# Patient Record
Sex: MALE | Race: WHITE | NOT HISPANIC OR LATINO | Employment: OTHER | URBAN - METROPOLITAN AREA
[De-identification: names, ages, dates, MRNs, and addresses within clinical notes are randomized per-mention and may not be internally consistent; named-entity substitution may affect disease eponyms.]

---

## 2017-02-13 ENCOUNTER — GENERIC CONVERSION - ENCOUNTER (OUTPATIENT)
Dept: OTHER | Facility: OTHER | Age: 67
End: 2017-02-13

## 2017-02-15 LAB — PROSTATE SPECIFIC ANTIGEN TOTAL (HISTORICAL): 1.4 NG/ML

## 2017-03-02 ENCOUNTER — ALLSCRIPTS OFFICE VISIT (OUTPATIENT)
Dept: OTHER | Facility: OTHER | Age: 67
End: 2017-03-02

## 2017-09-15 ENCOUNTER — ALLSCRIPTS OFFICE VISIT (OUTPATIENT)
Dept: OTHER | Facility: OTHER | Age: 67
End: 2017-09-15

## 2017-09-15 DIAGNOSIS — R73.09 OTHER ABNORMAL GLUCOSE: ICD-10-CM

## 2017-09-15 DIAGNOSIS — E78.5 HYPERLIPIDEMIA: ICD-10-CM

## 2017-09-15 DIAGNOSIS — Z12.5 ENCOUNTER FOR SCREENING FOR MALIGNANT NEOPLASM OF PROSTATE: ICD-10-CM

## 2017-09-15 DIAGNOSIS — Z00.00 ENCOUNTER FOR GENERAL ADULT MEDICAL EXAMINATION WITHOUT ABNORMAL FINDINGS: ICD-10-CM

## 2017-09-22 ENCOUNTER — LAB CONVERSION - ENCOUNTER (OUTPATIENT)
Dept: OTHER | Facility: OTHER | Age: 67
End: 2017-09-22

## 2017-09-22 LAB
A/G RATIO (HISTORICAL): 1.6 (CALC) (ref 1–2.5)
ALBUMIN SERPL BCP-MCNC: 4.3 G/DL (ref 3.6–5.1)
ALP SERPL-CCNC: 82 U/L (ref 40–115)
ALT SERPL W P-5'-P-CCNC: 14 U/L (ref 9–46)
AST SERPL W P-5'-P-CCNC: 19 U/L (ref 10–35)
BILIRUB SERPL-MCNC: 0.6 MG/DL (ref 0.2–1.2)
BUN SERPL-MCNC: 20 MG/DL (ref 7–25)
BUN/CREA RATIO (HISTORICAL): ABNORMAL (CALC) (ref 6–22)
CALCIUM SERPL-MCNC: 9.5 MG/DL (ref 8.6–10.3)
CHLORIDE SERPL-SCNC: 101 MMOL/L (ref 98–110)
CHOLEST SERPL-MCNC: 180 MG/DL
CHOLEST/HDLC SERPL: 2.5 (CALC)
CO2 SERPL-SCNC: 26 MMOL/L (ref 20–31)
CREAT SERPL-MCNC: 1.11 MG/DL (ref 0.7–1.25)
EGFR AFRICAN AMERICAN (HISTORICAL): 78 ML/MIN/1.73M2
EGFR-AMERICAN CALC (HISTORICAL): 67 ML/MIN/1.73M2
GAMMA GLOBULIN (HISTORICAL): 2.7 G/DL (CALC) (ref 1.9–3.7)
GLUCOSE (HISTORICAL): 101 MG/DL (ref 65–99)
HBA1C MFR BLD HPLC: 5.1 % OF TOTAL HGB
HDLC SERPL-MCNC: 73 MG/DL
HEPATITIS C ANTIBODY (HISTORICAL): NORMAL
LDL CHOLESTEROL (HISTORICAL): 86 MG/DL (CALC)
NON-HDL-CHOL (CHOL-HDL) (HISTORICAL): 107 MG/DL (CALC)
POTASSIUM SERPL-SCNC: 3.8 MMOL/L (ref 3.5–5.3)
SIGNAL TO CUT-OFF (HISTORICAL): 0.02
SODIUM SERPL-SCNC: 138 MMOL/L (ref 135–146)
TOTAL PROTEIN (HISTORICAL): 7 G/DL (ref 6.1–8.1)
TRIGL SERPL-MCNC: 111 MG/DL

## 2017-09-23 ENCOUNTER — GENERIC CONVERSION - ENCOUNTER (OUTPATIENT)
Dept: OTHER | Facility: OTHER | Age: 67
End: 2017-09-23

## 2017-10-12 ENCOUNTER — ALLSCRIPTS OFFICE VISIT (OUTPATIENT)
Dept: OTHER | Facility: OTHER | Age: 67
End: 2017-10-12

## 2017-10-26 ENCOUNTER — ALLSCRIPTS OFFICE VISIT (OUTPATIENT)
Dept: OTHER | Facility: OTHER | Age: 67
End: 2017-10-26

## 2017-11-09 ENCOUNTER — ALLSCRIPTS OFFICE VISIT (OUTPATIENT)
Dept: OTHER | Facility: OTHER | Age: 67
End: 2017-11-09

## 2017-11-24 ENCOUNTER — ALLSCRIPTS OFFICE VISIT (OUTPATIENT)
Dept: OTHER | Facility: OTHER | Age: 67
End: 2017-11-24

## 2017-12-08 ENCOUNTER — ALLSCRIPTS OFFICE VISIT (OUTPATIENT)
Dept: OTHER | Facility: OTHER | Age: 67
End: 2017-12-08

## 2018-01-09 NOTE — PROGRESS NOTES
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)   3  Bilateral carpal tunnel syndrome (354 0) (G56 03)   4  Cervical pain (723 1) (M54 2)   5  Cervical spinal stenosis (723 0) (M48 02)   6  Chronic low back pain (724 2,338 29) (M54 5,G89 29)   7  Colon cancer screening (V76 51) (Z12 11)   8  DDD (degenerative disc disease), lumbar (722 52) (M51 36)   9  DDD (degenerative disc disease), lumbosacral (722 52) (M51 37)   10  Degeneration of cervical disc without myelopathy (722 4) (M50 30)   11  Esophageal reflux (530 81) (K21 9)   12  Essential hypertension (401 9) (I10)   13  Hyperlipidemia (272 4) (E78 5)   14  Initial Medicare annual wellness visit (V70 0) (Z00 00)   15  Medicare annual wellness visit, subsequent (V70 0) (Z00 00)   16  Need for influenza vaccination (V04 81) (Z23)   17  Need for pneumococcal vaccination (V03 82) (Z23)   18  Need for vaccination with 13-polyvalent pneumococcal conjugate vaccine (V03 82) (Z23)   19  Prediabetes (790 29) (R73 03)   20  Prediabetes (790 29) (R73 09)   21  Seasonal allergies (477 9) (J30 2)   22  Special screening examination for neoplasm of prostate (V76 44) (Z12 5)    Current Meds   1  Centrum Silver 50+Men Oral Tablet; TAKE 1 TABLET DAILY; Therapy: (Recorded:56Zjt1444) to Recorded   2  Flonase Allergy Relief 50 MCG/ACT Nasal Suspension; INSTILL 2 SPRAYS AT BEDTIME   AS NEEDED; Therapy: 75Oqu9638 to (Evaluate:37Wlq9504)  Requested for: 76Rxc0402; Last   Rx:92Kbq3416 Ordered   3  Metoprolol Succinate ER 25 MG Oral Tablet Extended Release 24 Hour; Take 1 tablet   daily; Therapy: 62Ytu6812 to (Last Rx:68Vno4177)  Requested for: 88Ruq1911 Ordered   4  Metoprolol Succinate ER 50 MG Oral Tablet Extended Release 24 Hour;  Take 1 tablet   daily; Therapy: 04MXC7815 to (Evaluate:08Tom8410)  Requested for: 26Oct2017; Last   Rx:81Gic2352 Ordered   5  Omeprazole 20 MG Oral Capsule Delayed Release; TAKE 1 CAPSULE EVERY DAY; Therapy: 09PMN8802 to (Evaluate:10Mar2018)  Requested for: 57Ser8050; Last   Rx:03Xgh5117 Ordered   6  RaNITidine HCl - 300 MG Oral Tablet; Take 1 tablet daily as directed; Therapy: 98GFF7746 to (Wang Valdivia)  Requested for: 97Yrd2121; Last   Rx:35Vnj2716 Ordered   7  Simvastatin 20 MG Oral Tablet; TAKE 1 TABLET DAILY; Therapy: 75Sps6794 to (Evaluate:13Nov2017)  Requested for: 33ZXX9872; Last   Rx:49Xhw6151 Ordered   8  Vitamin C 500 MG Oral Capsule; TAKE 1 CAPSULE DAILY; Therapy: (Recorded:53Ipg7949) to Recorded    Allergies    1  No Known Drug Allergies    2  Seasonal    Future Appointments    Date/Time Provider Specialty Site   11/24/2017 11:00 AM MARIAN, Nurse Schedule  [facility]   09/17/2018 09:00 AM Gideon Mariscal DO Internal Medicine [facility]   03/09/2018 08:15 AM ZEENAT Dinero   Urology [facility]     Signatures   Electronically signed by : Ayah Huertas DO; Nov 12 2017 10:07PM EST                       (Author)

## 2018-01-11 NOTE — RESULT NOTES
Message   Notify the patient normal lipid level follow up as scheduled        Verified Results  (1) COMPREHENSIVE METABOLIC PANEL 28MHW0406 85:84WV Skip Sang     Test Name Result Flag Reference   GLUCOSE 97 mg/dL  65-99   Fasting reference interval   UREA NITROGEN (BUN) 22 mg/dL  7-25   CREATININE 1 03 mg/dL  0 70-1 25   For patients >52years of age, the reference limit  for Creatinine is approximately 13% higher for people  identified as -American  eGFR NON-AFR  AMERICAN 74 mL/min/1 73m2  > OR = 60   eGFR AFRICAN AMERICAN 86 mL/min/1 73m2  > OR = 60   BUN/CREATININE RATIO   0-34   NOT APPLICABLE (calc)   SODIUM 137 mmol/L  135-146   POTASSIUM 4 3 mmol/L  3 5-5 3   CHLORIDE 100 mmol/L     CARBON DIOXIDE 29 mmol/L  20-31   CALCIUM 9 6 mg/dL  8 6-10 3   PROTEIN, TOTAL 7 1 g/dL  6 1-8 1   ALBUMIN 4 6 g/dL  3 6-5 1   GLOBULIN 2 5 g/dL (calc)  1 9-3 7   ALBUMIN/GLOBULIN RATIO 1 8 (calc)  1 0-2 5   BILIRUBIN, TOTAL 0 7 mg/dL  0 2-1 2   ALKALINE PHOSPHATASE 76 U/L     AST 19 U/L  10-35   ALT 16 U/L  9-46     (1) LIPID PANEL, FASTING 18Ybu1540 09:16AM Skip Sang     Test Name Result Flag Reference   CHOLESTEROL, TOTAL 203 mg/dL H 125-200   HDL CHOLESTEROL 79 mg/dL  > OR = 40   TRIGLICERIDES 77 mg/dL  <603   LDL-CHOLESTEROL 109 mg/dL (calc)  <130   Desirable range <100 mg/dL for patients with CHD or  diabetes and <70 mg/dL for diabetic patients with  known heart disease  CHOL/HDLC RATIO 2 6 (calc)  < OR = 5 0   NON HDL CHOLESTEROL 124 mg/dL (calc)     Target for non-HDL cholesterol is 30 mg/dL higher than   LDL cholesterol target         Signatures   Electronically signed by : July Castillo DO; Sep 16 2016  4:55PM EST                       (Author)

## 2018-01-11 NOTE — RESULT NOTES
Message   notify the pt the Hepatitis C antibody is non-reactive f/u as scheduled        Verified Results  (1) COMPREHENSIVE METABOLIC PANEL 75HWI1359 29:27HZ Genius Pack     Test Name Result Flag Reference   GLUCOSE 101 mg/dL H 65-99   Fasting reference interval     For someone without known diabetes, a glucose value  between 100 and 125 mg/dL is consistent with  prediabetes and should be confirmed with a  follow-up test    UREA NITROGEN (BUN) 20 mg/dL  7-25   CREATININE 1 11 mg/dL  0 70-1 25   For patients >52years of age, the reference limit  for Creatinine is approximately 13% higher for people  identified as -American  eGFR NON-AFR  AMERICAN 67 mL/min/1 73m2  > OR = 60   eGFR AFRICAN AMERICAN 78 mL/min/1 73m2  > OR = 60   BUN/CREATININE RATIO   4-14   NOT APPLICABLE (calc)   SODIUM 138 mmol/L  135-146   POTASSIUM 3 8 mmol/L  3 5-5 3   CHLORIDE 101 mmol/L     CARBON DIOXIDE 26 mmol/L  20-31   CALCIUM 9 5 mg/dL  8 6-10 3   PROTEIN, TOTAL 7 0 g/dL  6 1-8 1   ALBUMIN 4 3 g/dL  3 6-5 1   GLOBULIN 2 7 g/dL (calc)  1 9-3 7   ALBUMIN/GLOBULIN RATIO 1 6 (calc)  1 0-2 5   BILIRUBIN, TOTAL 0 6 mg/dL  0 2-1 2   ALKALINE PHOSPHATASE 82 U/L     AST 19 U/L  10-35   ALT 14 U/L  9-46     (1) LIPID PANEL, FASTING 99Zqa5538 07:42AM [facility]     Test Name Result Flag Reference   CHOLESTEROL, TOTAL 180 mg/dL  <200   HDL CHOLESTEROL 73 mg/dL  >13   TRIGLICERIDES 599 mg/dL  <150   LDL-CHOLESTEROL 86 mg/dL (calc)     Reference range: <100     Desirable range <100 mg/dL for patients with CHD or  diabetes and <70 mg/dL for diabetic patients with  known heart disease  LDL-C is now calculated using the Hema-Young   calculation, which is a validated novel method providing   better accuracy than the Friedewald equation in the   estimation of LDL-C  Shiv Dinh  4250;173(45): 6035-8093   (http://Ynsect/faq/IPU304)   CHOL/HDLC RATIO 2 5 (calc)  <5 0   NON HDL CHOLESTEROL 107 mg/dL (calc)  <130   For patients with diabetes plus 1 major ASCVD risk   factor, treating to a non-HDL-C goal of <100 mg/dL   (LDL-C of <70 mg/dL) is considered a therapeutic   option       (Q) HEPATITIS C ANTIBODY 94Kex1478 07:42AM Dk Chicas     Test Name Result Flag Reference   HEPATITIS C ANTIBODY NON-REACTIVE  NON-REACTIVE   SIGNAL TO CUT-OFF 0 02  <1 00       Signatures   Electronically signed by : Yara Solorio DO; Sep 23 2017 10:32PM EST                       (Author)

## 2018-01-12 VITALS — HEART RATE: 64 BPM | DIASTOLIC BLOOD PRESSURE: 62 MMHG | SYSTOLIC BLOOD PRESSURE: 170 MMHG | WEIGHT: 170 LBS

## 2018-01-12 NOTE — RESULT NOTES
Message   Notify the patient normal comprehensive metabolic panel follow up as scheduled        Verified Results  (1) COMPREHENSIVE METABOLIC PANEL [id number] 82:93LL Madeline Paiz     Test Name Result Flag Reference   GLUCOSE 97 mg/dL  65-99   Fasting reference interval   UREA NITROGEN (BUN) 22 mg/dL  7-25   CREATININE 1 03 mg/dL  0 70-1 25   For patients >52years of age, the reference limit  for Creatinine is approximately 13% higher for people  identified as -American  eGFR NON-AFR   AMERICAN 74 mL/min/1 73m2  > OR = 60   eGFR AFRICAN AMERICAN 86 mL/min/1 73m2  > OR = 60   BUN/CREATININE RATIO   5-13   NOT APPLICABLE (calc)   SODIUM 137 mmol/L  135-146   POTASSIUM 4 3 mmol/L  3 5-5 3   CHLORIDE 100 mmol/L     CARBON DIOXIDE 29 mmol/L  20-31   CALCIUM 9 6 mg/dL  8 6-10 3   PROTEIN, TOTAL 7 1 g/dL  6 1-8 1   ALBUMIN 4 6 g/dL  3 6-5 1   GLOBULIN 2 5 g/dL (calc)  1 9-3 7   ALBUMIN/GLOBULIN RATIO 1 8 (calc)  1 0-2 5   BILIRUBIN, TOTAL 0 7 mg/dL  0 2-1 2   ALKALINE PHOSPHATASE 76 U/L     AST 19 U/L  10-35   ALT 16 U/L  9-46       Signatures   Electronically signed by : Trevor Pond DO; Sep 16 2016  4:54PM EST                       (Author)

## 2018-01-12 NOTE — RESULT NOTES
Message   Notify the patient normal hemoglobin A1c follow-up is scheduled        Verified Results  (1) COMPREHENSIVE METABOLIC PANEL 43GDM3535 29:61GR Josiah Beckford     Test Name Result Flag Reference   GLUCOSE 97 mg/dL  65-99   Fasting reference interval   UREA NITROGEN (BUN) 22 mg/dL  7-25   CREATININE 1 03 mg/dL  0 70-1 25   For patients >52years of age, the reference limit  for Creatinine is approximately 13% higher for people  identified as -American  eGFR NON-AFR  AMERICAN 74 mL/min/1 73m2  > OR = 60   eGFR AFRICAN AMERICAN 86 mL/min/1 73m2  > OR = 60   BUN/CREATININE RATIO   7-24   NOT APPLICABLE (calc)   SODIUM 137 mmol/L  135-146   POTASSIUM 4 3 mmol/L  3 5-5 3   CHLORIDE 100 mmol/L     CARBON DIOXIDE 29 mmol/L  20-31   CALCIUM 9 6 mg/dL  8 6-10 3   PROTEIN, TOTAL 7 1 g/dL  6 1-8 1   ALBUMIN 4 6 g/dL  3 6-5 1   GLOBULIN 2 5 g/dL (calc)  1 9-3 7   ALBUMIN/GLOBULIN RATIO 1 8 (calc)  1 0-2 5   BILIRUBIN, TOTAL 0 7 mg/dL  0 2-1 2   ALKALINE PHOSPHATASE 76 U/L     AST 19 U/L  10-35   ALT 16 U/L  9-46     (1) LIPID PANEL, FASTING 60Wzn3050 09:16AM Josiah Beckford     Test Name Result Flag Reference   CHOLESTEROL, TOTAL 203 mg/dL H 125-200   HDL CHOLESTEROL 79 mg/dL  > OR = 40   TRIGLICERIDES 77 mg/dL  <345   LDL-CHOLESTEROL 109 mg/dL (calc)  <130   Desirable range <100 mg/dL for patients with CHD or  diabetes and <70 mg/dL for diabetic patients with  known heart disease  CHOL/HDLC RATIO 2 6 (calc)  < OR = 5 0   NON HDL CHOLESTEROL 124 mg/dL (calc)     Target for non-HDL cholesterol is 30 mg/dL higher than   LDL cholesterol target  (Q) HEMOGLOBIN A1c 81Vhc1972 09:16AM Josiah Beckford   REPORT COMMENT:  FASTING:YES     Test Name Result Flag Reference   HEMOGLOBIN A1c 5 5 % of total Hgb  <5 7   According to ADA guidelines, hemoglobin A1c <7 0%  represents optimal control in non-pregnant diabetic  patients  Different metrics may apply to specific  patient populations  Standards of Medical Care in    Diabetes Care  2013;36:s11-s66     For the purpose of screening for the presence of  diabetes  <5 7%       Consistent with the absence of diabetes  5 7-6 4%    Consistent with increased risk for diabetes              (prediabetes)  >or=6 5%    Consistent with diabetes     This assay result is consistent with a decreased risk  of diabetes  Currently, no consensus exists for use of hemoglobin  A1c for diagnosis of diabetes for children         Signatures   Electronically signed by : Lowell Nicholson DO; Sep 16 2016  4:55PM EST                       (Author)

## 2018-01-13 NOTE — PROGRESS NOTES
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)   3  Bilateral carpal tunnel syndrome (354 0) (G56 03)   4  Cervical pain (723 1) (M54 2)   5  Cervical spinal stenosis (723 0) (M48 02)   6  Chronic low back pain (724 2,338 29) (M54 5,G89 29)   7  Colon cancer screening (V76 51) (Z12 11)   8  DDD (degenerative disc disease), lumbar (722 52) (M51 36)   9  DDD (degenerative disc disease), lumbosacral (722 52) (M51 37)   10  Degeneration of cervical disc without myelopathy (722 4) (M50 30)   11  Esophageal reflux (530 81) (K21 9)   12  Essential hypertension (401 9) (I10)   13  Hyperlipidemia (272 4) (E78 5)   14  Initial Medicare annual wellness visit (V70 0) (Z00 00)   15  Medicare annual wellness visit, subsequent (V70 0) (Z00 00)   16  Need for influenza vaccination (V04 81) (Z23)   17  Need for pneumococcal vaccination (V03 82) (Z23)   18  Need for vaccination with 13-polyvalent pneumococcal conjugate vaccine (V03 82) (Z23)   19  Prediabetes (790 29) (R73 03)   20  Prediabetes (790 29) (R73 09)   21  Seasonal allergies (477 9) (J30 2)   22  Special screening examination for neoplasm of prostate (V76 44) (Z12 5)    Current Meds   1  Centrum Silver 50+Men Oral Tablet; TAKE 1 TABLET DAILY; Therapy: (Recorded:56Iya0648) to Recorded   2  Flonase Allergy Relief 50 MCG/ACT Nasal Suspension; INSTILL 2 SPRAYS AT BEDTIME   AS NEEDED; Therapy: 11Xic2301 to (Evaluate:15Emo1652)  Requested for: 26Sgc5775; Last   Rx:26Pey7096 Ordered   3  Metoprolol Succinate ER 25 MG Oral Tablet Extended Release 24 Hour;  Take 1 tablet daily; Therapy: 89Inf3466 to (Last Rx:29Yzu0175)  Requested for: 81Dzl3983 Ordered   4  Omeprazole 20 MG Oral Capsule Delayed Release; TAKE 1 CAPSULE EVERY DAY; Therapy: 61ITB3586 to (Evaluate:10Mar2018)  Requested for: 19Leb5372; Last   Rx:17Tld8194 Ordered   5  RaNITidine HCl - 300 MG Oral Tablet; Take 1 tablet daily as directed; Therapy: 35UGO2222 to (Sligo Herminia)  Requested for: 30Dym9460; Last   Rx:40Gnl1313 Ordered   6  Simvastatin 20 MG Oral Tablet; TAKE 1 TABLET DAILY; Therapy: 22Ucy9532 to (Evaluate:13Nov2017)  Requested for: 23DLK7916; Last   Rx:80Zxy1578 Ordered   7  Vitamin C 500 MG Oral Capsule; TAKE 1 CAPSULE DAILY; Therapy: (Recorded:64Zzr3801) to Recorded    Allergies    1  No Known Drug Allergies    2  Seasonal    Vitals  Signs    Heart Rate: 84  Systolic: 146, LUE, Sitting  Diastolic: 88, LUE, Sitting    Future Appointments    Date/Time Provider Specialty Site   10/26/2017 01:00 PM MAB, [facility] Schedule  [facility]   09/17/2018 09:00 AM Hodges Nageotte, DO Internal Medicine [facility]   03/09/2018 08:15 AM ZEENAT Cuba  Urology [facility]     Signatures   Electronically signed by :  Abimael Abbott, ; Oct 18 2017  6:35PM EST                       (Author)

## 2018-01-14 VITALS — DIASTOLIC BLOOD PRESSURE: 88 MMHG | SYSTOLIC BLOOD PRESSURE: 198 MMHG | HEART RATE: 84 BPM

## 2018-01-15 NOTE — RESULT NOTES
Message   Notify the patient normal comprehensive metabolic panel except for elevation of the blood sugar in the prediabetic range; please have the patient reduce carbohydrates and sweets and follow up as scheduled        Verified Results  (1) COMPREHENSIVE METABOLIC PANEL [id number] 83:41DB Lazaro Mercado     Test Name Result Flag Reference   GLUCOSE 101 mg/dL H 65-99   Fasting reference interval     For someone without known diabetes, a glucose value  between 100 and 125 mg/dL is consistent with  prediabetes and should be confirmed with a  follow-up test    UREA NITROGEN (BUN) 20 mg/dL  7-25   CREATININE 1 11 mg/dL  0 70-1 25   For patients >52years of age, the reference limit  for Creatinine is approximately 13% higher for people  identified as -American  eGFR NON-AFR   AMERICAN 67 mL/min/1 73m2  > OR = 60   eGFR AFRICAN AMERICAN 78 mL/min/1 73m2  > OR = 60   BUN/CREATININE RATIO   8-01   NOT APPLICABLE (calc)   SODIUM 138 mmol/L  135-146   POTASSIUM 3 8 mmol/L  3 5-5 3   CHLORIDE 101 mmol/L     CARBON DIOXIDE 26 mmol/L  20-31   CALCIUM 9 5 mg/dL  8 6-10 3   PROTEIN, TOTAL 7 0 g/dL  6 1-8 1   ALBUMIN 4 3 g/dL  3 6-5 1   GLOBULIN 2 7 g/dL (calc)  1 9-3 7   ALBUMIN/GLOBULIN RATIO 1 6 (calc)  1 0-2 5   BILIRUBIN, TOTAL 0 6 mg/dL  0 2-1 2   ALKALINE PHOSPHATASE 82 U/L     AST 19 U/L  10-35   ALT 14 U/L  9-46       Signatures   Electronically signed by : Renard Luu DO; Sep 23 2017 10:30PM EST                       (Author)

## 2018-01-15 NOTE — RESULT NOTES
Message   Notify the patient normal lipid level follow up as scheduled        Verified Results  (1) COMPREHENSIVE METABOLIC PANEL [id number] 09:19OE Shopliment     Test Name Result Flag Reference   GLUCOSE 101 mg/dL H 65-99   Fasting reference interval     For someone without known diabetes, a glucose value  between 100 and 125 mg/dL is consistent with  prediabetes and should be confirmed with a  follow-up test    UREA NITROGEN (BUN) 20 mg/dL  7-25   CREATININE 1 11 mg/dL  0 70-1 25   For patients >52years of age, the reference limit  for Creatinine is approximately 13% higher for people  identified as -American  eGFR NON-AFR  AMERICAN 67 mL/min/1 73m2  > OR = 60   eGFR AFRICAN AMERICAN 78 mL/min/1 73m2  > OR = 60   BUN/CREATININE RATIO   0-61   NOT APPLICABLE (calc)   SODIUM 138 mmol/L  135-146   POTASSIUM 3 8 mmol/L  3 5-5 3   CHLORIDE 101 mmol/L     CARBON DIOXIDE 26 mmol/L  20-31   CALCIUM 9 5 mg/dL  8 6-10 3   PROTEIN, TOTAL 7 0 g/dL  6 1-8 1   ALBUMIN 4 3 g/dL  3 6-5 1   GLOBULIN 2 7 g/dL (calc)  1 9-3 7   ALBUMIN/GLOBULIN RATIO 1 6 (calc)  1 0-2 5   BILIRUBIN, TOTAL 0 6 mg/dL  0 2-1 2   ALKALINE PHOSPHATASE 82 U/L     AST 19 U/L  10-35   ALT 14 U/L  9-46     (1) LIPID PANEL, FASTING 78Pms3485 07:42AM [facility]     Test Name Result Flag Reference   CHOLESTEROL, TOTAL 180 mg/dL  <200   HDL CHOLESTEROL 73 mg/dL  >57   TRIGLICERIDES 254 mg/dL  <150   LDL-CHOLESTEROL 86 mg/dL (calc)     Reference range: <100     Desirable range <100 mg/dL for patients with CHD or  diabetes and <70 mg/dL for diabetic patients with  known heart disease  LDL-C is now calculated using the Hema-Young   calculation, which is a validated novel method providing   better accuracy than the Friedewald equation in the   estimation of LDL-C  Zach Guerrero al  Vicie Fails  8381;256(38): 5632-0406   (http://HashTip/faq/NZQ086)   CHOL/HDLC RATIO 2 5 (calc)  <5 0   NON HDL CHOLESTEROL 107 mg/dL (calc)  <130   For patients with diabetes plus 1 major ASCVD risk   factor, treating to a non-HDL-C goal of <100 mg/dL   (LDL-C of <70 mg/dL) is considered a therapeutic   option         Signatures   Electronically signed by : Quinn Warren DO; Sep 23 2017 10:31PM EST                       (Author)

## 2018-01-15 NOTE — PROGRESS NOTES
Assessment    1  Medicare annual wellness visit, subsequent (V70 0) (Z00 00)   2  Hyperlipidemia (272 4) (E78 5)   3  Prediabetes (790 29) (R73 09)    Assessment and plan #1 annual Medicare wellness examination completed for the patient overall the patient is clinically stable and doing well we encouraged the patient fall healthy and balanced diet, encourage the pt to exercize  routinely  I'll be ordering the patient's PSA and hemoglobin A1c  #2 hyperlipidemia recommend a low-cholesterol diet #3 prediabetes reduce carbohydrates and sweets and will monitor the hemoglobin A1c  The pt did receive the flu vaccine  Plan   Need for influenza vaccination    · Fluzone High-Dose 0 5 ML Intramuscular Suspension Prefilled Syringe  Prediabetes    · (1) HEMOGLOBIN A1C; Status:Active; Requested for:75Hfr0012;     (1) COMPREHENSIVE METABOLIC PANEL; Status:Resulted - Requires Verification;   Done: 78OPO4415 12:00AM  Due:11Ktj6284;Ordered; For:Prediabetes; Ordered By:Ronaldo Flores;   (1) LIPID PANEL, FASTING; Status:Resulted - Requires Verification;   Done: 28EJD3145 12:00AM  Due:58Mtb2247;Ordered;    For:Essential hypertension, Prediabetes; Ordered By:Gregor Flores Smoker;      History of Present Illness  Welcome to [employer] and Wellness Visits: The patient is being seen for the subsequent annual wellness visit  Medicare Screening and Risk Factors   Hospitalizations: no previous hospitalizations  Medicare Screening Tests Risk Questions   Drug and Alcohol Use: The patient has never smoked cigarettes  The patient reports occasional alcohol use and drinking 3-4 drinks per week  Alcohol concern:   The patient has no concerns about alcohol abuse  He has never used illicit drugs  Diet and Physical Activity: Current diet includes well balanced meals, low salt food choices, 1 cups of coffee per day, 0 cans of regular soda per day and 36  He exercises 5 times per week   Exercise: walking, strength training, daily 15min 1:15 (4 5miles ) minutes per day  Mood Disorder and Cognitive Impairment Screening: He denies feeling down, depressed, or hopeless over the past two weeks  He denies feeling little interest or pleasure in doing things over the past two weeks  Cognitive impairment screening: denies difficulty handling complex tasks, denies difficulty with reasoning, denies difficulty with language and denies difficulty with behavior  Functional Ability/Level of Safety: Hearing is normal bilaterally and a hearing aid is not used  Activities of daily living details: does not need help using the phone, no transportation help needed, does not need help shopping, no meal preparation help needed, does not need help doing housework, does not need help doing laundry, does not need help managing medications and does not need help managing money  Fall risk factors: The patient fell 0 times in the past 12 months  Home safety risk factors:  no loose rugs, no poor household lighting, no uneven floors, no household clutter, grab bars in the bathroom and handrails on the stairs  Advance Directives: Advance directives: living will, durable power of  for health care directives and advance directives  Co-Managers and Medical Equipment/Suppliers: See Patient Care Team      Patient Care Team    Care Team Member Role Specialty Office Number   Negro Cano DO Specialist Orthopedic Surgery [phone]  [place], 1970 [facility]  Internal Medicine [phone]   Maryanne PERES  Urology [phone]     Review of Systems    Cardiovascular: negative  Gastrointestinal: negative  Genitourinary: negative  Active Problems    1  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)   2  Bilateral carpal tunnel syndrome (354 0) (G56 01,G56 02)   3  Cervical pain (723 1) (M54 2)   4  Cervical spinal stenosis (723 0) (M48 02)   5  Chronic low back pain (724 2,338 29) (M54 5,G89 29)   6  DDD (degenerative disc disease), lumbar (722 52) (M51 36)   7  DDD (degenerative disc disease), lumbosacral (722 52) (M51 37)   8  Degeneration of cervical disc without myelopathy (722 4) (M50 30)   9  Esophageal reflux (530 81) (K21 9)   10  Essential hypertension (401 9) (I10)   11  Hyperlipidemia (272 4) (E78 5)   12  Initial Medicare annual wellness visit (V70 0) (Z00 00)   13  Need for pneumococcal vaccination (V03 82) (Z23)   14  Need for vaccination with 13-polyvalent pneumococcal conjugate vaccine (V03 82) (Z23)   15  Prediabetes (790 29) (R73 09)   16  Seasonal allergies (477 9) (J30 2)   17  Special screening examination for neoplasm of prostate (V76 44) (Z12 5)    Past Medical History    · Need for pneumococcal vaccination (V03 82) (Z23)    The active problems and past medical history were reviewed and updated today  Surgical History    · History of Back Surgery   · History of Hand Incision Tendon Sheath Of A Finger   · History of Sinus Surgery   · History of Tonsillectomy With Adenoidectomy    The surgical history was reviewed and updated today  Family History  Mother    · Family history of Heart Disease (V17 49)  Father    · Family history of Colon Cancer (V16 0)  Family History    · Family history of Family Health Status Of Father -    · Family history of Family Health Status Of Mother -     The family history was reviewed and updated today  Social History    · Being A Social Drinker   · Denied: History of Drug Use   · Former smoker (F42 64) (A53 284)  The social history was reviewed and updated today  The social history was reviewed and is unchanged  Current Meds   1  Bystolic 5 MG Oral Tablet; take 1 tablet every day; Therapy: 31PKS0271 to ([facility])  Requested for: 05Acf3276; Last   Rx:41Xah4150 Ordered   2  Centrum Silver TABS; TAKE 1 TABLET DAILY; Therapy: (Recorded:16Brr9998) to Recorded   3  Flonase Allergy Relief 50 MCG/ACT Nasal Suspension; INSTILL 2 SPRAYS AT BEDTIME   AS NEEDED;    Therapy: 83Zzl1036 to (Evaluate:10Hwm4328)  Requested for: 10Kgy6482; Last   Rx:68Hbn5290 Ordered   4  Omeprazole 20 MG Oral Capsule Delayed Release; TAKE 1 CAPSULE EVERY DAY; Therapy: 29SKE8179 to (Evaluate:76Nkb3421)  Requested for: 83Htn1837; Last   Rx:71Nly4878 Ordered   5  Ranitidine HCl - 300 MG Oral Tablet; Take 1 tablet daily as directed; Therapy: 28XQT2222 to (Evaluate:10Oct2016)  Requested for: 63Cpo1884; Last   Rx:11Kma8380 Ordered   6  Simvastatin 20 MG Oral Tablet; TAKE 1 TABLET DAILY; Therapy: 55Nqn7774 to (Danny Dela Cruz)  Requested for: 78ZKY5453; Last   Rx:99Dmq2209 Ordered   7  Vitamin C 500 MG Oral Capsule; TAKE 1 CAPSULE DAILY; Therapy: (Recorded:95Ljd2245) to Recorded    The medication list was reviewed and updated today  Allergies    1  No Known Drug Allergies    2  Seasonal    Immunizations   1    Influenza  09-Oct-2013    PCV  24-Aug-2015    PPSV  09-Dec-2013    Tdap  24-Feb-2011    Zoster  06-Jan-2014     Physical Exam    Constitutional   General appearance: No acute distress, well appearing and well nourished  Eyes   Conjunctiva and lids: No erythema, swelling or discharge  Pupils and irises: Equal, round, reactive to light  Ears, Nose, Mouth, and Throat   External inspection of ears and nose: Normal     Otoscopic examination: Tympanic membranes translucent with normal light reflex  Canals patent without erythema  Hearing: Normal     Nasal mucosa, septum, and turbinates: Normal without edema or erythema  Lips, teeth, and gums: Normal, good dentition  Oropharynx: Normal with no erythema, edema, exudate or lesions  Neck   Neck: Supple, symmetric, trachea midline, no masses  Pulmonary   Respiratory effort: No increased work of breathing or signs of respiratory distress  Auscultation of lungs: Clear to auscultation  Cardiovascular   Auscultation of heart: Normal rate and rhythm, normal S1 and S2, no murmurs  Abdominal aorta: Normal     Pedal pulses: 2+ bilaterally  Examination of extremities for edema and/or varicosities: Normal     Abdomen   Abdomen: Non-tender, no masses  Liver and spleen: No hepatomegaly or splenomegaly  Musculoskeletal   Inspection/palpation of digits and nails: Normal without clubbing or cyanosis  Muscle strength/tone: Normal     Skin   Skin and subcutaneous tissue: Normal without rashes or lesions  Palpation of skin and subcutaneous tissue: Normal turgor  Neurologic   Cranial nerves: Cranial nerves 2-12 intact  Psychiatric   Mood and affect: Normal        Future Appointments    Date/Time Provider Specialty Site   09/15/2017 10:00 AM Margarita Brenner DO Internal Medicine [facility]   02/15/2017 01:30 PM ZEENAT Santiago   Urology [facility]     Signatures   Electronically signed by : Ardia Canavan, DO; Sep 18 2016 11:34AM EST                       (Author)

## 2018-01-16 NOTE — PROGRESS NOTES
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)   3  Bilateral carpal tunnel syndrome (354 0) (G56 03)   4  Cervical pain (723 1) (M54 2)   5  Cervical spinal stenosis (723 0) (M48 02)   6  Chronic low back pain (724 2,338 29) (M54 5,G89 29)   7  Colon cancer screening (V76 51) (Z12 11)   8  DDD (degenerative disc disease), lumbar (722 52) (M51 36)   9  DDD (degenerative disc disease), lumbosacral (722 52) (M51 37)   10  Degeneration of cervical disc without myelopathy (722 4) (M50 30)   11  Esophageal reflux (530 81) (K21 9)   12  Essential hypertension (401 9) (I10)   13  Hyperlipidemia (272 4) (E78 5)   14  Initial Medicare annual wellness visit (V70 0) (Z00 00)   15  Medicare annual wellness visit, subsequent (V70 0) (Z00 00)   16  Need for influenza vaccination (V04 81) (Z23)   17  Need for pneumococcal vaccination (V03 82) (Z23)   18  Need for vaccination with 13-polyvalent pneumococcal conjugate vaccine (V03 82) (Z23)   19  Prediabetes (790 29) (R73 03)   20  Prediabetes (790 29) (R73 09)   21  Seasonal allergies (477 9) (J30 2)   22  Special screening examination for neoplasm of prostate (V76 44) (Z12 5)    Current Meds   1  Centrum Silver 50+Men Oral Tablet; TAKE 1 TABLET DAILY; Therapy: (Recorded:65Pyz1985) to Recorded   2  Flonase Allergy Relief 50 MCG/ACT Nasal Suspension; INSTILL 2 SPRAYS AT BEDTIME   AS NEEDED; Therapy: 92Sbp8052 to (Evaluate:29Srw5195)  Requested for: 44Wau0715; Last   Rx:56Dwu4823 Ordered   3  Metoprolol Succinate ER 25 MG Oral Tablet Extended Release 24 Hour; Take 1 tablet   daily;    Therapy: 10Tcl3560 to (Last Rx:16Uqe3366)  Requested for: 67Hwe7636 Ordered   4  Omeprazole 20 MG Oral Capsule Delayed Release; TAKE 1 CAPSULE EVERY DAY; Therapy: 34RNJ6131 to (Evaluate:10Mar2018)  Requested for: 13Lne7932; Last   Rx:92Kci8514 Ordered   5  RaNITidine HCl - 300 MG Oral Tablet; Take 1 tablet daily as directed; Therapy: 40AIE7366 to (Rose Villareal)  Requested for: 49Wcd9893; Last   Rx:58Wza5173 Ordered   6  Simvastatin 20 MG Oral Tablet; TAKE 1 TABLET DAILY; Therapy: 85Yiy8969 to (Evaluate:13Nov2017)  Requested for: 71KZU6050; Last   Rx:79Gqk6627 Ordered   7  Vitamin C 500 MG Oral Capsule; TAKE 1 CAPSULE DAILY; Therapy: (Recorded:42Jpu2045) to Recorded    Allergies    1  No Known Drug Allergies    2  Seasonal    Vitals  Signs    Heart Rate: 70  Systolic: 392, LUE, Sitting  Diastolic: 90, LUE, Sitting    Plan  Essential hypertension    · Metoprolol Succinate ER 50 MG Oral Tablet Extended Release 24 Hour; Take 1  tablet daily    Future Appointments    Date/Time Provider Specialty Site   11/09/2017 12:00 PM MAB, Nurse Schedule  [facility]   09/17/2018 09:00 AM Tayler Garcia DO Internal Medicine [facility]   03/09/2018 08:15 AM ZEENAT Caballero   Urology [facility]     Signatures   Electronically signed by : Danay Jim DO; Oct 26 2017  7:55PM EST                       (Author)

## 2018-01-16 NOTE — PROGRESS NOTES
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)   3  Bilateral carpal tunnel syndrome (354 0) (G56 03)   4  Cervical pain (723 1) (M54 2)   5  Cervical spinal stenosis (723 0) (M48 02)   6  Chronic low back pain (724 2,338 29) (M54 5,G89 29)   7  Colon cancer screening (V76 51) (Z12 11)   8  DDD (degenerative disc disease), lumbar (722 52) (M51 36)   9  DDD (degenerative disc disease), lumbosacral (722 52) (M51 37)   10  Degeneration of cervical disc without myelopathy (722 4) (M50 30)   11  Esophageal reflux (530 81) (K21 9)   12  Essential hypertension (401 9) (I10)   13  Hyperlipidemia (272 4) (E78 5)   14  Initial Medicare annual wellness visit (V70 0) (Z00 00)   15  Medicare annual wellness visit, subsequent (V70 0) (Z00 00)   16  Need for influenza vaccination (V04 81) (Z23)   17  Need for pneumococcal vaccination (V03 82) (Z23)   18  Need for vaccination with 13-polyvalent pneumococcal conjugate vaccine (V03 82) (Z23)   19  Prediabetes (790 29) (R73 03)   20  Prediabetes (790 29) (R73 09)   21  Seasonal allergies (477 9) (J30 2)   22  Special screening examination for neoplasm of prostate (V76 44) (Z12 5)    Current Meds   1  AmLODIPine Besylate 5 MG Oral Tablet; Take 1 tablet daily; Therapy: 46LOR7989 to (Evaluate:08Jan2018)  Requested for: 65ZCB7876; Last   Rx:09Nov2017 Ordered   2  Centrum Silver 50+Men Oral Tablet; TAKE 1 TABLET DAILY; Therapy: (Recorded:94Zdf0447) to Recorded   3  Flonase Allergy Relief 50 MCG/ACT Nasal Suspension; INSTILL 2 SPRAYS AT BEDTIME   AS NEEDED; Therapy: 20Prn0198 to (Evaluate:95Uro7894)  Requested for: 51Ktg8318; Last   Rx:44Pcn9936 Ordered   4  Metoprolol Succinate ER 25 MG Oral Tablet Extended Release 24 Hour; Take 1 tablet   daily;    Therapy: 43Xor3831 to  Requested for: 30HRP8763 Recorded 5  Metoprolol Succinate ER 50 MG Oral Tablet Extended Release 24 Hour; Take 1 tablet   daily; Therapy: 76JZP6401 to (Evaluate:35Rvp6609)  Requested for: 26Oct2017; Last   Rx:26Oct2017 Ordered   6  Omeprazole 20 MG Oral Capsule Delayed Release; TAKE 1 CAPSULE EVERY DAY; Therapy: 08NGC4826 to (Evaluate:10Mar2018)  Requested for: 40Xcj9187; Last   Rx:90Jrk1578 Ordered   7  RaNITidine HCl - 300 MG Oral Tablet; Take 1 tablet daily as directed; Therapy: 72HEY0325 to ([facility]er Line)  Requested for: 61Znw7103; Last   Rx:41Xie1133 Ordered   8  Simvastatin 20 MG Oral Tablet; TAKE 1 TABLET DAILY; Therapy: 01Yyr4353 to (Evaluate:13Nov2017)  Requested for: 04OHT7848; Last   Rx:01Ull0256 Ordered   9  Vitamin C 500 MG Oral Capsule; TAKE 1 CAPSULE DAILY; Therapy: (Recorded:24Vwp6929) to Recorded    Allergies    1  No Known Drug Allergies    2  Seasonal    Vitals  Signs    Systolic: 943  Diastolic: 72    Future Appointments    Date/Time Provider Specialty Site   12/08/2017 11:00 AM MARIAN, Nurse Schedule  [facility] [address] [place] [address]   09/17/2018 09:00 AM Danny Mcgregor DO Internal Medicine [facility] [address] [place] [address]   03/09/2018 08:15 AM ZEENAT Crook   Urology [facility] [address] [place] [address]     Signatures   Electronically signed by : Kodi Ospina DO; Nov 27 2017  9:36PM EST                       (Author)

## 2018-01-17 ENCOUNTER — ALLSCRIPTS OFFICE VISIT (OUTPATIENT)
Dept: OTHER | Facility: OTHER | Age: 68
End: 2018-01-17

## 2018-01-18 NOTE — RESULT NOTES
Message   notify the pt normal HgA1c f/u as scheduled        Verified Results  (1) COMPREHENSIVE METABOLIC PANEL 50VKC9889 97:82UF e994lyne Senior     Test Name Result Flag Reference   GLUCOSE 101 mg/dL H 65-99   Fasting reference interval     For someone without known diabetes, a glucose value  between 100 and 125 mg/dL is consistent with  prediabetes and should be confirmed with a  follow-up test    UREA NITROGEN (BUN) 20 mg/dL  7-25   CREATININE 1 11 mg/dL  0 70-1 25   For patients >52years of age, the reference limit  for Creatinine is approximately 13% higher for people  identified as -American  eGFR NON-AFR  AMERICAN 67 mL/min/1 73m2  > OR = 60   eGFR AFRICAN AMERICAN 78 mL/min/1 73m2  > OR = 60   BUN/CREATININE RATIO   7-74   NOT APPLICABLE (calc)   SODIUM 138 mmol/L  135-146   POTASSIUM 3 8 mmol/L  3 5-5 3   CHLORIDE 101 mmol/L     CARBON DIOXIDE 26 mmol/L  20-31   CALCIUM 9 5 mg/dL  8 6-10 3   PROTEIN, TOTAL 7 0 g/dL  6 1-8 1   ALBUMIN 4 3 g/dL  3 6-5 1   GLOBULIN 2 7 g/dL (calc)  1 9-3 7   ALBUMIN/GLOBULIN RATIO 1 6 (calc)  1 0-2 5   BILIRUBIN, TOTAL 0 6 mg/dL  0 2-1 2   ALKALINE PHOSPHATASE 82 U/L     AST 19 U/L  10-35   ALT 14 U/L  9-46     (1) LIPID PANEL, FASTING 62Nxy4896 07:42AM ADAPTIXne Senior     Test Name Result Flag Reference   CHOLESTEROL, TOTAL 180 mg/dL  <200   HDL CHOLESTEROL 73 mg/dL  >86   TRIGLICERIDES 276 mg/dL  <150   LDL-CHOLESTEROL 86 mg/dL (calc)     Reference range: <100     Desirable range <100 mg/dL for patients with CHD or  diabetes and <70 mg/dL for diabetic patients with  known heart disease  LDL-C is now calculated using the Hema-Young   calculation, which is a validated novel method providing   better accuracy than the Friedewald equation in the   estimation of LDL-C  Kwabena Posey Beat  1949;988(18): 7827-7009   (http://[employer]/faq/CKL991)   CHOL/HDLC RATIO 2 5 (calc)  <5 0   NON HDL CHOLESTEROL 107 mg/dL (calc)  <130   For patients with diabetes plus 1 major ASCVD risk   factor, treating to a non-HDL-C goal of <100 mg/dL   (LDL-C of <70 mg/dL) is considered a therapeutic   option  (Q) HEPATITIS C ANTIBODY 63Glk9615 07:42AM Melody Hernandes     Test Name Result Flag Reference   HEPATITIS C ANTIBODY NON-REACTIVE  NON-REACTIVE   SIGNAL TO CUT-OFF 0 02  <1 00     (Q) HEMOGLOBIN A1c 01Vcr9594 07:42AM Melody Hernandes   REPORT COMMENT:  FASTING:YES     Test Name Result Flag Reference   HEMOGLOBIN A1c 5 1 % of total Hgb  <5 7   For the purpose of screening for the presence of  diabetes:     <5 7%       Consistent with the absence of diabetes  5 7-6 4%    Consistent with increased risk for diabetes              (prediabetes)  > or =6 5%  Consistent with diabetes     This assay result is consistent with a decreased risk  of diabetes  Currently, no consensus exists regarding use of  hemoglobin A1c for diagnosis of diabetes in children  According to American Diabetes Association (ADA)  guidelines, hemoglobin A1c <7 0% represents optimal  control in non-pregnant diabetic patients  Different  metrics may apply to specific patient populations  Standards of Medical Care in Diabetes(ADA)         Signatures   Electronically signed by : Brandon Camarena DO; Sep 23 2017 10:33PM EST                       (Author)

## 2018-01-18 NOTE — PROGRESS NOTES
Assessment    1  Encounter for preventive health examination (V70 0) (Z00 00)    Assessment and plan #1 subsequent Medicare wellness examination completed for patient recommend a healthy balanced diet, currently the patient is clinically stable and doing well, recommend routine exercise will check a comprehensive metabolic panel, hemoglobin A1c and lipid panel  #2 hypertension the patient reports to me that because of insurance reasons he is interested in changing the bistolic to metoprolol ER 25 mg once a day ; He is to  monitor his blood pressure was a keeping log  for blood pressure and pulse check in 2-4 weeks if he notices a bp greater than 160/90-notify me immediately RTO in one year, bp check 2-4 weeks call if any problems     Plan   Health Maintenance    · Metoprolol Succinate ER 25 MG Oral Tablet Extended Release 24 Hour; Take 1  tablet daily   · (1) COMPREHENSIVE METABOLIC PANEL; Status:Active; Requested for:37Xuk7328; Health Maintenance, Hyperlipidemia    · (1) LIPID PANEL, FASTING; Status:Active; Requested for:73Irq9044; Health Maintenance, Prediabetes    · (1) HEMOGLOBIN A1C; Status:Active; Requested for:89Gbz4109;     (1) PSA (SCREEN) (Dx V76 44 Screen for Prostate Cancer); Status:Active; Requested for:22Law1650;   Perform:[facility]; Due:05Cnv6899; Ordered;    For:Health Maintenance, Special screening examination for neoplasm of prostate; Ordered By:Donna Flores;      Chief Complaint  Patient is here for a Medicare wellness exam       History of Present Illness  HPI: 150/70 at home today avg 140   Welcome to [employer] and Wellness Visits: The patient is being seen for the subsequent annual wellness visit  Medicare Screening and Risk Factors   Hospitalizations: no previous hospitalizations  Once per lifetime medicare screening tests: AAA screening [place] (2013)  Medicare Screening Tests Risk Questions   Osteoporosis risk assessment: none indicated     HIV risk assessment: none indicated  Drug and Alcohol Use: The patient is a former cigarette smoker  He quit [employer] y[employer]  The patient reports occasional alcohol use and drinking 5 drinks per week  He has never used illicit drugs  Diet and Physical Activity: Current diet includes well balanced meals, 2 servings of fruit per day, 2 servings of vegetables per day, 1 servings of simple carbohydrates per day, 1 servings of dairy products per day and 1 cups of coffee per day  He exercises daily and exercises 5 times per week  Exercise: walking, strength training 90 minutes per day  Mood Disorder and Cognitive Impairment Screening:   Depression screening was done using  He denies feeling down, depressed, or hopeless over the past two weeks  He denies feeling little interest or pleasure in doing things over the past two weeks  Cognitive impairment screening: denies difficulty learning/retaining new information, denies difficulty handling complex tasks, denies difficulty with reasoning, denies difficulty with spatial ability and orientation, denies difficulty with language and denies difficulty with behavior  Functional Ability/Level of Safety: Hearing is normal bilaterally  He does not use a hearing aid  Activities of daily living details: does not need help using the phone, no transportation help needed, does not need help shopping, no meal preparation help needed, does not need help doing housework, does not need help doing laundry, does not need help managing medications and does not need help managing money  Fall risk factors: The patient fell 0 times in the past 12 months  Home safety risk factors:  no unfamiliar surroundings, no loose rugs, no poor household lighting, no uneven floors, no household clutter, grab bars in the bathroom and handrails on the stairs  Advance Directives: Advance directives: living will, durable power of  for health care directives, advance directives and poa wife     Co-Managers and Medical Equipment/Suppliers: See Patient Care Team      Patient Care Team    Care Team Member Role Specialty Office Number   Di Billing DO Specialist Orthopedic Surgery [phone], 1970 [facility]  Internal Medicine [phone]   Kj PERES  Specialist Urology [phone]     Active Problems    1  Advance directive on file (V49 89) (Z78 9)   2  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)   3  Bilateral carpal tunnel syndrome (354 0) (G56 03)   4  Cervical pain (723 1) (M54 2)   5  Cervical spinal stenosis (723 0) (M48 02)   6  Chronic low back pain (724 2,338 29) (M54 5,G89 29)   7  Colon cancer screening (V76 51) (Z12 11)   8  DDD (degenerative disc disease), lumbar (722 52) (M51 36)   9  DDD (degenerative disc disease), lumbosacral (722 52) (M51 37)   10  Degeneration of cervical disc without myelopathy (722 4) (M50 30)   11  Esophageal reflux (530 81) (K21 9)   12  Essential hypertension (401 9) (I10)   13  Hyperlipidemia (272 4) (E78 5)   14  Initial Medicare annual wellness visit (V70 0) (Z00 00)   15  Medicare annual wellness visit, subsequent (V70 0) (Z00 00)   16  Need for influenza vaccination (V04 81) (Z23)   17  Need for pneumococcal vaccination (V03 82) (Z23)   18  Need for vaccination with 13-polyvalent pneumococcal conjugate vaccine (V03 82) (Z23)   19  Prediabetes (790 29) (R73 03)   20  Prediabetes (790 29) (R73 09)   21  Seasonal allergies (477 9) (J30 2)   22  Special screening examination for neoplasm of prostate (V76 44) (Z12 5)    Past Medical History    · Need for pneumococcal vaccination (V03 82) (Z23)    The active problems and past medical history were reviewed and updated today  Surgical History    · History of Back Surgery   · History of Hand Incision Tendon Sheath Of A Finger   · History of Sinus Surgery   · History of Tonsillectomy With Adenoidectomy    The surgical history was reviewed and updated today         Family History  Mother    · Family history of Heart Disease (V17 49)  Father    · Family history of Colon Cancer (V16 0)  Family History    · Family history of Family Health Status Of Father -    · Family history of Family Health Status Of Mother -     The family history was reviewed and updated today  Social History    · Advance directive on file (F78 42) (Z78 9)   · Being A Social Drinker   · Denied: History of Drug Use   · Former smoker (V15 82) (E19 901)  The social history was reviewed and updated today  The social history was reviewed and is unchanged  Current Meds   1  Centrum Silver 50+Men Oral Tablet; TAKE 1 TABLET DAILY; Therapy: (Recorded:04Wdd0764) to Recorded   2  Flonase Allergy Relief 50 MCG/ACT Nasal Suspension; INSTILL 2 SPRAYS AT BEDTIME   AS NEEDED; Therapy: 48Gqh9353 to (Evaluate:30Wkr0643)  Requested for: 87Dxg5387; Last   Rx:85Dtc4258 Ordered   3  Omeprazole 20 MG Oral Capsule Delayed Release; TAKE 1 CAPSULE EVERY DAY; Therapy: 12OZN9508 to (Evaluate:2018)  Requested for: 29Sgm0106; Last   Rx:54Dlc6199 Ordered   4  RaNITidine HCl - 300 MG Oral Tablet; Take 1 tablet daily as directed; Therapy: 11PHC8374 to (Glen Perches)  Requested for: 50Ele7008; Last   Rx:50Yni8579 Ordered   5  Simvastatin 20 MG Oral Tablet; TAKE 1 TABLET DAILY; Therapy: 04Rsk9129 to (Evaluate:2017)  Requested for: 34IGK7776; Last   Rx:24Bhj2836 Ordered   6  Vitamin C 500 MG Oral Capsule; TAKE 1 CAPSULE DAILY; Therapy: (Recorded:46Mgc2271) to Recorded    The medication list was reviewed and updated today  Allergies    1  No Known Drug Allergies    2   Seasonal    Immunizations   1 2    Influenza  09-Oct-2013 14-Sep-2016    PCV  24-Aug-2015     PPSV  09-Dec-2013     Tdap  2011     Zoster  2014      Vitals  Signs    Heart Rate: 56  Respiration: 16  Systolic: 251, RUE, Sitting  Diastolic: 92, RUE, Sitting  Height: 5 ft 7 in  Weight: 169 lb 0 2 oz  BMI Calculated: 26 47  BSA Calculated: 1 88  O2 Saturation: 99    Physical Exam    Constitutional   General appearance: No acute distress, well appearing and well nourished  Eyes   Conjunctiva and lids: No erythema, swelling or discharge  Pupils and irises: Equal, round, reactive to light  Ears, Nose, Mouth, and Throat   External inspection of ears and nose: Normal     Otoscopic examination: Tympanic membranes translucent with normal light reflex  Canals patent without erythema  Hearing: Normal     Nasal mucosa, septum, and turbinates: Normal without edema or erythema  Lips, teeth, and gums: Normal, good dentition  Oropharynx: Normal with no erythema, edema, exudate or lesions  Neck   Neck: Supple, symmetric, trachea midline, no masses  Pulmonary   Respiratory effort: No increased work of breathing or signs of respiratory distress  Auscultation of lungs: Clear to auscultation  Cardiovascular   Auscultation of heart: Normal rate and rhythm, normal S1 and S2, no murmurs  Abdominal aorta: Normal     Pedal pulses: 2+ bilaterally  Examination of extremities for edema and/or varicosities: Normal     Abdomen   Abdomen: Non-tender, no masses  Liver and spleen: No hepatomegaly or splenomegaly  Musculoskeletal   Inspection/palpation of digits and nails: Normal without clubbing or cyanosis  Skin   Palpation of skin and subcutaneous tissue: Normal turgor  Neurologic   Cranial nerves: Cranial nerves 2-12 intact  Psychiatric   Mood and affect: Normal        Results/Data  PHQ-2 Adult Depression Screening 63Qio7512 10:12AM User, [facility]     Test Name Result Flag Reference   PHQ-2 Adult Depression Score 0     Over the last two weeks, how often have you been bothered by any of the following problems?   Little interest or pleasure in doing things: Not at all - 0  Feeling down, depressed, or hopeless: Not at all - 0   PHQ-2 Adult Depression Screening Negative       Falls Risk Assessment (Dx Z13 89 Screen for Neurologic Disorder) 80NZG7384 10:11AM User, [facility]     Test Name Result Flag Reference   Falls Risk      No falls in the past year     (1) PSA (SCREEN) (Dx V76 44 Screen for Prostate Cancer) 33RSD7757 11:21AM Con Randhawa   REPORT COMMENT:  FASTING:NO     Test Name Result Flag Reference   PSA, TOTAL 1 4 ng/mL  < OR = 4 0   This test was performed using the Siemens  chemiluminescent method  Values obtained from  different assay methods cannot be used  interchangeably  PSA levels, regardless of  value, should not be interpreted as absolute  evidence of the presence or absence of disease  Future Appointments    Date/Time Provider Specialty Site   09/17/2018 09:00 AM Elpidio Frey DO Internal Medicine [facility]   03/09/2018 08:15 AM ZEENAT Campos   Urology [facility]     Signatures   Electronically signed by : Polo Alex DO; Sep 17 2017 10:59AM EST                       (Author)

## 2018-01-22 VITALS
HEART RATE: 56 BPM | DIASTOLIC BLOOD PRESSURE: 92 MMHG | WEIGHT: 169.01 LBS | BODY MASS INDEX: 26.53 KG/M2 | OXYGEN SATURATION: 99 % | RESPIRATION RATE: 16 BRPM | HEIGHT: 67 IN | SYSTOLIC BLOOD PRESSURE: 168 MMHG

## 2018-01-22 VITALS — SYSTOLIC BLOOD PRESSURE: 174 MMHG | DIASTOLIC BLOOD PRESSURE: 90 MMHG | HEART RATE: 70 BPM

## 2018-01-22 VITALS — SYSTOLIC BLOOD PRESSURE: 140 MMHG | DIASTOLIC BLOOD PRESSURE: 72 MMHG

## 2018-01-23 VITALS — DIASTOLIC BLOOD PRESSURE: 68 MMHG | HEART RATE: 69 BPM | SYSTOLIC BLOOD PRESSURE: 140 MMHG

## 2018-01-23 NOTE — PROGRESS NOTES
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)   3  Bilateral carpal tunnel syndrome (354 0) (G56 03)   4  Cervical pain (723 1) (M54 2)   5  Cervical spinal stenosis (723 0) (M48 02)   6  Chronic low back pain (724 2,338 29) (M54 5,G89 29)   7  Colon cancer screening (V76 51) (Z12 11)   8  DDD (degenerative disc disease), lumbar (722 52) (M51 36)   9  DDD (degenerative disc disease), lumbosacral (722 52) (M51 37)   10  Degeneration of cervical disc without myelopathy (722 4) (M50 30)   11  Esophageal reflux (530 81) (K21 9)   12  Essential hypertension (401 9) (I10)   13  Hyperlipidemia (272 4) (E78 5)   14  Initial Medicare annual wellness visit (V70 0) (Z00 00)   15  Medicare annual wellness visit, subsequent (V70 0) (Z00 00)   16  Need for influenza vaccination (V04 81) (Z23)   17  Need for pneumococcal vaccination (V03 82) (Z23)   18  Need for vaccination with 13-polyvalent pneumococcal conjugate vaccine (V03 82) (Z23)   19  Prediabetes (790 29) (R73 03)   20  Prediabetes (790 29) (R73 09)   21  Seasonal allergies (477 9) (J30 2)   22  Special screening examination for neoplasm of prostate (V76 44) (Z12 5)    Current Meds   1  AmLODIPine Besylate 5 MG Oral Tablet; Take 1 tablet daily; Therapy: 63DFZ6086 to (Gelene Ormond)  Requested for: 42KBP2486; Last   Rx:01Jan2018 Ordered   2  Centrum Silver 50+Men Oral Tablet; TAKE 1 TABLET DAILY; Therapy: (Recorded:26Rrj2163) to Recorded   3  Flonase Allergy Relief 50 MCG/ACT Nasal Suspension; INSTILL 2 SPRAYS AT BEDTIME   AS NEEDED; Therapy: 76Mqu8848 to (Evaluate:55Prs7864)  Requested for: 72Fkz1105; Last   Rx:55Qsu9326 Ordered   4   Metoprolol Succinate ER 50 MG Oral Tablet Extended Release 24 Hour; Take 1 tablet   daily; Therapy: 09NLK1766 to (HGNOBLOZ:90YZB8257)  Requested for: 66BFD3463; Last   Rx:09Jan2018 Ordered   5  Omeprazole 20 MG Oral Capsule Delayed Release; TAKE 1 CAPSULE EVERY DAY; Therapy: 93SDM2462 to (Evaluate:10Mar2018)  Requested for: 79Zsm4454; Last   Rx:23Fas4203 Ordered   6  RaNITidine HCl - 300 MG Oral Tablet; Take 1 tablet daily as directed; Therapy: 51GWZ3546 to (67 488 45 07)  Requested for: 51QCU9341; Last   Rx:30Nov2017 Ordered   7  Simvastatin 20 MG Oral Tablet; take 1 tablet every day; Therapy: 07Tvf2628 to (Evaluate:29Sep2018)  Requested for: 60FPO5993; Last   Rx:02Jan2018 Ordered   8  Vitamin C 500 MG Oral Capsule; TAKE 1 CAPSULE DAILY; Therapy: (Recorded:27Yve3727) to Recorded    Allergies    1  No Known Drug Allergies    2   Seasonal    Future Appointments    Date/Time Provider Specialty Site   09/17/2018 09:00 AM Ryan Solano DO Internal Medicine [facility]     Signatures   Electronically signed by : Angeline Cardona DO; Jan 17 2018  9:16PM EST                       (Author)

## 2018-01-23 NOTE — PROGRESS NOTES
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)   3  Bilateral carpal tunnel syndrome (354 0) (G56 03)   4  Cervical pain (723 1) (M54 2)   5  Cervical spinal stenosis (723 0) (M48 02)   6  Chronic low back pain (724 2,338 29) (M54 5,G89 29)   7  Colon cancer screening (V76 51) (Z12 11)   8  DDD (degenerative disc disease), lumbar (722 52) (M51 36)   9  DDD (degenerative disc disease), lumbosacral (722 52) (M51 37)   10  Degeneration of cervical disc without myelopathy (722 4) (M50 30)   11  Esophageal reflux (530 81) (K21 9)   12  Essential hypertension (401 9) (I10)   13  Hyperlipidemia (272 4) (E78 5)   14  Initial Medicare annual wellness visit (V70 0) (Z00 00)   15  Medicare annual wellness visit, subsequent (V70 0) (Z00 00)   16  Need for influenza vaccination (V04 81) (Z23)   17  Need for pneumococcal vaccination (V03 82) (Z23)   18  Need for vaccination with 13-polyvalent pneumococcal conjugate vaccine (V03 82) (Z23)   19  Prediabetes (790 29) (R73 03)   20  Prediabetes (790 29) (R73 09)   21  Seasonal allergies (477 9) (J30 2)   22  Special screening examination for neoplasm of prostate (V76 44) (Z12 5)    Current Meds   1  AmLODIPine Besylate 5 MG Oral Tablet; Take 1 tablet daily; Therapy: 71OCO2145 to (Evaluate:08Jan2018)  Requested for: 21ZGP1770; Last   Rx:09Nov2017 Ordered   2  Centrum Silver 50+Men Oral Tablet; TAKE 1 TABLET DAILY; Therapy: (Recorded:17Bbx1104) to Recorded   3  Flonase Allergy Relief 50 MCG/ACT Nasal Suspension; INSTILL 2 SPRAYS AT BEDTIME   AS NEEDED; Therapy: 73Ifl1121 to (Evaluate:87Acr9082)  Requested for: 80Efh1458; Last   Rx:64Vvb1173 Ordered   4  Metoprolol Succinate ER 25 MG Oral Tablet Extended Release 24 Hour; Take 1 tablet   daily; Therapy: 97Apf6346 to  Requested for: 25RAH4910 Recorded   5   Metoprolol Succinate ER 50 MG Oral Tablet Extended Release 24 Hour; Take 1 tablet   daily; Therapy: 05FMI9844 to (Evaluate:18Tgz6647)  Requested for: 26Oct2017; Last   Rx:26Oct2017 Ordered   6  Omeprazole 20 MG Oral Capsule Delayed Release; TAKE 1 CAPSULE EVERY DAY; Therapy: 96ZEF7260 to (Evaluate:10Mar2018)  Requested for: 87Gzn9337; Last   Rx:46Xdw7484 Ordered   7  RaNITidine HCl - 300 MG Oral Tablet; Take 1 tablet daily as directed; Therapy: 94GGS8311 to (Fidelia Trip)  Requested for: 64XPO4831; Last   Rx:05Sia8705 Ordered   8  Simvastatin 20 MG Oral Tablet; TAKE 1 TABLET DAILY; Therapy: 66Foq0985 to (Evaluate:13Nov2017)  Requested for: 21EZA9094; Last   Rx:13Kpu3278 Ordered   9  Vitamin C 500 MG Oral Capsule; TAKE 1 CAPSULE DAILY; Therapy: (Recorded:44Fbj2078) to Recorded    Allergies    1  No Known Drug Allergies    2  Seasonal    Vitals  Signs    Heart Rate: 69  Systolic: 671  Diastolic: 68    Future Appointments    Date/Time Provider Specialty Site   09/17/2018 09:00 AM Megha Keith DO Internal Medicine [facility]   03/09/2018 08:15 AM ZEENAT Barreto   Urology [facility]     Signatures   Electronically signed by : Liam Neal DO; Dec  9 2017  7:17AM EST                       (Author)

## 2018-02-02 DIAGNOSIS — Z12.5 ENCOUNTER FOR SCREENING FOR MALIGNANT NEOPLASM OF PROSTATE: ICD-10-CM

## 2018-02-19 ENCOUNTER — PATIENT MESSAGE (OUTPATIENT)
Dept: UROLOGY | Facility: AMBULATORY SURGERY CENTER | Age: 68
End: 2018-02-19

## 2018-02-19 NOTE — TELEPHONE ENCOUNTER
From: Susan Serna  To: Baldemar Rogers MD  Sent: 2/19/2018 4:07 PM EST  Subject: Test Results Question    I have a question about PSA resulted on 2/19/18  Result not shown  Why not?

## 2018-02-26 ENCOUNTER — PATIENT MESSAGE (OUTPATIENT)
Dept: UROLOGY | Facility: AMBULATORY SURGERY CENTER | Age: 68
End: 2018-02-26

## 2018-02-27 NOTE — TELEPHONE ENCOUNTER
From: Jyoti Matters  To: Jose Ramon Zarate MD  Sent: 2/26/2018 7:16 PM EST  Subject: Test Results Question    I have a question about PSA resulted on 2/19/18     3rd notification that PSA result is posted  It is not

## 2018-03-08 DIAGNOSIS — K21.9 GASTROESOPHAGEAL REFLUX DISEASE WITHOUT ESOPHAGITIS: Primary | ICD-10-CM

## 2018-03-08 RX ORDER — OMEPRAZOLE 20 MG/1
CAPSULE, DELAYED RELEASE ORAL
Qty: 90 CAPSULE | Refills: 1 | Status: ON HOLD | OUTPATIENT
Start: 2018-03-08 | End: 2019-01-15

## 2018-03-08 RX ORDER — RANITIDINE 300 MG/1
TABLET ORAL
Qty: 90 TABLET | Refills: 0 | Status: SHIPPED | OUTPATIENT
Start: 2018-03-08 | End: 2018-08-31 | Stop reason: SDUPTHER

## 2018-03-19 ENCOUNTER — OFFICE VISIT (OUTPATIENT)
Dept: UROLOGY | Facility: AMBULATORY SURGERY CENTER | Age: 68
End: 2018-03-19
Payer: COMMERCIAL

## 2018-03-19 VITALS
SYSTOLIC BLOOD PRESSURE: 124 MMHG | DIASTOLIC BLOOD PRESSURE: 80 MMHG | BODY MASS INDEX: 25.61 KG/M2 | HEIGHT: 68 IN | WEIGHT: 169 LBS

## 2018-03-19 DIAGNOSIS — Z12.5 SCREENING FOR PROSTATE CANCER: Primary | ICD-10-CM

## 2018-03-19 PROCEDURE — 99214 OFFICE O/P EST MOD 30 MIN: CPT | Performed by: UROLOGY

## 2018-03-19 RX ORDER — AMLODIPINE BESYLATE 5 MG/1
1 TABLET ORAL DAILY
COMMUNITY
Start: 2017-11-09 | End: 2018-12-18 | Stop reason: SDUPTHER

## 2018-03-19 RX ORDER — SIMVASTATIN 20 MG
1 TABLET ORAL DAILY
COMMUNITY
Start: 2014-09-12 | End: 2018-10-08 | Stop reason: SDUPTHER

## 2018-03-19 RX ORDER — METOPROLOL SUCCINATE 50 MG/1
1 TABLET, EXTENDED RELEASE ORAL DAILY
COMMUNITY
Start: 2017-10-26 | End: 2019-01-18 | Stop reason: SDUPTHER

## 2018-03-19 RX ORDER — FLUTICASONE PROPIONATE 50 MCG
SPRAY, SUSPENSION (ML) NASAL
Status: ON HOLD | COMMUNITY
Start: 2015-08-24 | End: 2019-01-15

## 2018-03-19 RX ORDER — RANITIDINE 300 MG/1
1 TABLET ORAL DAILY
Status: ON HOLD | COMMUNITY
Start: 2013-12-09 | End: 2019-01-15

## 2018-03-19 RX ORDER — MULTIVIT-MIN/IRON/FOLIC ACID/K 18-600-40
1 CAPSULE ORAL DAILY
COMMUNITY

## 2018-03-19 NOTE — LETTER
March 19, 2018     Prerna Macario  [address] [address] [address]    Patient: Karina Person   YOB: 1950   Date of Visit: 3/19/2018       Dear Dr Aga Church: Thank you for referring Merlinda Balboa to me for evaluation  Below are my notes for this consultation  If you have questions, please do not hesitate to call me  I look forward to following your patient along with you  Sincerely,        Eddie Hinkle MD        CC: No Recipients  Eddie Hinkle MD  3/19/2018  1:31 PM  Sign at close encounter  3/19/2018    Karina Person  1950  [id number]        Assessment  Prostate cancer screening    Plan  We discussed asymmetry and firmness of the right side of the prostate  We discussed recommended AUA screening guidelines for prostate cancer screening  Typically I would discontinue screening, however in his case I would like to continue  Patient agrees with this plan  Will check PSA and prostate examination next year  Total visit time was 25 minutes of which over 50% was spent on counseling  History of Present Illness  Erich Bruner is a 77 y o  male presents for prostate cancer screening  He has no urinary symptoms  No family history of prostate cancer  Previously found to have firmness of the right side of his prostate  PSA has been 1 4 and 1 2 previously  Currently PSA 1 5  AUA SYMPTOM SCORE    Flowsheet Row Most Recent Value   AUA SYMPTOM SCORE   How often have you had a sensation of not emptying your bladder completely after you finished urinating? 0   How often have you had to urinate again less than two hours after you finished urinating? 0   How often have you found you stopped and started again several times when you urinate?  0   How often have you found it difficult to postpone urination? 0   How often have you had a weak urinary stream?  0   How often have you had to push or strain to begin urination?   0   How many times did you most typically get up to urinate from the time you went to bed at night until the time you got up in the morning? 1   Quality of Life: If you were to spend the rest of your life with your urinary condition just the way it is now, how would you feel about that?  1   AUA SYMPTOM SCORE  1          Review of Systems  Review of Systems      Past Medical History  Past Medical History:   Diagnosis Date    Hypertension        Past Social History  Past Surgical History:   Procedure Laterality Date    BACK SURGERY      HAND SURGERY      SINUS SURGERY      TONSILLECTOMY AND ADENOIDECTOMY         Past Family History  Family History   Problem Relation Age of Onset    Heart disease Mother     Colon cancer Father        Past Social history  Social History     Social History    Marital status: /Civil Union     Spouse name: N/A    Number of children: N/A    Years of education: N/A     Occupational History    Not on file       Social History Main Topics    Smoking status: Former Smoker    Smokeless tobacco: Never Used    Alcohol use Yes      Comment: social     Drug use: No    Sexual activity: Not on file     Other Topics Concern    Not on file     Social History Narrative    No narrative on file       Current Medications  Current Outpatient Prescriptions   Medication Sig Dispense Refill    amLODIPine (NORVASC) 5 mg tablet Take 1 tablet by mouth daily      Ascorbic Acid (VITAMIN C) 500 MG CAPS Take 1 capsule by mouth daily      fluticasone (FLONASE ALLERGY RELIEF) 50 mcg/act nasal spray into each nostril      metoprolol succinate (TOPROL-XL) 50 mg 24 hr tablet Take 1 tablet by mouth daily      Multiple Vitamins-Minerals (MULTIVITAMIN ADULTS 50+ PO) Take 1 tablet by mouth daily      omeprazole (PriLOSEC) 20 mg delayed release capsule TAKE 1 CAPSULE EVERY DAY 90 capsule 1    ranitidine (ZANTAC) 300 MG tablet TAKE 1 TABLET DAILY AS DIRECTED 90 tablet 0    ranitidine (ZANTAC) 300 MG tablet Take 1 tablet by mouth daily      simvastatin (ZOCOR) 20 mg tablet Take 1 tablet by mouth daily       No current facility-administered medications for this visit  Allergies  No Known Allergies    Past Medical History, Social History, Family History, medications and allergies were reviewed  Vitals  Vitals:    03/19/18 1254   BP: 124/80   BP Location: Left arm   Patient Position: Sitting   Weight: 76 7 kg (169 lb)   Height: 5' 8" (1 727 m)       Physical Exam  Physical Exam   Constitutional: He is oriented to person, place, and time  He appears well-developed and well-nourished  Cardiovascular: Normal rate  Pulmonary/Chest: Effort normal    Abdominal: Soft  Genitourinary:   Genitourinary Comments: About 35 g, firm on the right side   Musculoskeletal: Normal range of motion  Neurological: He is alert and oriented to person, place, and time  Skin: Skin is warm, dry and intact  Psychiatric: He has a normal mood and affect  Vitals reviewed          Results  No results found for: PSA  Lab Results   Component Value Date    CALCIUM 9 5 09/21/2017     09/21/2017    K 3 8 09/21/2017    CO2 26 09/21/2017     09/21/2017    BUN 20 09/21/2017    CREATININE 1 11 09/21/2017     No results found for: WBC, HGB, HCT, MCV, PLT

## 2018-03-19 NOTE — PROGRESS NOTES
3/19/2018    Sergio Byrnes  1950  [id number]        Assessment  Prostate cancer screening    Plan  We discussed asymmetry and firmness of the right side of the prostate  We discussed recommended AUA screening guidelines for prostate cancer screening  Typically I would discontinue screening, however in his case I would like to continue  Patient agrees with this plan  Will check PSA and prostate examination next year  Total visit time was 25 minutes of which over 50% was spent on counseling  History of Present Illness  Preeti Woodard is a 77 y o  male presents for prostate cancer screening  He has no urinary symptoms  No family history of prostate cancer  Previously found to have firmness of the right side of his prostate  PSA has been 1 4 and 1 2 previously  Currently PSA 1 5  AUA SYMPTOM SCORE    Flowsheet Row Most Recent Value   AUA SYMPTOM SCORE   How often have you had a sensation of not emptying your bladder completely after you finished urinating? 0   How often have you had to urinate again less than two hours after you finished urinating? 0   How often have you found you stopped and started again several times when you urinate?  0   How often have you found it difficult to postpone urination? 0   How often have you had a weak urinary stream?  0   How often have you had to push or strain to begin urination? 0   How many times did you most typically get up to urinate from the time you went to bed at night until the time you got up in the morning?   1   Quality of Life: If you were to spend the rest of your life with your urinary condition just the way it is now, how would you feel about that?  1   AUA SYMPTOM SCORE  1          Review of Systems  Review of Systems      Past Medical History  Past Medical History:   Diagnosis Date    Hypertension        Past Social History  Past Surgical History:   Procedure Laterality Date    BACK SURGERY      HAND SURGERY      SINUS SURGERY      TONSILLECTOMY AND ADENOIDECTOMY         Past Family History  Family History   Problem Relation Age of Onset    Heart disease Mother     Colon cancer Father        Past Social history  Social History     Social History    Marital status: /Civil Union     Spouse name: N/A    Number of children: N/A    Years of education: N/A     Occupational History    Not on file  Social History Main Topics    Smoking status: Former Smoker    Smokeless tobacco: Never Used    Alcohol use Yes      Comment: social     Drug use: No    Sexual activity: Not on file     Other Topics Concern    Not on file     Social History Narrative    No narrative on file       Current Medications  Current Outpatient Prescriptions   Medication Sig Dispense Refill    amLODIPine (NORVASC) 5 mg tablet Take 1 tablet by mouth daily      Ascorbic Acid (VITAMIN C) 500 MG CAPS Take 1 capsule by mouth daily      fluticasone (FLONASE ALLERGY RELIEF) 50 mcg/act nasal spray into each nostril      metoprolol succinate (TOPROL-XL) 50 mg 24 hr tablet Take 1 tablet by mouth daily      Multiple Vitamins-Minerals (MULTIVITAMIN ADULTS 50+ PO) Take 1 tablet by mouth daily      omeprazole (PriLOSEC) 20 mg delayed release capsule TAKE 1 CAPSULE EVERY DAY 90 capsule 1    ranitidine (ZANTAC) 300 MG tablet TAKE 1 TABLET DAILY AS DIRECTED 90 tablet 0    ranitidine (ZANTAC) 300 MG tablet Take 1 tablet by mouth daily      simvastatin (ZOCOR) 20 mg tablet Take 1 tablet by mouth daily       No current facility-administered medications for this visit  Allergies  No Known Allergies    Past Medical History, Social History, Family History, medications and allergies were reviewed  Vitals  Vitals:    03/19/18 1254   BP: 124/80   BP Location: Left arm   Patient Position: Sitting   Weight: 76 7 kg (169 lb)   Height: 5' 8" (1 727 m)       Physical Exam  Physical Exam   Constitutional: He is oriented to person, place, and time   He appears well-developed and well-nourished  Cardiovascular: Normal rate  Pulmonary/Chest: Effort normal    Abdominal: Soft  Genitourinary:   Genitourinary Comments: About 35 g, firm on the right side   Musculoskeletal: Normal range of motion  Neurological: He is alert and oriented to person, place, and time  Skin: Skin is warm, dry and intact  Psychiatric: He has a normal mood and affect  Vitals reviewed          Results  No results found for: PSA  Lab Results   Component Value Date    CALCIUM 9 5 09/21/2017     09/21/2017    K 3 8 09/21/2017    CO2 26 09/21/2017     09/21/2017    BUN 20 09/21/2017    CREATININE 1 11 09/21/2017     No results found for: WBC, HGB, HCT, MCV, PLT

## 2018-05-14 DIAGNOSIS — K21.9 GASTROESOPHAGEAL REFLUX DISEASE WITHOUT ESOPHAGITIS: Primary | ICD-10-CM

## 2018-05-14 RX ORDER — RANITIDINE 300 MG/1
TABLET ORAL
Qty: 90 TABLET | Refills: 0 | Status: SHIPPED | OUTPATIENT
Start: 2018-05-14 | End: 2018-09-17 | Stop reason: ALTCHOICE

## 2018-08-31 DIAGNOSIS — K21.9 GASTROESOPHAGEAL REFLUX DISEASE WITHOUT ESOPHAGITIS: ICD-10-CM

## 2018-09-02 RX ORDER — RANITIDINE 300 MG/1
300 TABLET ORAL DAILY
Qty: 90 TABLET | Refills: 1 | Status: SHIPPED | OUTPATIENT
Start: 2018-09-02 | End: 2018-09-17 | Stop reason: ALTCHOICE

## 2018-09-17 ENCOUNTER — OFFICE VISIT (OUTPATIENT)
Dept: INTERNAL MEDICINE CLINIC | Facility: CLINIC | Age: 68
End: 2018-09-17
Payer: COMMERCIAL

## 2018-09-17 ENCOUNTER — HOSPITAL ENCOUNTER (OUTPATIENT)
Dept: RADIOLOGY | Facility: HOSPITAL | Age: 68
Discharge: HOME | End: 2018-09-17
Payer: COMMERCIAL

## 2018-09-17 VITALS
SYSTOLIC BLOOD PRESSURE: 142 MMHG | RESPIRATION RATE: 16 BRPM | DIASTOLIC BLOOD PRESSURE: 78 MMHG | HEIGHT: 68 IN | OXYGEN SATURATION: 99 % | BODY MASS INDEX: 26.16 KG/M2 | WEIGHT: 172.6 LBS | HEART RATE: 66 BPM

## 2018-09-17 DIAGNOSIS — M24.811 CREPITUS OF RIGHT SHOULDER JOINT: ICD-10-CM

## 2018-09-17 DIAGNOSIS — Z23 NEED FOR INFLUENZA VACCINATION: ICD-10-CM

## 2018-09-17 DIAGNOSIS — Z12.11 SCREENING FOR COLON CANCER: ICD-10-CM

## 2018-09-17 DIAGNOSIS — M79.645 PAIN OF LEFT THUMB: ICD-10-CM

## 2018-09-17 DIAGNOSIS — Z00.00 MEDICARE ANNUAL WELLNESS VISIT, SUBSEQUENT: ICD-10-CM

## 2018-09-17 DIAGNOSIS — R73.9 ELEVATED BLOOD SUGAR: ICD-10-CM

## 2018-09-17 DIAGNOSIS — K21.9 GASTROESOPHAGEAL REFLUX DISEASE WITHOUT ESOPHAGITIS: Primary | ICD-10-CM

## 2018-09-17 DIAGNOSIS — Z13.6 SCREENING FOR CARDIOVASCULAR CONDITION: ICD-10-CM

## 2018-09-17 PROCEDURE — 99214 OFFICE O/P EST MOD 30 MIN: CPT | Performed by: INTERNAL MEDICINE

## 2018-09-17 PROCEDURE — 3725F SCREEN DEPRESSION PERFORMED: CPT | Performed by: INTERNAL MEDICINE

## 2018-09-17 PROCEDURE — 73030 X-RAY EXAM OF SHOULDER: CPT

## 2018-09-17 PROCEDURE — 1036F TOBACCO NON-USER: CPT | Performed by: INTERNAL MEDICINE

## 2018-09-17 PROCEDURE — 1125F AMNT PAIN NOTED PAIN PRSNT: CPT | Performed by: INTERNAL MEDICINE

## 2018-09-17 PROCEDURE — 1101F PT FALLS ASSESS-DOCD LE1/YR: CPT | Performed by: INTERNAL MEDICINE

## 2018-09-17 PROCEDURE — 1170F FXNL STATUS ASSESSED: CPT | Performed by: INTERNAL MEDICINE

## 2018-09-17 PROCEDURE — G0439 PPPS, SUBSEQ VISIT: HCPCS | Performed by: INTERNAL MEDICINE

## 2018-09-17 PROCEDURE — G0008 ADMIN INFLUENZA VIRUS VAC: HCPCS

## 2018-09-17 PROCEDURE — 4040F PNEUMOC VAC/ADMIN/RCVD: CPT

## 2018-09-17 PROCEDURE — 90662 IIV NO PRSV INCREASED AG IM: CPT

## 2018-09-17 PROCEDURE — 73130 X-RAY EXAM OF HAND: CPT

## 2018-09-17 RX ORDER — FAMOTIDINE 20 MG/1
20 TABLET, FILM COATED ORAL DAILY
Qty: 90 TABLET | Refills: 1 | Status: SHIPPED | OUTPATIENT
Start: 2018-09-17 | End: 2018-12-03 | Stop reason: SDUPTHER

## 2018-09-17 NOTE — PROGRESS NOTES
Assessment and Plan:    Problem List Items Addressed This Visit        Other    Medicare annual wellness visit, subsequent     Assessment and plan 1  Subsequent Medicare annual wellness examination overall the patient is clinically stable and doing well, we encouraged the patient to follow a healthy and balanced diet  We recommend that the patient exercise routinely approximately 30 minutes 5 times per week   We have reviewed the patient's vaccines and have made recommendations for updates if necessary flu vaccine, shingles vaccine       We will be ordering screening laboratories which are age appropriate  Return to the office in    12 months    call if any problems  Other Visit Diagnoses     Need for influenza vaccination    -  Primary    Relevant Orders    influenza vaccine, [id number], high-dose, PF 0 5 mL, for patients 65 yr+ (FLUZONE HIGH-DOSE) (Completed)    Gastroesophageal reflux disease without esophagitis        Relevant Medications    famotidine (PEPCID) 20 mg tablet    Screening for cardiovascular condition        Relevant Orders    Lipid Panel with Direct LDL reflex    Elevated blood sugar        Relevant Orders    Comprehensive metabolic panel    Hemoglobin A1C    Crepitus of right shoulder joint        Relevant Orders    XR shoulder 2+ vw right    Pain of left thumb        Relevant Orders    XR hand 3+ vw left    Screening for colon cancer        Relevant Orders    Ambulatory referral to Gastroenterology        Health Maintenance Due   Topic Date Due    INFLUENZA VACCINE  09/01/2018     Return to office 12  months  call if any problems    HPI:Patient does monitor the blood pressures at home and on average is running 120-130 range 63A diastolic  Lawrence Huang is a 77 y o  male here for his Subsequent Wellness Visit      Patient Active Problem List   Diagnosis    Medicare annual wellness visit, subsequent     Past Medical History:   Diagnosis Date    Hypertension      Past Surgical History:   Procedure Laterality Date    BACK SURGERY      HAND SURGERY      Incision Tendon Sheath Of a Finger    SINUS SURGERY      TONSILLECTOMY AND ADENOIDECTOMY       Family History   Problem Relation Age of Onset    Heart disease Mother     Colon cancer Father      History   Smoking Status    Former Smoker   Smokeless Tobacco    Never Used     History   Alcohol Use    Yes     Comment: social       History   Drug Use No       Current Outpatient Prescriptions   Medication Sig Dispense Refill    amLODIPine (NORVASC) 5 mg tablet Take 1 tablet by mouth daily      Ascorbic Acid (VITAMIN C) 500 MG CAPS Take 1 capsule by mouth daily      fluticasone (FLONASE ALLERGY RELIEF) 50 mcg/act nasal spray into each nostril      metoprolol succinate (TOPROL-XL) 50 mg 24 hr tablet Take 1 tablet by mouth daily      Multiple Vitamins-Minerals (MULTIVITAMIN ADULTS 50+ PO) Take 1 tablet by mouth daily      omeprazole (PriLOSEC) 20 mg delayed release capsule TAKE 1 CAPSULE EVERY DAY 90 capsule 1    ranitidine (ZANTAC) 300 MG tablet Take 1 tablet by mouth daily      simvastatin (ZOCOR) 20 mg tablet Take 1 tablet by mouth daily      famotidine (PEPCID) 20 mg tablet Take 1 tablet (20 mg total) by mouth daily 90 tablet 1     No current facility-administered medications for this visit  No Known Allergies  Immunization History   Administered Date(s) Administered    Influenza Split High Dose Preservative Free IM 09/14/2016    Influenza TIV (IM) 10/09/2013    Influenza, high dose seasonal 0 5 mL 09/17/2018    Pneumococcal Conjugate 13-Valent 08/24/2015    Pneumococcal Polysaccharide PPV23 12/09/2013    Tdap 02/24/2011    Zoster 01/06/2014       Patient Care Team:  Mike Cid DO as PCP - DO Munir Rice MD Jeppie Diener, DO    Medicare Screening Tests and Risk Assessments:      Health Risk Assessment:  Patient rates overall health as good   Patient feels that their physical health rating is Same  Eyesight was rated as Same  Hearing was rated as Same  Patient feels that their emotional and mental health rating is Same  Pain experienced by patient in the last 7 days has been None  Patient states that he has experienced no weight loss or gain in last 6 months  Emotional/Mental Health:  Patient has been feeling nervous/anxious  PHQ-9 Depression Screening:    Frequency of the following problems over the past two weeks:      1  Little interest or pleasure in doing things: 0 - not at all      2  Feeling down, depressed, or hopeless: 0 - not at all  PHQ-2 Score: 0          Broken Bones/Falls: Fall Risk Assessment:    In the past year, patient has experienced: No history of falling in past year          Bladder/Bowel:  Patient has not leaked urine accidently in the last six months  Patient reports no loss of bowel control  Immunizations:  Patient has had a flu vaccination within the last year  Patient has received a pneumonia shot  Patient has received a shingles shot  Patient has received tetanus/diphtheria shot  Home Safety:  Patient does not have trouble with stairs inside or outside of their home  Patient currently reports that there are no safety hazards present in home, working smoke alarms, working carbon monoxide detectors  Preventative Screenings:   prostate cancer screen performed, colon cancer screen completed, cholesterol screen completed, glaucoma eye exam completed,     Nutrition:  Current diet: Regular with servings of the following:    Medications:  Patient is currently taking over-the-counter supplements  List of OTC medications includes: vitamin c,  Patient is able to manage medications  Lifestyle Choices:  Patient reports no tobacco use  Patient has smoked or used tobacco in the past   Patient has not stopped tobacco use  Patient reports alcohol use  Alcohol use per week: 3/week  Patient drives a vehicle    Patient wears seat belt  Activities of Daily Living:  Can get out of bed by his or her self, able to dress self, able to make own meals, able to do own shopping, able to bathe self, can do own laundry/housekeeping, can manage own money, pay bills and track expenses    Previous Hospitalizations:  No hospitalization or ED visit in past 12 months        Advanced Directives:  Patient has decided on a power of   Patient has spoken to designated power of   Patient has completed advanced directive

## 2018-09-18 NOTE — ASSESSMENT & PLAN NOTE
Clinically stable doing well I will have the patient discontinue Zantac which is not as effective cancer on Pepcid 20 mg once a day I would like him to discontinue Prilosec because of potential side effects he has a contact me if the symptoms not improved

## 2018-09-18 NOTE — PROGRESS NOTES
Assessment/Plan:    Medicare annual wellness visit, subsequent  Assessment and plan 1  Subsequent Medicare annual wellness examination overall the patient is clinically stable and doing well, we encouraged the patient to follow a healthy and balanced diet  We recommend that the patient exercise routinely approximately 30 minutes 5 times per week   We have reviewed the patient's vaccines and have made recommendations for updates if necessary flu vaccine, shingles vaccine       We will be ordering screening laboratories which are age appropriate  Return to the office in    12 months    call if any problems  Screening for colon cancer  Counseled, will order a screening colonoscopy    Gastroesophageal reflux disease without esophagitis  Clinically stable doing well I will have the patient discontinue Zantac which is not as effective cancer on Pepcid 20 mg once a day I would like him to discontinue Prilosec because of potential side effects he has a contact me if the symptoms not improved    Pain of left thumb  Suspect osteoarthritis will check x-ray of the hand    Elevated blood sugar  Pre diabetes -I have counseled the patient to follow a healthy balanced diet, I have counseled patient reduce carbohydrates and sweets in the diet, I would like the patient exercise routinely  I will be checking hemoglobin A1c and comprehensive metabolic panel  Have counseled patient about the prevention of diabetes, and the risk of progression to type 2 diabetes      Crepitus of right shoulder joint  Rule out osteoarthritis will check x-ray of the right shoulder         Problem List Items Addressed This Visit        Digestive    Gastroesophageal reflux disease without esophagitis - Primary     Clinically stable doing well I will have the patient discontinue Zantac which is not as effective cancer on Pepcid 20 mg once a day I would like him to discontinue Prilosec because of potential side effects he has a contact me if the symptoms not improved         Relevant Medications    famotidine (PEPCID) 20 mg tablet       Other    Medicare annual wellness visit, subsequent     Assessment and plan 1  Subsequent Medicare annual wellness examination overall the patient is clinically stable and doing well, we encouraged the patient to follow a healthy and balanced diet  We recommend that the patient exercise routinely approximately 30 minutes 5 times per week   We have reviewed the patient's vaccines and have made recommendations for updates if necessary flu vaccine, shingles vaccine       We will be ordering screening laboratories which are age appropriate  Return to the office in    12 months    call if any problems  Elevated blood sugar     Pre diabetes -I have counseled the patient to follow a healthy balanced diet, I have counseled patient reduce carbohydrates and sweets in the diet, I would like the patient exercise routinely  I will be checking hemoglobin A1c and comprehensive metabolic panel  Have counseled patient about the prevention of diabetes, and the risk of progression to type 2 diabetes  Relevant Orders    Comprehensive metabolic panel    Hemoglobin A1C    Screening for colon cancer     Counseled, will order a screening colonoscopy         Relevant Orders    Ambulatory referral to Gastroenterology    Crepitus of right shoulder joint     Rule out osteoarthritis will check x-ray of the right shoulder         Relevant Orders    XR shoulder 2+ vw right    Pain of left thumb     Suspect osteoarthritis will check x-ray of the hand         Relevant Orders    XR hand 3+ vw left      Other Visit Diagnoses     Need for influenza vaccination        Relevant Orders    influenza vaccine, [id number], high-dose, PF 0 5 mL, for patients 65 yr+ (FLUZONE HIGH-DOSE) (Completed)          Return to office 12  months  call if any problems  Subjective:      Patient ID: Lizeth Dominique is a 77 y o  male      HPI 74-year old male coming in for a follow up visit regarding GERD, elevated blood sugar, crepitus of the right shoulder, pain of the left thumb; The patient reports me compliant taking medications without untoward side effects the  The patient is here to review his medical condition, update me on the medical condition and the patient reports me no hospitalizations and no ER visits  He reports me he is doing a good job at his diet and continues to be active he has noticed pain in the MCP joint of the 1st thumb no injury  He also reports me his GERD is fairly well controlled with Zantac he does take on occasion omeprazole  The patient reports me right shoulder clicking and popping but no pain and no limitation of range of motion  The following portions of the patient's history were reviewed and updated as appropriate: allergies, current medications, past family history, past medical history, past social history, past surgical history and problem list     Review of Systems   Constitutional: Negative for activity change, appetite change and unexpected weight change  HENT: Negative for congestion and postnasal drip  Eyes: Negative for visual disturbance  Respiratory: Negative for cough and shortness of breath  Cardiovascular: Negative for chest pain  Gastrointestinal: Negative for abdominal pain, diarrhea, nausea and vomiting  Musculoskeletal:        Left thumb pain, right shoulder pain   Neurological: Negative for dizziness, light-headedness and headaches  Psychiatric/Behavioral: The patient is not nervous/anxious            Objective:      /78 (BP Location: Right arm, Patient Position: Sitting, Cuff Size: Standard)   Pulse 66   Resp 16   Ht 5' 8" (1 727 m)   Wt 78 3 kg (172 lb 9 6 oz)   SpO2 99%   BMI 26 24 kg/m²     Right shoulder crepitus with range of motion but patient has a full range of motion without pain negative impingement sign, examination of the left 1st MCP joint there is crepitus and mild tenderness full range of motion no synovitis     Physical Exam   Constitutional: He appears well-developed and well-nourished  No distress  HENT:   Head: Normocephalic and atraumatic  Right Ear: External ear normal    Left Ear: External ear normal    Mouth/Throat: Oropharynx is clear and moist    Eyes: Conjunctivae are normal  Pupils are equal, round, and reactive to light  Right eye exhibits no discharge  Left eye exhibits no discharge  No scleral icterus  Neck: Neck supple  Cardiovascular: Normal rate, regular rhythm and normal heart sounds  Exam reveals no gallop and no friction rub  No murmur heard  Pulmonary/Chest: No respiratory distress  He has no wheezes  He has no rales  Abdominal: Soft  Bowel sounds are normal  He exhibits no distension and no mass  There is no tenderness  There is no rebound and no guarding  Musculoskeletal: He exhibits no edema or deformity  Lymphadenopathy:     He has no cervical adenopathy  Neurological: He is alert  Skin: He is not diaphoretic  Psychiatric: He has a normal mood and affect

## 2018-09-18 NOTE — ASSESSMENT & PLAN NOTE
Assessment and plan 1  Subsequent Medicare annual wellness examination overall the patient is clinically stable and doing well, we encouraged the patient to follow a healthy and balanced diet  We recommend that the patient exercise routinely approximately 30 minutes 5 times per week   We have reviewed the patient's vaccines and have made recommendations for updates if necessary flu vaccine, shingles vaccine       We will be ordering screening laboratories which are age appropriate  Return to the office in    12 months    call if any problems

## 2018-09-21 LAB
ALBUMIN SERPL-MCNC: 4.5 G/DL (ref 3.6–5.1)
ALBUMIN/GLOB SERPL: 1.8 (CALC) (ref 1–2.5)
ALP SERPL-CCNC: 81 U/L (ref 40–115)
ALT SERPL-CCNC: 21 U/L (ref 9–46)
AST SERPL-CCNC: 26 U/L (ref 10–35)
BILIRUB SERPL-MCNC: 0.5 MG/DL (ref 0.2–1.2)
BUN SERPL-MCNC: 21 MG/DL (ref 7–25)
BUN/CREAT SERPL: ABNORMAL (CALC) (ref 6–22)
CALCIUM SERPL-MCNC: 9.9 MG/DL (ref 8.6–10.3)
CHLORIDE SERPL-SCNC: 101 MMOL/L (ref 98–110)
CHOLEST SERPL-MCNC: 188 MG/DL
CHOLEST/HDLC SERPL: 2.3 (CALC)
CO2 SERPL-SCNC: 29 MMOL/L (ref 20–32)
CREAT SERPL-MCNC: 1.05 MG/DL (ref 0.7–1.18)
GLOBULIN SER CALC-MCNC: 2.5 G/DL (CALC) (ref 1.9–3.7)
GLUCOSE SERPL-MCNC: 102 MG/DL (ref 65–99)
HBA1C MFR BLD: 5.1 % OF TOTAL HGB
HDLC SERPL-MCNC: 81 MG/DL
LDLC SERPL CALC-MCNC: 91 MG/DL (CALC)
NONHDLC SERPL-MCNC: 107 MG/DL (CALC)
POTASSIUM SERPL-SCNC: 4.2 MMOL/L (ref 3.5–5.3)
PROT SERPL-MCNC: 7 G/DL (ref 6.1–8.1)
SL AMB EGFR AFRICAN AMERICAN: 83 ML/MIN/1.73M2
SL AMB EGFR NON AFRICAN AMERICAN: 72 ML/MIN/1.73M2
SODIUM SERPL-SCNC: 139 MMOL/L (ref 135–146)
TRIGL SERPL-MCNC: 73 MG/DL

## 2018-10-08 DIAGNOSIS — R03.0 ELEVATED BLOOD PRESSURE READING: Primary | ICD-10-CM

## 2018-10-08 DIAGNOSIS — E78.5 HYPERLIPIDEMIA, UNSPECIFIED HYPERLIPIDEMIA TYPE: ICD-10-CM

## 2018-10-08 RX ORDER — SIMVASTATIN 20 MG
TABLET ORAL
Qty: 90 TABLET | Refills: 2 | Status: SHIPPED | OUTPATIENT
Start: 2018-10-08 | End: 2019-06-19 | Stop reason: SDUPTHER

## 2018-12-03 DIAGNOSIS — K21.9 GASTROESOPHAGEAL REFLUX DISEASE WITHOUT ESOPHAGITIS: ICD-10-CM

## 2018-12-03 RX ORDER — FAMOTIDINE 20 MG/1
20 TABLET, FILM COATED ORAL DAILY
Qty: 90 TABLET | Refills: 1 | Status: SHIPPED | OUTPATIENT
Start: 2018-12-03 | End: 2019-04-29 | Stop reason: SDUPTHER

## 2018-12-18 DIAGNOSIS — I10 ESSENTIAL HYPERTENSION: Primary | ICD-10-CM

## 2018-12-18 RX ORDER — AMLODIPINE BESYLATE 5 MG/1
TABLET ORAL
Qty: 90 TABLET | Refills: 3 | Status: SHIPPED | OUTPATIENT
Start: 2018-12-18 | End: 2019-10-16 | Stop reason: SDUPTHER

## 2018-12-26 ENCOUNTER — OFFICE VISIT (OUTPATIENT)
Dept: GASTROENTEROLOGY | Facility: AMBULARY SURGERY CENTER | Age: 68
End: 2018-12-26
Payer: COMMERCIAL

## 2018-12-26 VITALS
DIASTOLIC BLOOD PRESSURE: 88 MMHG | TEMPERATURE: 97.3 F | HEART RATE: 74 BPM | HEIGHT: 68 IN | SYSTOLIC BLOOD PRESSURE: 150 MMHG | WEIGHT: 173.6 LBS | BODY MASS INDEX: 26.31 KG/M2

## 2018-12-26 DIAGNOSIS — Z12.11 SCREENING FOR COLON CANCER: ICD-10-CM

## 2018-12-26 DIAGNOSIS — Z83.71 FAMILY HISTORY OF COLONIC POLYPS: Primary | ICD-10-CM

## 2018-12-26 PROBLEM — Z83.719 FAMILY HISTORY OF COLONIC POLYPS: Status: ACTIVE | Noted: 2018-12-26

## 2018-12-26 PROCEDURE — 99204 OFFICE O/P NEW MOD 45 MIN: CPT | Performed by: INTERNAL MEDICINE

## 2018-12-26 NOTE — LETTER
December 26, 2018     Prerna Patel  [address] [facility] [address]    Patient: Viki Watkins   YOB: 1950   Date of Visit: 12/26/2018       Dear Dr Kelechi Reed: Thank you for referring Renata Wilkinson to me for evaluation  Below are my notes for this consultation  If you have questions, please do not hesitate to call me  I look forward to following your patient along with you  Sincerely,        Isaiah Escamilla MD        CC: No Recipients  Isaiah Escamilla MD  12/26/2018  9:41 AM  Sign at close encounter  Consultation - [facility]  Viki Watkins 77 y o  male MRN: [id number]          Assessment & Plan:    Very pleasant and healthy 64-year-old gentleman with a family history of colon polyps in his father  Last colonoscopy was 5 years ago was normal     1   Family history of colon polyps:  -we will schedule patient for colonoscopy  -discussed with the patient, if this examination is normal, would likely schedule 10 year recall  -discussed with him the risks of the procedure including bleeding, surgery, perforation, missed polyp detection rate      _____________________________________________________________        CC:  Family history of colon polyps, here for evaluation for colonoscopy    HPI:  Viki Watkins is a 77 y o male who was referred for evaluation of family history of colon polyps  The patient reports that his father had a vague history of colon polyps, it is unclear as he was estranged  Patient has been receiving colonoscopies every 5 years, his last examination was normal at that time  He denies any changes symptoms  Denies any nausea, vomiting, abdominal pain, diarrhea, constipation, melena, rectal bleeding  He reports occasional intermittent reflux symptoms, he has been on PPI therapy and ranitidine for the last 8 or 9 years, recently switched to Pepcid alone in the evening  This seems to control his symptoms very well    He denies any red flag symptoms or dysphagia  Reports that he had an upper endoscopy in the past which was unremarkable  Patient denies any tobacco prep  He rarely drinks  He is retired   His wife works for orthopedic surgeon in [employer] to [place]  Family history as noted above  Patient has had surgical history of bilateral carpal tunnel repair  He has also had laminectomy and diskectomy  ROS:  The remainder of the ROS was negative except for the pertinent positives mentioned in HPI  Allergies: Patient has no known allergies      Medications:   Current Outpatient Prescriptions:     amLODIPine (NORVASC) 5 mg tablet, TAKE 1 TABLET EVERY DAY, Disp: 90 tablet, Rfl: 3    Ascorbic Acid (VITAMIN C) 500 MG CAPS, Take 1 capsule by mouth daily, Disp: , Rfl:     famotidine (PEPCID) 20 mg tablet, Take 1 tablet (20 mg total) by mouth daily, Disp: 90 tablet, Rfl: 1    fluticasone (FLONASE ALLERGY RELIEF) 50 mcg/act nasal spray, into each nostril, Disp: , Rfl:     metoprolol succinate (TOPROL-XL) 50 mg 24 hr tablet, Take 1 tablet by mouth daily, Disp: , Rfl:     Multiple Vitamins-Minerals (MULTIVITAMIN ADULTS 50+ PO), Take 1 tablet by mouth daily, Disp: , Rfl:     simvastatin (ZOCOR) 20 mg tablet, TAKE 1 TABLET EVERY DAY, Disp: 90 tablet, Rfl: 2    Na Sulfate-K Sulfate-Mg Sulf (SUPREP BOWEL PREP KIT) 17 5-3 13-1 6 GM/177ML SOLN, Take 1 Bottle by mouth once for 1 dose, Disp: 1 Bottle, Rfl: 0    omeprazole (PriLOSEC) 20 mg delayed release capsule, TAKE 1 CAPSULE EVERY DAY (Patient not taking: Reported on 12/26/2018), Disp: 90 capsule, Rfl: 1    ranitidine (ZANTAC) 300 MG tablet, Take 1 tablet by mouth daily, Disp: , Rfl: '    Past Medical History:   Diagnosis Date    Hypertension        Past Surgical History:   Procedure Laterality Date    BACK SURGERY      HAND SURGERY      Incision Tendon Sheath Of a Finger    SINUS SURGERY      TONSILLECTOMY AND ADENOIDECTOMY         Family History Problem Relation Age of Onset    Heart disease Mother     Colon cancer Father         reports that he has quit smoking  He has never used smokeless tobacco  He reports that he drinks alcohol  He reports that he does not use drugs            Physical Exam:     /88 (BP Location: Left arm, Patient Position: Sitting, Cuff Size: Standard)   Pulse 74   Temp (!) 97 3 °F (36 3 °C) (Tympanic)   Ht 5' 8" (1 727 m)   Wt 78 7 kg (173 lb 9 6 oz)   BMI 26 40 kg/m²      Gen: wn/wd, NAD, healthy-appearing male  HEENT: anicteric, MMM, no cervical LAD  CVS: RRR, no m/r/g  CHEST: CTA b/l  ABD: +BS, soft, NT,ND, no hepatosplenomegaly  EXT: no c/c/e  NEURO: aaox3  SKIN: NO rashes

## 2018-12-26 NOTE — PROGRESS NOTES
Consultation -  [facility]  Marcel Ledezma 77 y o  male MRN: [id number]          Assessment & Plan:    Very pleasant and healthy 38-year-old gentleman with a family history of colon polyps in his father  Last colonoscopy was 5 years ago was normal     1   Family history of colon polyps:  -we will schedule patient for colonoscopy  -discussed with the patient, if this examination is normal, would likely schedule 10 year recall  -discussed with him the risks of the procedure including bleeding, surgery, perforation, missed polyp detection rate      _____________________________________________________________        CC:  Family history of colon polyps, here for evaluation for colonoscopy    HPI:  Marcel Ledezma is a 77 y o male who was referred for evaluation of family history of colon polyps  The patient reports that his father had a vague history of colon polyps, it is unclear as he was estranged  Patient has been receiving colonoscopies every 5 years, his last examination was normal at that time  He denies any changes symptoms  Denies any nausea, vomiting, abdominal pain, diarrhea, constipation, melena, rectal bleeding  He reports occasional intermittent reflux symptoms, he has been on PPI therapy and ranitidine for the last 8 or 9 years, recently switched to Pepcid alone in the evening  This seems to control his symptoms very well  He denies any red flag symptoms or dysphagia  Reports that he had an upper endoscopy in the past which was unremarkable  Patient denies any tobacco prep  He rarely drinks  He is retired   His wife works for orthopedic surgeon in [facility]  Family history as noted above  Patient has had surgical history of bilateral carpal tunnel repair  He has also had laminectomy and diskectomy  ROS:  The remainder of the ROS was negative except for the pertinent positives mentioned in HPI           Allergies: Patient has no known allergies  Medications:   Current Outpatient Prescriptions:     amLODIPine (NORVASC) 5 mg tablet, TAKE 1 TABLET EVERY DAY, Disp: 90 tablet, Rfl: 3    Ascorbic Acid (VITAMIN C) 500 MG CAPS, Take 1 capsule by mouth daily, Disp: , Rfl:     famotidine (PEPCID) 20 mg tablet, Take 1 tablet (20 mg total) by mouth daily, Disp: 90 tablet, Rfl: 1    fluticasone (FLONASE ALLERGY RELIEF) 50 mcg/act nasal spray, into each nostril, Disp: , Rfl:     metoprolol succinate (TOPROL-XL) 50 mg 24 hr tablet, Take 1 tablet by mouth daily, Disp: , Rfl:     Multiple Vitamins-Minerals (MULTIVITAMIN ADULTS 50+ PO), Take 1 tablet by mouth daily, Disp: , Rfl:     simvastatin (ZOCOR) 20 mg tablet, TAKE 1 TABLET EVERY DAY, Disp: 90 tablet, Rfl: 2    Na Sulfate-K Sulfate-Mg Sulf (SUPREP BOWEL PREP KIT) 17 5-3 13-1 6 GM/177ML SOLN, Take 1 Bottle by mouth once for 1 dose, Disp: 1 Bottle, Rfl: 0    omeprazole (PriLOSEC) 20 mg delayed release capsule, TAKE 1 CAPSULE EVERY DAY (Patient not taking: Reported on 12/26/2018), Disp: 90 capsule, Rfl: 1    ranitidine (ZANTAC) 300 MG tablet, Take 1 tablet by mouth daily, Disp: , Rfl: '    Past Medical History:   Diagnosis Date    Hypertension        Past Surgical History:   Procedure Laterality Date    BACK SURGERY      HAND SURGERY      Incision Tendon Sheath Of a Finger    SINUS SURGERY      TONSILLECTOMY AND ADENOIDECTOMY         Family History   Problem Relation Age of Onset    Heart disease Mother     Colon cancer Father         reports that he has quit smoking  He has never used smokeless tobacco  He reports that he drinks alcohol  He reports that he does not use drugs            Physical Exam:     /88 (BP Location: Left arm, Patient Position: Sitting, Cuff Size: Standard)   Pulse 74   Temp (!) 97 3 °F (36 3 °C) (Tympanic)   Ht 5' 8" (1 727 m)   Wt 78 7 kg (173 lb 9 6 oz)   BMI 26 40 kg/m²     Gen: wn/wd, NAD, healthy-appearing male  HEENT: anicteric, MMM, no cervical LAD  CVS: RRR, no m/r/g  CHEST: CTA b/l  ABD: +BS, soft, NT,ND, no hepatosplenomegaly  EXT: no c/c/e  NEURO: aaox3  SKIN: NO rashes

## 2019-01-02 ENCOUNTER — ANESTHESIA EVENT (OUTPATIENT)
Dept: PERIOP | Facility: AMBULARY SURGERY CENTER | Age: 69
End: 2019-01-02
Payer: COMMERCIAL

## 2019-01-15 ENCOUNTER — HOSPITAL ENCOUNTER (OUTPATIENT)
Facility: AMBULARY SURGERY CENTER | Age: 69
Setting detail: OUTPATIENT SURGERY
Discharge: HOME | End: 2019-01-15
Attending: INTERNAL MEDICINE | Admitting: INTERNAL MEDICINE
Payer: COMMERCIAL

## 2019-01-15 ENCOUNTER — ANESTHESIA (OUTPATIENT)
Dept: PERIOP | Facility: AMBULARY SURGERY CENTER | Age: 69
End: 2019-01-15
Payer: COMMERCIAL

## 2019-01-15 VITALS
SYSTOLIC BLOOD PRESSURE: 127 MMHG | HEART RATE: 71 BPM | HEIGHT: 68 IN | OXYGEN SATURATION: 99 % | DIASTOLIC BLOOD PRESSURE: 75 MMHG | RESPIRATION RATE: 18 BRPM | TEMPERATURE: 97.2 F | BODY MASS INDEX: 24.55 KG/M2 | WEIGHT: 162 LBS

## 2019-01-15 DIAGNOSIS — Z12.11 COLON CANCER SCREENING: ICD-10-CM

## 2019-01-15 DIAGNOSIS — Z83.71 FAMILY HISTORY OF COLONIC POLYPS: ICD-10-CM

## 2019-01-15 PROBLEM — Z83.719 FAMILY HISTORY OF COLONIC POLYPS: Status: RESOLVED | Noted: 2018-12-26 | Resolved: 2019-01-15

## 2019-01-15 PROCEDURE — 45380 COLONOSCOPY AND BIOPSY: CPT | Performed by: INTERNAL MEDICINE

## 2019-01-15 PROCEDURE — 88305 TISSUE EXAM BY PATHOLOGIST: CPT | Performed by: PATHOLOGY

## 2019-01-15 PROCEDURE — 45385 COLONOSCOPY W/LESION REMOVAL: CPT | Performed by: INTERNAL MEDICINE

## 2019-01-15 RX ORDER — PROPOFOL 10 MG/ML
INJECTION, EMULSION INTRAVENOUS CONTINUOUS PRN
Status: DISCONTINUED | OUTPATIENT
Start: 2019-01-15 | End: 2019-01-15 | Stop reason: SURG

## 2019-01-15 RX ORDER — PROPOFOL 10 MG/ML
INJECTION, EMULSION INTRAVENOUS AS NEEDED
Status: DISCONTINUED | OUTPATIENT
Start: 2019-01-15 | End: 2019-01-15 | Stop reason: SURG

## 2019-01-15 RX ORDER — SODIUM CHLORIDE 9 MG/ML
75 INJECTION, SOLUTION INTRAVENOUS CONTINUOUS
Status: DISCONTINUED | OUTPATIENT
Start: 2019-01-15 | End: 2019-01-15 | Stop reason: HOSPADM

## 2019-01-15 RX ORDER — LIDOCAINE HYDROCHLORIDE 10 MG/ML
INJECTION, SOLUTION INFILTRATION; PERINEURAL AS NEEDED
Status: DISCONTINUED | OUTPATIENT
Start: 2019-01-15 | End: 2019-01-15 | Stop reason: SURG

## 2019-01-15 RX ADMIN — LIDOCAINE HYDROCHLORIDE ANHYDROUS 50 MG: 10 INJECTION, SOLUTION INFILTRATION at 10:41

## 2019-01-15 RX ADMIN — PROPOFOL 50 MG: 10 INJECTION, EMULSION INTRAVENOUS at 10:45

## 2019-01-15 RX ADMIN — PROPOFOL 140 MCG/KG/MIN: 10 INJECTION, EMULSION INTRAVENOUS at 10:41

## 2019-01-15 RX ADMIN — PROPOFOL 50 MG: 10 INJECTION, EMULSION INTRAVENOUS at 10:50

## 2019-01-15 RX ADMIN — PROPOFOL 100 MG: 10 INJECTION, EMULSION INTRAVENOUS at 10:41

## 2019-01-15 RX ADMIN — SODIUM CHLORIDE: 0.9 INJECTION, SOLUTION INTRAVENOUS at 09:57

## 2019-01-15 NOTE — DISCHARGE INSTRUCTIONS
Discharge home  Resume regular diet  Resume home medications  Follow up biopsy results  Repeat colonoscopy in 5 years, or based on pathology  Call with any abdominal pain, bleeding, fevers

## 2019-01-15 NOTE — OP NOTE
Colonoscopy Procedure Note    Procedure: Colonoscopy    Sedation: Monitored anesthesia care, check anesthesia records      ASA Class: 2    INDICATIONS:  History of colon polyps, last colonoscopy 5 years ago    POST-OP DIAGNOSIS: See the impression below    Procedure Details     Prior colonoscopy: 5 years ago  Informed consent was obtained for the procedure, including sedation  Risks of perforation, hemorrhage, adverse drug reaction and aspiration were discussed  The patient was placed in the left lateral decubitus position  Based on the pre-procedure assessment, including review of the patient's medical history, medications, allergies, and review of systems, he had been deemed to be an appropriate candidate for conscious sedation; he was therefore sedated with the medications listed below  The patient was monitored continuously with telemetry, pulse oximetry, blood pressure monitoring, and direct observations  A rectal examination was performed  The colonoscope was inserted into the rectum and advanced under direct vision to the cecum, which was identified by the ileocecal valve and appendiceal orifice  The quality of the colonic preparation was good  A careful inspection was made as the colonoscope was withdrawn, including a retroflexed view of the rectum; findings and interventions are described below  Findings:    One 5 mm transverse colon polyp identified, removed by cold snare  Diminutive sigmoid polyp removed by cold biopsy forceps  Mild internal hemorrhoids on retroflexion  Otherwise normal colonoscopy with good prep good visualization  Endo cuff was used           Complications: None; patient tolerated the procedure well      Impression:    Two small polyps removed as outlined above with cold snare and cold biopsy  Internal hemorrhoids    Recommendations:    Discharge home  Resume regular diet  Resume home medications  Follow up biopsy results  Repeat colonoscopy in 5 years, or based on pathology  Call with any abdominal pain, bleeding, fevers    COMPLICATIONS:  None; patient tolerated the procedure well      SPECIMENS:    ID Type Source Tests Collected by Time Destination   1 : Cold snare polypectomy transverse Tissue Polyp, Colorectal TISSUE EXAM Rosana Hendrickson MD 1/15/2019 1052    2 : Bx Sigmoid polyp Tissue Polyp, Colorectal TISSUE EXAM Rosana Hendrickson MD 1/15/2019 1057        ESTIMATED BLOOD LOSS:  Minimal

## 2019-01-15 NOTE — H&P
History and Physical -  [facility]  Monty Montez 77 y o  male MRN: [id number]    HPI: Monty Montez is a 77y o  year old male who presents for hx of colon polyps, last colonoscopy 5 years ago  Review of Systems    Historical Information   Past Medical History:   Diagnosis Date    Hyperlipidemia     Hypertension      Past Surgical History:   Procedure Laterality Date    BACK SURGERY      HAND SURGERY      Incision Tendon Sheath Of a Finger    SINUS SURGERY      TONSILLECTOMY AND ADENOIDECTOMY       Social History   History   Alcohol Use    0 6 oz/week    1 Cans of beer per week     Comment: social      History   Drug Use No     History   Smoking Status    Former Smoker   Smokeless Tobacco    Never Used     Family History   Problem Relation Age of Onset    Heart disease Mother     Colon cancer Father        Meds/Allergies     Prescriptions Prior to Admission   Medication    amLODIPine (NORVASC) 5 mg tablet    Ascorbic Acid (VITAMIN C) 500 MG CAPS    famotidine (PEPCID) 20 mg tablet    metoprolol succinate (TOPROL-XL) 50 mg 24 hr tablet    Multiple Vitamins-Minerals (MULTIVITAMIN ADULTS 50+ PO)    simvastatin (ZOCOR) 20 mg tablet       No Known Allergies    Objective     Blood pressure (!) 187/83, pulse 96, temperature (!) 97 3 °F (36 3 °C), temperature source Temporal, resp  rate 18, height 5' 8" (1 727 m), weight 73 5 kg (162 lb), SpO2 100 %  PHYSICAL EXAM    Gen: NAD  CV: RRR  CHEST: Clear  ABD: soft, NT/ND  EXT: no edema  Neuro: AAO      ASSESSMENT/PLAN:  This is a 77y o  year old male here for hx of colon polyps, last colonoscopy 5 years ago         PLAN:   Procedure: colonoscopy

## 2019-01-15 NOTE — ANESTHESIA PREPROCEDURE EVALUATION
Review of Systems/Medical History  Patient summary reviewed  Chart reviewed      Cardiovascular  Exercise tolerance (METS): >4,  Hyperlipidemia, Hypertension , No past MI , No CAD , No angina ,    Pulmonary  Negative pulmonary ROS Smoker ex-smoker  , No shortness of breath, No recent URI , No sleep apnea ,        GI/Hepatic    GERD , Bowel prep       Negative  ROS        Endo/Other  Negative endo/other ROS      GYN       Hematology  Negative hematology ROS      Musculoskeletal  Negative musculoskeletal ROS        Neurology  Negative neurology ROS      Psychology   Negative psychology ROS              Physical Exam    Airway    Mallampati score: II  TM Distance: >3 FB  Neck ROM: full     Dental   No notable dental hx     Cardiovascular  Rhythm: regular, Rate: normal,     Pulmonary  Pulmonary exam normal     Other Findings        Anesthesia Plan  ASA Score- 2     Anesthesia Type- IV sedation with anesthesia with ASA Monitors  Additional Monitors:   Airway Plan:         Plan Factors-    Induction- intravenous  Postoperative Plan-     Informed Consent- Anesthetic plan and risks discussed with patient  I personally reviewed this patient with the CRNA  Discussed and agreed on the Anesthesia Plan with the CRNA  Quincy Maharaj

## 2019-01-15 NOTE — DISCHARGE INSTR - AVS FIRST PAGE
Colonoscopy Procedure Note    Procedure: Colonoscopy    Sedation: Monitored anesthesia care, check anesthesia records      ASA Class: 2    INDICATIONS:  History of colon polyps, last colonoscopy 5 years ago    POST-OP DIAGNOSIS: See the impression below    Procedure Details     Prior colonoscopy: 5 years ago  Informed consent was obtained for the procedure, including sedation  Risks of perforation, hemorrhage, adverse drug reaction and aspiration were discussed  The patient was placed in the left lateral decubitus position  Based on the pre-procedure assessment, including review of the patient's medical history, medications, allergies, and review of systems, he had been deemed to be an appropriate candidate for conscious sedation; he was therefore sedated with the medications listed below  The patient was monitored continuously with telemetry, pulse oximetry, blood pressure monitoring, and direct observations  A rectal examination was performed  The colonoscope was inserted into the rectum and advanced under direct vision to the cecum, which was identified by the ileocecal valve and appendiceal orifice  The quality of the colonic preparation was good  A careful inspection was made as the colonoscope was withdrawn, including a retroflexed view of the rectum; findings and interventions are described below  Findings:    One 5 mm transverse colon polyp identified, removed by cold snare  Diminutive sigmoid polyp removed by cold biopsy forceps  Mild internal hemorrhoids on retroflexion  Otherwise normal colonoscopy with good prep good visualization  Endo cuff was used           Complications: None; patient tolerated the procedure well      Impression:    Two small polyps removed as outlined above with cold snare and cold biopsy  Internal hemorrhoids    Recommendations:    Discharge home  Resume regular diet  Resume home medications  Follow up biopsy results  Repeat colonoscopy in 5 years, or based on pathology  Call with any abdominal pain, bleeding, fevers    COMPLICATIONS:  None; patient tolerated the procedure well      SPECIMENS:    ID Type Source Tests Collected by Time Destination   1 : Cold snare polypectomy transverse Tissue Polyp, Colorectal TISSUE EXAM Marlene De Oliveira MD 1/15/2019 1052    2 : Bx Sigmoid polyp Tissue Polyp, Colorectal TISSUE EXAM Marlene De Oliveira MD 1/15/2019 1057        ESTIMATED BLOOD LOSS:  Minimal

## 2019-01-15 NOTE — ANESTHESIA POSTPROCEDURE EVALUATION
Post-Op Assessment Note      CV Status:  Stable    Mental Status:  Lethargic    Hydration Status:  Stable    PONV Controlled:  None    Airway Patency:  Patent    Post Op Vitals Reviewed: Yes          Staff: CRNA           BP  118/70   Temp     Pulse  85   Resp   18   SpO2   99%RA

## 2019-01-18 DIAGNOSIS — I10 ESSENTIAL HYPERTENSION: Primary | ICD-10-CM

## 2019-01-18 RX ORDER — METOPROLOL SUCCINATE 50 MG/1
TABLET, EXTENDED RELEASE ORAL
Qty: 90 TABLET | Refills: 3 | Status: SHIPPED | OUTPATIENT
Start: 2019-01-18 | End: 2019-10-16 | Stop reason: SDUPTHER

## 2019-03-07 LAB — PSA SERPL-MCNC: 1.3 NG/ML

## 2019-03-25 ENCOUNTER — OFFICE VISIT (OUTPATIENT)
Dept: UROLOGY | Facility: AMBULATORY SURGERY CENTER | Age: 69
End: 2019-03-25
Payer: COMMERCIAL

## 2019-03-25 VITALS
HEIGHT: 68 IN | SYSTOLIC BLOOD PRESSURE: 128 MMHG | WEIGHT: 168 LBS | DIASTOLIC BLOOD PRESSURE: 80 MMHG | BODY MASS INDEX: 25.46 KG/M2 | HEART RATE: 76 BPM

## 2019-03-25 DIAGNOSIS — Z12.5 SCREENING FOR PROSTATE CANCER: Primary | ICD-10-CM

## 2019-03-25 PROCEDURE — 99213 OFFICE O/P EST LOW 20 MIN: CPT | Performed by: UROLOGY

## 2019-03-25 NOTE — PROGRESS NOTES
3/25/2019    Jake Peace  1950  [id number]        Assessment  Prostate cancer screening    Plan  We discussed his examination  As long as PSA remains stable as it has, do not see indication to proceed with biopsy  He would like to continue evaluation due to this finding, despite his age  Due to his excellent health, I think this is reasonable  Will continue with prostate examination and PSA annually for now  History of Present Illness  Med Feldman is a 68 y o  male presenting for annual prostate screening  He has had mild urinary symptoms intermittently  He went to a talk about uro lift procedure, but soon afterwards, his symptoms completely resolved  He has no current complaints  PSA remains stable at 1 3 within his normal range  Previously was found to have firmness on the right side of his prostate  AUA SYMPTOM SCORE      Most Recent Value   AUA SYMPTOM SCORE   How often have you had a sensation of not emptying your bladder completely after you finished urinating? 0   How often have you had to urinate again less than two hours after you finished urinating? 0   How often have you found you stopped and started again several times when you urinate?  0   How often have you found it difficult to postpone urination? 0   How often have you had a weak urinary stream?  1   How often have you had to push or strain to begin urination? 0   How many times did you most typically get up to urinate from the time you went to bed at night until the time you got up in the morning? 1   Quality of Life: If you were to spend the rest of your life with your urinary condition just the way it is now, how would you feel about that?  2   AUA SYMPTOM SCORE  2          Review of Systems  Review of Systems   Constitutional: Negative  HENT: Negative  Respiratory: Negative  Cardiovascular: Negative  Gastrointestinal: Negative  Genitourinary:        As per HPI   Musculoskeletal: Negative      Skin: Negative  Neurological: Negative  Hematological: Negative  Past Medical History  Past Medical History:   Diagnosis Date    Hyperlipidemia     Hypertension        Past Social History  Past Surgical History:   Procedure Laterality Date    BACK SURGERY      HAND SURGERY      Incision Tendon Sheath Of a Finger    [place] COLONOSCOPY FLX DX W/COLLJ SPEC WHEN PFRMD N/A 1/15/2019    Procedure: COLONOSCOPY;  Surgeon: Umesh Nguyễn MD;  [facility]; Service: Gastroenterology    SINUS SURGERY      TONSILLECTOMY AND ADENOIDECTOMY         Past Family History  Family History   Problem Relation Age of Onset    Heart disease Mother     Colon cancer Father        Past Social history  Social History     Socioeconomic History    Marital status: /Civil Union     Spouse name: Not on file    Number of children: Not on file    Years of education: Not on file    Highest education level: Not on file   Occupational History    Not on file   Social Needs    Financial resource strain: Not on file    Food insecurity:     Worry: Not on file     Inability: Not on file    Transportation needs:     Medical: Not on file     Non-medical: Not on file   Tobacco Use    Smoking status: Former Smoker    Smokeless tobacco: Never Used   Substance and Sexual Activity    Alcohol use:  Yes     Alcohol/week: 0 6 oz     Types: 1 Cans of beer per week     Comment: social     Drug use: No    Sexual activity: Not on file   Lifestyle    Physical activity:     Days per week: Not on file     Minutes per session: Not on file    Stress: Not on file   Relationships    Social connections:     Talks on phone: Not on file     Gets together: Not on file     Attends Worship service: Not on file     Active member of club or organization: Not on file     Attends meetings of clubs or organizations: Not on file     Relationship status: Not on file    Intimate partner violence:     Fear of current or ex partner: Not on file Emotionally abused: Not on file     Physically abused: Not on file     Forced sexual activity: Not on file   Other Topics Concern    Not on file   Social History Narrative    Advance directive on file, active         Social History     Tobacco Use   Smoking Status Former Smoker   Smokeless Tobacco Never Used       Current Medications  Current Outpatient Medications   Medication Sig Dispense Refill    amLODIPine (NORVASC) 5 mg tablet TAKE 1 TABLET EVERY DAY 90 tablet 3    Ascorbic Acid (VITAMIN C) 500 MG CAPS Take 1 capsule by mouth daily      famotidine (PEPCID) 20 mg tablet Take 1 tablet (20 mg total) by mouth daily 90 tablet 1    metoprolol succinate (TOPROL-XL) 50 mg 24 hr tablet TAKE 1 TABLET EVERY DAY 90 tablet 3    Multiple Vitamins-Minerals (MULTIVITAMIN ADULTS 50+ PO) Take 1 tablet by mouth daily      simvastatin (ZOCOR) 20 mg tablet TAKE 1 TABLET EVERY DAY 90 tablet 2     No current facility-administered medications for this visit  Allergies  No Known Allergies    Past Medical History, Social History, Family History, medications and allergies were reviewed  Vitals  Vitals:    03/25/19 0843   BP: 128/80   BP Location: Left arm   Patient Position: Sitting   Cuff Size: Adult   Pulse: 76   Weight: 76 2 kg (168 lb)   Height: 5' 8" (1 727 m)       Physical Exam  Physical Exam   Constitutional: He is oriented to person, place, and time  He appears well-developed and well-nourished  Cardiovascular: Normal rate  Pulmonary/Chest: Effort normal    Abdominal: Soft  Genitourinary:   Genitourinary Comments: Prostate about 30 g, firm on the right side, unchanged from previous  Musculoskeletal: Normal range of motion  Neurological: He is alert and oriented to person, place, and time  Skin: Skin is warm, dry and intact  Psychiatric: He has a normal mood and affect  Vitals reviewed          Results  Lab Results   Component Value Date    PSA 1 3 03/06/2019     Lab Results   Component Value Date    CALCIUM 9 9 09/20/2018     09/21/2017    K 4 2 09/20/2018    CO2 29 09/20/2018     09/20/2018    BUN 21 09/20/2018    CREATININE 1 11 09/21/2017     No results found for: WBC, HGB, HCT, MCV, PLT

## 2019-04-29 DIAGNOSIS — K21.9 GASTROESOPHAGEAL REFLUX DISEASE WITHOUT ESOPHAGITIS: ICD-10-CM

## 2019-04-29 RX ORDER — FAMOTIDINE 20 MG/1
20 TABLET, FILM COATED ORAL DAILY
Qty: 90 TABLET | Refills: 1 | Status: SHIPPED | OUTPATIENT
Start: 2019-04-29 | End: 2019-09-20 | Stop reason: SDUPTHER

## 2019-06-19 DIAGNOSIS — E78.5 HYPERLIPIDEMIA, UNSPECIFIED HYPERLIPIDEMIA TYPE: ICD-10-CM

## 2019-06-19 RX ORDER — SIMVASTATIN 20 MG
TABLET ORAL
Qty: 90 TABLET | Refills: 2 | Status: SHIPPED | OUTPATIENT
Start: 2019-06-19 | End: 2020-03-05 | Stop reason: SDUPTHER

## 2019-09-19 ENCOUNTER — OFFICE VISIT (OUTPATIENT)
Dept: INTERNAL MEDICINE CLINIC | Facility: CLINIC | Age: 69
End: 2019-09-19
Payer: COMMERCIAL

## 2019-09-19 VITALS
BODY MASS INDEX: 25.55 KG/M2 | WEIGHT: 168.6 LBS | HEART RATE: 63 BPM | OXYGEN SATURATION: 98 % | DIASTOLIC BLOOD PRESSURE: 82 MMHG | SYSTOLIC BLOOD PRESSURE: 128 MMHG | HEIGHT: 68 IN | RESPIRATION RATE: 16 BRPM

## 2019-09-19 DIAGNOSIS — E78.5 HYPERLIPIDEMIA, UNSPECIFIED HYPERLIPIDEMIA TYPE: Primary | ICD-10-CM

## 2019-09-19 DIAGNOSIS — Z00.00 MEDICARE ANNUAL WELLNESS VISIT, SUBSEQUENT: ICD-10-CM

## 2019-09-19 DIAGNOSIS — M79.645 THUMB PAIN, LEFT: ICD-10-CM

## 2019-09-19 DIAGNOSIS — M75.81 ROTATOR CUFF TENDINITIS, RIGHT: ICD-10-CM

## 2019-09-19 DIAGNOSIS — Z13.1 SCREENING FOR DIABETES MELLITUS: ICD-10-CM

## 2019-09-19 PROCEDURE — 99214 OFFICE O/P EST MOD 30 MIN: CPT | Performed by: INTERNAL MEDICINE

## 2019-09-19 PROCEDURE — 1170F FXNL STATUS ASSESSED: CPT | Performed by: INTERNAL MEDICINE

## 2019-09-19 PROCEDURE — G0439 PPPS, SUBSEQ VISIT: HCPCS | Performed by: INTERNAL MEDICINE

## 2019-09-19 PROCEDURE — 1125F AMNT PAIN NOTED PAIN PRSNT: CPT | Performed by: INTERNAL MEDICINE

## 2019-09-19 PROCEDURE — 1101F PT FALLS ASSESS-DOCD LE1/YR: CPT | Performed by: INTERNAL MEDICINE

## 2019-09-19 PROCEDURE — 3008F BODY MASS INDEX DOCD: CPT | Performed by: INTERNAL MEDICINE

## 2019-09-19 NOTE — PROGRESS NOTES
Assessment and Plan:     Problem List Items Addressed This Visit        Other    Medicare annual wellness visit, subsequent     Assessment and plan 1  Medicare subsequent annual wellness examination overall the patient is clinically stable and doing well, we encouraged the patient to follow a healthy and balanced diet  We recommend that the patient exercise routinely approximately 30 minutes 5 times per week   We have reviewed the patient's vaccines and have made recommendations for updates if necessary  counseled the patient to consider the flu vaccine      We will be ordering screening laboratories which are age appropriate  Return to the office in  6 months   call if any problems  Other Visit Diagnoses     Hyperlipidemia, unspecified hyperlipidemia type    -  Primary    Relevant Orders    Comprehensive metabolic panel    Lipid Panel with Direct LDL reflex    Screening for diabetes mellitus        Relevant Orders    Hemoglobin A1C    Rotator cuff tendinitis, right        Relevant Orders    Ambulatory referral to Orthopedic Surgery    Thumb pain, left        Relevant Orders    Ambulatory referral to Orthopedic Surgery           Preventive health issues were discussed with patient, and age appropriate screening tests were ordered as noted in patient's After Visit Summary  Personalized health advice and appropriate referrals for health education or preventive services given if needed, as noted in patient's After Visit Summary       History of Present Illness:     Patient presents for Medicare Annual Wellness visit    Patient Care Team:  Monica Quezada DO as PCP - MD Santy Marsh MD as Endoscopist     Problem List:     Patient Active Problem List   Diagnosis    Medicare annual wellness visit, subsequent    Gastroesophageal reflux disease without esophagitis    Elevated blood sugar    Screening for colon cancer    Crepitus of right shoulder joint    Pain of left thumb      Past Medical and Surgical History:     Past Medical History:   Diagnosis Date    Hyperlipidemia     Hypertension      Past Surgical History:   Procedure Laterality Date    BACK SURGERY      HAND SURGERY      Incision Tendon Sheath Of a Finger    [place] COLONOSCOPY FLX DX W/COLLJ SPEC WHEN PFRMD N/A 1/15/2019    Procedure: COLONOSCOPY;  Surgeon: Lakeisha Richards MD;  [facility]; Service: Gastroenterology    SINUS SURGERY      TONSILLECTOMY AND ADENOIDECTOMY        Family History:     Family History   Problem Relation Age of Onset    Heart disease Mother     Colon cancer Father       Social History:     Social History     Socioeconomic History    Marital status: /Civil Union     Spouse name: None    Number of children: None    Years of education: None    Highest education level: None   Occupational History    None   Social Needs    Financial resource strain: None    Food insecurity:     Worry: None     Inability: None    Transportation needs:     Medical: None     Non-medical: None   Tobacco Use    Smoking status: Former Smoker    Smokeless tobacco: Never Used   Substance and Sexual Activity    Alcohol use:  Yes     Alcohol/week: 1 0 standard drinks     Types: 1 Cans of beer per week     Comment: social     Drug use: No    Sexual activity: None   Lifestyle    Physical activity:     Days per week: None     Minutes per session: None    Stress: None   Relationships    Social connections:     Talks on phone: None     Gets together: None     Attends Latter-day service: None     Active member of club or organization: None     Attends meetings of clubs or organizations: None     Relationship status: None    Intimate partner violence:     Fear of current or ex partner: None     Emotionally abused: None     Physically abused: None     Forced sexual activity: None   Other Topics Concern    None   Social History Narrative    Advance directive on file, active           Medications and Allergies:     Current Outpatient Medications   Medication Sig Dispense Refill    amLODIPine (NORVASC) 5 mg tablet TAKE 1 TABLET EVERY DAY 90 tablet 3    Ascorbic Acid (VITAMIN C) 500 MG CAPS Take 1 capsule by mouth daily      famotidine (PEPCID) 20 mg tablet TAKE 1 TABLET (20 MG TOTAL) BY MOUTH DAILY 90 tablet 1    metoprolol succinate (TOPROL-XL) 50 mg 24 hr tablet TAKE 1 TABLET EVERY DAY 90 tablet 3    Multiple Vitamins-Minerals (MULTIVITAMIN ADULTS 50+ PO) Take 1 tablet by mouth daily      simvastatin (ZOCOR) 20 mg tablet TAKE 1 TABLET EVERY DAY 90 tablet 2     No current facility-administered medications for this visit  No Known Allergies   Immunizations:     Immunization History   Administered Date(s) Administered    Influenza Split High Dose Preservative Free IM 09/14/2016    Influenza TIV (IM) 10/09/2013    Influenza, high dose seasonal 0 5 mL 09/17/2018    Pneumococcal Conjugate 13-Valent 08/24/2015    Pneumococcal Polysaccharide PPV23 12/09/2013    Tdap 02/24/2011    Zoster 01/06/2014    Zoster Vaccine Recombinant 03/05/2019, 06/14/2019    influenza, trivalent, adjuvanted 09/16/2019      Health Maintenance:         Topic Date Due    CRC Screening: Colonoscopy  01/15/2024    Hepatitis C Screening  Completed     There are no preventive care reminders to display for this patient  Medicare Health Risk Assessment:     /82   Pulse 63   Resp 16   Ht 5' 8" (1 727 m)   Wt 76 5 kg (168 lb 9 6 oz)   SpO2 98%   BMI 25 64 kg/m²      Dari Lira is here for his Subsequent Wellness visit  Health Risk Assessment:   Patient rates overall health as good  Patient feels that their physical health rating is same  Eyesight was rated as same  Hearing was rated as same  Patient feels that their emotional and mental health rating is same  Pain experienced in the last 7 days has been none   Patient states that he has experienced no weight loss or gain in last 6 months  Depression Screening:   PHQ-2 Score: 0      Fall Risk Screening: In the past year, patient has experienced: no history of falling in past year      Home Safety:  Patient does not have trouble with stairs inside or outside of their home  Patient has working smoke alarms and has working carbon monoxide detector  Home safety hazards include: none  Nutrition:   Current diet is Regular  Salad every night    Medications:   Patient is currently taking over-the-counter supplements  OTC medications include: see medication list  Patient is able to manage medications  Activities of Daily Living (ADLs)/Instrumental Activities of Daily Living (IADLs):   Walk and transfer into and out of bed and chair?: Yes  Dress and groom yourself?: Yes    Bathe or shower yourself?: Yes    Feed yourself? Yes  Do your laundry/housekeeping?: Yes  Manage your money, pay your bills and track your expenses?: Yes  Make your own meals?: Yes    Do your own shopping?: Yes    Previous Hospitalizations:   Any hospitalizations or ED visits within the last 12 months?: No      Advance Care Planning:   Living will: Yes    Durable POA for healthcare:  Yes    Advanced directive: Yes      Cognitive Screening:   Provider or family/friend/caregiver concerned regarding cognition?: No    PREVENTIVE SCREENINGS      Cardiovascular Screening:    General: Screening Not Indicated and History Lipid Disorder      Diabetes Screening:     General: Screening Current      Colorectal Cancer Screening:     General: Screening Current      Prostate Cancer Screening:    General: Screening Current      Abdominal Aortic Aneurysm (AAA) Screening:    Risk factors include: age between 73-69 yo and tobacco use        Lung Cancer Screening:     General: Screening Not Indicated      Hepatitis C Screening:    General: Screening Current      Serenity Boateng DO

## 2019-09-19 NOTE — PATIENT INSTRUCTIONS

## 2019-09-20 DIAGNOSIS — K21.9 GASTROESOPHAGEAL REFLUX DISEASE WITHOUT ESOPHAGITIS: ICD-10-CM

## 2019-09-20 RX ORDER — FAMOTIDINE 20 MG/1
20 TABLET, FILM COATED ORAL DAILY
Qty: 90 TABLET | Refills: 1 | Status: SHIPPED | OUTPATIENT
Start: 2019-09-20 | End: 2020-03-05 | Stop reason: SDUPTHER

## 2019-09-22 PROBLEM — I10 HYPERTENSION: Status: ACTIVE | Noted: 2019-09-22

## 2019-09-22 PROBLEM — M75.81 ROTATOR CUFF TENDINITIS, RIGHT: Status: ACTIVE | Noted: 2019-09-22

## 2019-09-22 PROBLEM — E78.5 HYPERLIPIDEMIA: Status: ACTIVE | Noted: 2019-09-22

## 2019-09-22 PROBLEM — M79.645 THUMB PAIN, LEFT: Status: ACTIVE | Noted: 2019-09-22

## 2019-09-22 NOTE — ASSESSMENT & PLAN NOTE
I will have the patient see orthopedics Dr Pauline Davenport he will likely need a steroid injection recommend range-of-motion exercises

## 2019-09-22 NOTE — ASSESSMENT & PLAN NOTE
Assessment and plan 1  Medicare subsequent annual wellness examination overall the patient is clinically stable and doing well, we encouraged the patient to follow a healthy and balanced diet  We recommend that the patient exercise routinely approximately 30 minutes 5 times per week   We have reviewed the patient's vaccines and have made recommendations for updates if necessary  counseled the patient to consider the flu vaccine      We will be ordering screening laboratories which are age appropriate  Return to the office in  6 months   call if any problems

## 2019-09-22 NOTE — PROGRESS NOTES
BMI Counseling: Body mass index is 25 64 kg/m²  Discussed the patient's BMI with him  The BMI is above normal  Nutrition recommendations include 3-5 servings of fruits/vegetables daily  Assessment/Plan:    Medicare annual wellness visit, subsequent  Assessment and plan 1  Medicare subsequent annual wellness examination overall the patient is clinically stable and doing well, we encouraged the patient to follow a healthy and balanced diet  We recommend that the patient exercise routinely approximately 30 minutes 5 times per week   We have reviewed the patient's vaccines and have made recommendations for updates if necessary  counseled the patient to consider the flu vaccine      We will be ordering screening laboratories which are age appropriate  Return to the office in  6 months   call if any problems  Rotator cuff tendinitis, right  I will have the patient see orthopedics Dr Michael Romero he will likely need a steroid injection recommend range-of-motion exercises  Hyperlipidemia  Controlled doing well will check a lipid profile    Thumb pain, left  Suspect strain will have the patient see orthopedic hand specialist Dr Dr Nenita Hernández    Hypertension  Hypertension - controlled, I have counseled patient following healthy balance diet, I would like the patient reduce sodium, exercise routinely, I would like the patient continued the med current medical regiment and we will continue to monitor  Problem List Items Addressed This Visit        Musculoskeletal and Integument    Rotator cuff tendinitis, right     I will have the patient see orthopedics Dr Michael Romero he will likely need a steroid injection recommend range-of-motion exercises  Relevant Orders    Ambulatory referral to Orthopedic Surgery       Other    Medicare annual wellness visit, subsequent     Assessment and plan 1    Medicare subsequent annual wellness examination overall the patient is clinically stable and doing well, we encouraged the patient to follow a healthy and balanced diet  We recommend that the patient exercise routinely approximately 30 minutes 5 times per week   We have reviewed the patient's vaccines and have made recommendations for updates if necessary  counseled the patient to consider the flu vaccine      We will be ordering screening laboratories which are age appropriate  Return to the office in  6 months   call if any problems  Hyperlipidemia - Primary     Controlled doing well will check a lipid profile         Relevant Orders    Comprehensive metabolic panel    Lipid Panel with Direct LDL reflex    Thumb pain, left     Suspect strain will have the patient see orthopedic hand specialist Dr Dr Bianca Beckett referral to Orthopedic Surgery      Other Visit Diagnoses     Screening for diabetes mellitus        Relevant Orders    Hemoglobin A1C          Return to office 6  months  call if any problems  Subjective:      Patient ID: Topher Yusuf is a 68 y o  male  HPI 65-year old male coming in for a follow up visit regarding hypertension, hyperlipidemia, rotator cuff tendinitis, left thumb pain; The patient reports me compliant taking medications without untoward side effects the  The patient is here to review his medical condition, update me on the medical condition and the patient reports me no hospitalizations and no ER visits  He reports me chronic right shoulder pain with use, reports me chronic intermittent right thumb pain  He would like to see an orthopedic specialist   He is trying to follow healthy diet and remains active  The following portions of the patient's history were reviewed and updated as appropriate: allergies, current medications, past family history, past medical history, past social history, past surgical history and problem list     Review of Systems   Constitutional: Negative for activity change, appetite change and unexpected weight change     HENT: Negative for congestion and postnasal drip  Eyes: Negative for visual disturbance  Respiratory: Negative for cough and shortness of breath  Cardiovascular: Negative for chest pain  Gastrointestinal: Negative for abdominal pain, diarrhea, nausea and vomiting  Neurological: Negative for dizziness, light-headedness and headaches  Hematological: Negative for adenopathy  right shoulder pain, thumb pain    Objective:    No follow-ups on file  No results found  No Known Allergies    Past Medical History:   Diagnosis Date    Hyperlipidemia     Hypertension      Past Surgical History:   Procedure Laterality Date    BACK SURGERY      HAND SURGERY      Incision Tendon Sheath Of a Finger    [place] COLONOSCOPY FLX DX W/COLLJ SPEC WHEN PFRMD N/A 1/15/2019    Procedure: COLONOSCOPY;  Surgeon: Mag Cannon MD;  [facility]; Service: Gastroenterology    SINUS SURGERY      TONSILLECTOMY AND ADENOIDECTOMY       Current Outpatient Medications on File Prior to Visit   Medication Sig Dispense Refill    amLODIPine (NORVASC) 5 mg tablet TAKE 1 TABLET EVERY DAY 90 tablet 3    Ascorbic Acid (VITAMIN C) 500 MG CAPS Take 1 capsule by mouth daily      metoprolol succinate (TOPROL-XL) 50 mg 24 hr tablet TAKE 1 TABLET EVERY DAY 90 tablet 3    Multiple Vitamins-Minerals (MULTIVITAMIN ADULTS 50+ PO) Take 1 tablet by mouth daily      simvastatin (ZOCOR) 20 mg tablet TAKE 1 TABLET EVERY DAY 90 tablet 2     No current facility-administered medications on file prior to visit        Family History   Problem Relation Age of Onset    Heart disease Mother     Colon cancer Father      Social History     Socioeconomic History    Marital status: /Civil Union     Spouse name: Not on file    Number of children: Not on file    Years of education: Not on file    Highest education level: Not on file   Occupational History    Not on file   Social Needs    Financial resource strain: Not on file   [place] insecurity:     Worry: Not on file     Inability: Not on file    Transportation needs:     Medical: Not on file     Non-medical: Not on file   Tobacco Use    Smoking status: Former Smoker    Smokeless tobacco: Never Used   Substance and Sexual Activity    Alcohol use: Yes     Alcohol/week: 1 0 standard drinks     Types: 1 Cans of beer per week     Comment: social     Drug use: No    Sexual activity: Not on file   Lifestyle    Physical activity:     Days per week: Not on file     Minutes per session: Not on file    Stress: Not on file   Relationships    Social connections:     Talks on phone: Not on file     Gets together: Not on file     Attends Congregational service: Not on file     Active member of club or organization: Not on file     Attends meetings of clubs or organizations: Not on file     Relationship status: Not on file    Intimate partner violence:     Fear of current or ex partner: Not on file     Emotionally abused: Not on file     Physically abused: Not on file     Forced sexual activity: Not on file   Other Topics Concern    Not on file   Social History Narrative    Advance directive on file, active         Vitals:    09/19/19 0933   BP: 128/82   Pulse: 63   Resp: 16   SpO2: 98%   Weight: 76 5 kg (168 lb 9 6 oz)   Height: 5' 8" (1 727 m)     Results for orders placed or performed in visit on 03/06/19   PSA, Total Screen   Result Value Ref Range    Prostate Specific Antigen Total 1 3 < OR = 4 0 ng/mL     Weight (last 2 days)     None        Body mass index is 25 64 kg/m²  BP      Temp      Pulse     Resp      SpO2        Vitals:    09/19/19 0933   Weight: 76 5 kg (168 lb 9 6 oz)     Vitals:    09/19/19 0933   Weight: 76 5 kg (168 lb 9 6 oz)       /82   Pulse 63   Resp 16   Ht 5' 8" (1 727 m)   Wt 76 5 kg (168 lb 9 6 oz)   SpO2 98%   BMI 25 64 kg/m²          Physical Exam   Constitutional: He appears well-developed and well-nourished  No distress     HENT:   Head: Normocephalic and atraumatic  Right Ear: External ear normal    Left Ear: External ear normal    Mouth/Throat: Oropharynx is clear and moist    Eyes: Pupils are equal, round, and reactive to light  Conjunctivae are normal  Right eye exhibits no discharge  Left eye exhibits no discharge  No scleral icterus  Neck: Neck supple  Cardiovascular: Normal rate, regular rhythm and normal heart sounds  Exam reveals no gallop and no friction rub  No murmur heard  Pulmonary/Chest: No respiratory distress  He has no wheezes  He has no rales  Abdominal: Soft  Bowel sounds are normal  He exhibits no distension and no mass  There is no tenderness  There is no rebound and no guarding  Musculoskeletal: He exhibits no edema or deformity  Lymphadenopathy:     He has no cervical adenopathy  Neurological: He is alert  Skin: He is not diaphoretic  Psychiatric: He has a normal mood and affect       full range of motion of the right shoulder but he does have pain with internal external rotation also pain at the base of the right thumb tenosynovitis full range of motion

## 2019-09-22 NOTE — ASSESSMENT & PLAN NOTE
Suspect strain will have the patient see orthopedic hand specialist Dr Dr MURRAY [facility]

## 2019-10-01 LAB
ALBUMIN SERPL-MCNC: 4.4 G/DL (ref 3.6–5.1)
ALBUMIN/GLOB SERPL: 1.8 (CALC) (ref 1–2.5)
ALP SERPL-CCNC: 67 U/L (ref 40–115)
ALT SERPL-CCNC: 22 U/L (ref 9–46)
AST SERPL-CCNC: 24 U/L (ref 10–35)
BILIRUB SERPL-MCNC: 0.5 MG/DL (ref 0.2–1.2)
BUN SERPL-MCNC: 19 MG/DL (ref 7–25)
BUN/CREAT SERPL: ABNORMAL (CALC) (ref 6–22)
CALCIUM SERPL-MCNC: 9.9 MG/DL (ref 8.6–10.3)
CHLORIDE SERPL-SCNC: 101 MMOL/L (ref 98–110)
CHOLEST SERPL-MCNC: 214 MG/DL
CHOLEST/HDLC SERPL: 2.5 (CALC)
CO2 SERPL-SCNC: 30 MMOL/L (ref 20–32)
CREAT SERPL-MCNC: 1.03 MG/DL (ref 0.7–1.18)
GLOBULIN SER CALC-MCNC: 2.4 G/DL (CALC) (ref 1.9–3.7)
GLUCOSE SERPL-MCNC: 112 MG/DL (ref 65–99)
HBA1C MFR BLD: 5.3 % OF TOTAL HGB
HDLC SERPL-MCNC: 87 MG/DL
LDLC SERPL CALC-MCNC: 108 MG/DL (CALC)
NONHDLC SERPL-MCNC: 127 MG/DL (CALC)
POTASSIUM SERPL-SCNC: 4.3 MMOL/L (ref 3.5–5.3)
PROT SERPL-MCNC: 6.8 G/DL (ref 6.1–8.1)
SL AMB EGFR AFRICAN AMERICAN: 84 ML/MIN/1.73M2
SL AMB EGFR NON AFRICAN AMERICAN: 73 ML/MIN/1.73M2
SODIUM SERPL-SCNC: 139 MMOL/L (ref 135–146)
TRIGL SERPL-MCNC: 92 MG/DL

## 2019-10-07 ENCOUNTER — CONSULT (OUTPATIENT)
Dept: OBGYN CLINIC | Facility: HOSPITAL | Age: 69
End: 2019-10-07
Payer: COMMERCIAL

## 2019-10-07 VITALS
WEIGHT: 171.2 LBS | SYSTOLIC BLOOD PRESSURE: 192 MMHG | HEIGHT: 68 IN | BODY MASS INDEX: 25.94 KG/M2 | DIASTOLIC BLOOD PRESSURE: 79 MMHG | HEART RATE: 57 BPM

## 2019-10-07 DIAGNOSIS — M75.51 SUBACROMIAL BURSITIS OF RIGHT SHOULDER JOINT: Primary | ICD-10-CM

## 2019-10-07 PROCEDURE — 20610 DRAIN/INJ JOINT/BURSA W/O US: CPT | Performed by: ORTHOPAEDIC SURGERY

## 2019-10-07 PROCEDURE — 99203 OFFICE O/P NEW LOW 30 MIN: CPT | Performed by: ORTHOPAEDIC SURGERY

## 2019-10-07 RX ORDER — BUPIVACAINE HYDROCHLORIDE 2.5 MG/ML
2 INJECTION, SOLUTION INFILTRATION; PERINEURAL
Status: COMPLETED | OUTPATIENT
Start: 2019-10-07 | End: 2019-10-07

## 2019-10-07 RX ORDER — BETAMETHASONE SODIUM PHOSPHATE AND BETAMETHASONE ACETATE 3; 3 MG/ML; MG/ML
6 INJECTION, SUSPENSION INTRA-ARTICULAR; INTRALESIONAL; INTRAMUSCULAR; SOFT TISSUE
Status: COMPLETED | OUTPATIENT
Start: 2019-10-07 | End: 2019-10-07

## 2019-10-07 RX ADMIN — BETAMETHASONE SODIUM PHOSPHATE AND BETAMETHASONE ACETATE 6 MG: 3; 3 INJECTION, SUSPENSION INTRA-ARTICULAR; INTRALESIONAL; INTRAMUSCULAR; SOFT TISSUE at 10:25

## 2019-10-07 RX ADMIN — BUPIVACAINE HYDROCHLORIDE 2 ML: 2.5 INJECTION, SOLUTION INFILTRATION; PERINEURAL at 10:25

## 2019-10-07 NOTE — PATIENT INSTRUCTIONS

## 2019-10-07 NOTE — PROGRESS NOTES
Assessment  Diagnoses and all orders for this visit:    Subacromial bursitis of right shoulder joint        Discussion and Plan:    · The patient has an examination consistent with subacromial impingement syndrome of the right shoulder  I have discussed with the patient the pathophysiology of this diagnosis and reviewed how the examination correlates with this diagnosis  Treatment options were discussed at length and after discussing these treatment options, the patient elected for and received a subacromial injection of corticosteroid (as described in the procedure note) with a prescription for referral to physical therapy  We will reevaluate the patient in 6-8 weeks  If the symptoms fail to improve with this treatment the patient would be indicated for further imaging in the form of an MRI scan of the shoulder  Subjective:   Patient ID: Caitlyn Coombs is a 68 y o  male      The patient presents today for an Orthopedic consultation from his PCP (Dr Dilcia Diaz) on 9/19/19with a chief complaint of right shoulder pain  The pain began 1 5 year(s) ago and is not associated with an acute injury  The patient describes the pain as aching and dull in intensity,  intermittent in timing, and localizes the pain to the  right subacromial joint  The pain is worse with movement, overhead work and overuse and relieved by rest, ice, avoiding the painful activities  The pain is not associated with numbness and tingling  The pain is not associated with constitutional symptoms  The patient is not awoken at night by the pain  The patient has had no prior treatment  The following portions of the patient's history were reviewed and updated as appropriate: allergies, current medications, past family history, past medical history, past social history, past surgical history and problem list     Review of Systems   Constitutional: Negative for chills, fatigue, fever and unexpected weight change     HENT: Negative for hearing loss, nosebleeds and sore throat  Eyes: Negative for pain, redness and visual disturbance  Respiratory: Negative for cough, shortness of breath and wheezing  Cardiovascular: Negative for chest pain, palpitations and leg swelling  Gastrointestinal: Negative for abdominal pain, nausea and vomiting  Endocrine: Negative for polydipsia and polyuria  Genitourinary: Negative for frequency and urgency  Skin: Negative for color change, rash and wound  Neurological: Negative for dizziness, weakness, numbness and headaches  Psychiatric/Behavioral: Negative for behavioral problems, self-injury and suicidal ideas  Objective:  Right Shoulder Exam     Tenderness   The patient is experiencing no tenderness  Range of Motion   External rotation: 70   Forward flexion: 170   Internal rotation 0 degrees: Mid thoracic     Muscle Strength   Abduction: 5/5   External rotation: 5/5     Tests   Berry test: positive  Impingement: positive  Drop arm: negative    Other   Erythema: absent  Sensation: normal  Pulse: present              Physical Exam   Constitutional: He is oriented to person, place, and time  He appears well-developed and well-nourished  No distress  Eyes: Pupils are equal, round, and reactive to light  Conjunctivae are normal    Neck: Normal range of motion  Neck supple  Cardiovascular: Normal rate, regular rhythm and intact distal pulses  Pulmonary/Chest: Effort normal and breath sounds normal    Abdominal: Soft  Bowel sounds are normal    Neurological: He is alert and oriented to person, place, and time  He has normal reflexes  Skin: Skin is warm and dry  No rash noted  No erythema  Psychiatric: He has a normal mood and affect   His behavior is normal      Large joint arthrocentesis: R subacromial bursa  Date/Time: 10/7/2019 10:25 AM  Consent given by: patient  Site marked: site marked  Timeout: Immediately prior to procedure a time out was called to verify the correct patient, procedure, equipment, support staff and site/side marked as required   Supporting Documentation  Indications: pain and diagnostic evaluation   Procedure Details  Location: shoulder - R subacromial bursa  Preparation: Patient was prepped and draped in the usual sterile fashion  Needle size: 22 G  Ultrasound guidance: no  Approach: lateral  Medications administered: 2 mL bupivacaine 0 25 %; 6 mg betamethasone acetate-betamethasone sodium phosphate 6 (3-3) mg/mL    Patient tolerance: patient tolerated the procedure well with no immediate complications  Dressing:  Sterile dressing applied          I have personally reviewed pertinent films in PACS and my interpretation is as follows  X Ray Right Shoulder performed on 9/17/18: Mild AC joint osteoarthritis   No other acute osseous abnormality or degenerative changes about the glenohumeral joint    Scribe Attestation    I,:   Maxwell Dwyer am acting as a scribe while in the presence of the attending physician :        I,:   Sumit Dunaway MD personally performed the services described in this documentation    as scribed in my presence :

## 2019-10-10 ENCOUNTER — EVALUATION (OUTPATIENT)
Dept: PHYSICAL THERAPY | Facility: CLINIC | Age: 69
End: 2019-10-10
Payer: COMMERCIAL

## 2019-10-10 DIAGNOSIS — M75.51 SUBACROMIAL BURSITIS OF RIGHT SHOULDER JOINT: ICD-10-CM

## 2019-10-10 DIAGNOSIS — E78.5 HYPERLIPIDEMIA, UNSPECIFIED HYPERLIPIDEMIA TYPE: Primary | ICD-10-CM

## 2019-10-10 PROCEDURE — 97162 PT EVAL MOD COMPLEX 30 MIN: CPT | Performed by: PHYSICAL THERAPIST

## 2019-10-10 NOTE — PROGRESS NOTES
PT Evaluation     Today's date: 10/10/2019  Patient name: Mae Rangel  : 1950  MRN: [id number]  Referring provider: Warren Bryant MD  Dx:   Encounter Diagnosis     ICD-10-CM    1  Subacromial bursitis of right shoulder joint M75 51 Ambulatory referral to Physical Therapy       Start Time:   Stop Time: 915  Total time in clinic (min): 42 minutes    Assessment  Assessment details: Mae Rangel is a 68 y o  male who presents to the clinic with signs and symptoms consistent with a right shoulder bursitis and impingement  The patient notes most of his pain and discomfort comes when lifting and reaching overhead  The patient presents with the above listed impairments  He is eager to decrease his pain and should benefit from skilled physical therapy  Thank you for the referral       Impairments: abnormal muscle firing, abnormal or restricted ROM, activity intolerance, lacks appropriate home exercise program and pain with function  Understanding of Dx/Px/POC: good   Prognosis: good    Goals  Impairment Goals:  1  Patient will decrease complaints of pain by 50% at worst in 4 weeks  2  Patient will increase IR to lower thoracic BTB in 8 weeks    Functional Goals:  1  Patient will be independent with HEP by discharge  2  Patient will increase FOTO to average norms by discharge  3  Patient will be able to reach an overhead shelf with decreased complaints of pain in 4 weeks  4  Patient will tuck in his shirt with decreased pain in 8 weeks  5    Patient will return to prior level of function by discharge      Plan  Patient would benefit from: skilled physical therapy  Planned modality interventions: cryotherapy, TENS and thermotherapy: hydrocollator packs  Planned therapy interventions: flexibility, functional ROM exercises, graded activity, graded exercise, home exercise program, therapeutic exercise, therapeutic activities, stretching, strengthening, patient education, postural training, neuromuscular re-education, muscle pump exercises, motor coordination training, Rooney taping, massage, manual therapy and joint mobilization  Frequency: 2x week  Duration in weeks: 8  Treatment plan discussed with: patient        Subjective Evaluation    History of Present Illness  Mechanism of injury: HPI:  Patient reports that his right shoulder 18 months ago  He reports insidious onset of his pain, but reports that he has been able to do all of his functional activities but with pain  He mentioned to his PCP that his shoulder has been bothering him and he was seen by ortho  X-rays were (-) prior for any bony pathology  He was given a corticosteroid injection was referred to physical therapy  Pain Location:  Anterior/Superior right shoulder   Occupation:  Retired   Prior Functional Limitations: Independent prior   AGG:  Reaching above head, tucking shirt in, reaching behind back  Ease:  Alleve    Patient Goals:  "I just don't want it to hurt anymore"     Pain  Current pain ratin  At best pain ratin  At worst pain ratin  Quality: dull ache          Objective     Active Range of Motion   Left Shoulder   Flexion: 149 degrees   Internal rotation BTB: mid thoracic     Right Shoulder   Flexion: 147 degrees   Internal rotation BTB: lumbar     Passive Range of Motion     Right Shoulder   Flexion: 170 degrees   External rotation 0°: 68 degrees     Strength/Myotome Testing     Left Shoulder     Planes of Motion   Flexion: 5   Abduction: 5   External rotation at 0°: 5   Internal rotation at 0°: 5     Right Shoulder     Planes of Motion   Flexion: 5   Abduction: 5   External rotation at 0°: 5   Internal rotation at 0°: 5     Additional Strength Details  Pain with testing lower traps on right     Tests     Right Shoulder   Positive Hawkin's                 Diagnosis:    Precautions:    Manuals        PROM        Mobs                        Exercise Diary        UBE                SL ER        Bent over H-abd        Bent over [employer] over Lower trap        Bent over Lats        Supine Punch        Scap Stab with towel on wall                                                                                                        Modalities             CP PRN

## 2019-10-15 ENCOUNTER — OFFICE VISIT (OUTPATIENT)
Dept: PHYSICAL THERAPY | Facility: CLINIC | Age: 69
End: 2019-10-15
Payer: COMMERCIAL

## 2019-10-15 DIAGNOSIS — M75.51 SUBACROMIAL BURSITIS OF RIGHT SHOULDER JOINT: Primary | ICD-10-CM

## 2019-10-15 PROCEDURE — 97110 THERAPEUTIC EXERCISES: CPT | Performed by: PHYSICAL THERAPIST

## 2019-10-15 PROCEDURE — 97140 MANUAL THERAPY 1/> REGIONS: CPT | Performed by: PHYSICAL THERAPIST

## 2019-10-15 PROCEDURE — 97112 NEUROMUSCULAR REEDUCATION: CPT | Performed by: PHYSICAL THERAPIST

## 2019-10-15 NOTE — PROGRESS NOTES
Daily Note     Today's date: 10/15/2019  Patient name: Donta Sweet  : 1950  MRN: [id number]  Referring provider: Janeth Dexter MD  Dx:   Encounter Diagnosis     ICD-10-CM    1  Subacromial bursitis of right shoulder joint M75 51        Start Time: 919  Stop Time: 1000  Total time in clinic (min): 41 minutes    Subjective: "It pops and it hurts, but it goes away in a few seconds"       Objective: See treatment diary below      Assessment: Tolerated treatment well  Patient would benefit from continued PT  Introduced new PREs today which the patient completed with minimal complaints of pain  Patient able to demonstrate independence with exercises today and added to his HEP  Continue to focus on strength as able  Plan: Progress treatment as tolerated         Diagnosis:    Precautions:    Manuals 10/15       PROM Flexion       Mobs GH AP and Inf Grade III                        Exercise Diary        UBE                SL ER 2# 3x10       Bent over H-abd        Bent over Rows        Bent over Lower trap        Bent over Lats        Supine Punch 4# 3x10       Sleeper Stretch 10" x 10       TB ER/IR Green 2x10       TB Rows Green 2x10                                                                                       Modalities             CP PRN

## 2019-10-16 DIAGNOSIS — I10 ESSENTIAL HYPERTENSION: ICD-10-CM

## 2019-10-16 RX ORDER — METOPROLOL SUCCINATE 50 MG/1
TABLET, EXTENDED RELEASE ORAL
Qty: 90 TABLET | Refills: 3 | Status: SHIPPED | OUTPATIENT
Start: 2019-10-16 | End: 2020-03-05 | Stop reason: SDUPTHER

## 2019-10-16 RX ORDER — AMLODIPINE BESYLATE 5 MG/1
TABLET ORAL
Qty: 90 TABLET | Refills: 3 | Status: SHIPPED | OUTPATIENT
Start: 2019-10-16 | End: 2020-01-20 | Stop reason: SDUPTHER

## 2019-10-17 ENCOUNTER — OFFICE VISIT (OUTPATIENT)
Dept: PHYSICAL THERAPY | Facility: CLINIC | Age: 69
End: 2019-10-17
Payer: COMMERCIAL

## 2019-10-17 DIAGNOSIS — M75.51 SUBACROMIAL BURSITIS OF RIGHT SHOULDER JOINT: Primary | ICD-10-CM

## 2019-10-17 PROCEDURE — 97110 THERAPEUTIC EXERCISES: CPT

## 2019-10-17 PROCEDURE — 97140 MANUAL THERAPY 1/> REGIONS: CPT | Performed by: PHYSICAL THERAPIST

## 2019-10-17 PROCEDURE — 97112 NEUROMUSCULAR REEDUCATION: CPT

## 2019-10-17 NOTE — PROGRESS NOTES
Daily Note     Today's date: 10/17/2019  Patient name: Clay Villanueva  : 1950  MRN: [id number]  Referring provider: Nicol Veloz MD  Dx:   Encounter Diagnosis     ICD-10-CM    1  Subacromial bursitis of right shoulder joint M75 51                   Subjective: Patient reports, "I feel about the same  It still hurts when I try to go overhead"  Objective: See treatment diary below      Assessment: Tolerated treatment well  Patient exhibited good technique with therapeutic exercises and would benefit from continued PT  Patient needed cuing for form with SL ER; was able to self- correct  Consider 3# weight next session  Progress scapular strength and stability as tolerated  Continue to progress per tolerance  Plan: Continue per plan of care        Diagnosis:    Precautions:    Manuals 10/15 10/17       PROM Flexion       Mobs GH AP and Inf Grade III  GH AP and Inf Grade III - RT, PT                       Exercise Diary        UBE                SL ER 2# 3x10 4#, 2x10   (3#, NV)      Bent over H-abd        Bent over Rows        Bent over Lower trap        Bent over Lats        Supine Punch 4# 3x10 5#, 3x10       Sleeper Stretch 10" x 10 10" x 10       TB ER/IR Green 2x10 Blue, 2x10       TB Rows Green 2x10 + Ext, Blue, 3x10       Scap stab into flexion    NV     Wall climbs   NV                                                                      Modalities             CP PRN

## 2019-10-22 ENCOUNTER — OFFICE VISIT (OUTPATIENT)
Dept: PHYSICAL THERAPY | Facility: CLINIC | Age: 69
End: 2019-10-22
Payer: COMMERCIAL

## 2019-10-22 DIAGNOSIS — M75.51 SUBACROMIAL BURSITIS OF RIGHT SHOULDER JOINT: Primary | ICD-10-CM

## 2019-10-22 PROCEDURE — 97140 MANUAL THERAPY 1/> REGIONS: CPT | Performed by: PHYSICAL THERAPIST

## 2019-10-22 PROCEDURE — 97112 NEUROMUSCULAR REEDUCATION: CPT

## 2019-10-22 PROCEDURE — 97110 THERAPEUTIC EXERCISES: CPT

## 2019-10-23 ENCOUNTER — CONSULT (OUTPATIENT)
Dept: OBGYN CLINIC | Facility: HOSPITAL | Age: 69
End: 2019-10-23
Payer: COMMERCIAL

## 2019-10-23 ENCOUNTER — HOSPITAL ENCOUNTER (OUTPATIENT)
Dept: RADIOLOGY | Facility: HOSPITAL | Age: 69
Discharge: HOME | End: 2019-10-23
Attending: ORTHOPAEDIC SURGERY
Payer: COMMERCIAL

## 2019-10-23 VITALS
HEART RATE: 67 BPM | WEIGHT: 171 LBS | SYSTOLIC BLOOD PRESSURE: 173 MMHG | DIASTOLIC BLOOD PRESSURE: 75 MMHG | BODY MASS INDEX: 27.48 KG/M2 | HEIGHT: 66 IN

## 2019-10-23 DIAGNOSIS — M18.12 ARTHRITIS OF CARPOMETACARPAL (CMC) JOINT OF LEFT THUMB: ICD-10-CM

## 2019-10-23 DIAGNOSIS — M79.645 THUMB PAIN, LEFT: ICD-10-CM

## 2019-10-23 PROCEDURE — 99213 OFFICE O/P EST LOW 20 MIN: CPT | Performed by: ORTHOPAEDIC SURGERY

## 2019-10-23 PROCEDURE — 73140 X-RAY EXAM OF FINGER(S): CPT

## 2019-10-23 NOTE — PROGRESS NOTES
ASSESSMENT/PLAN:    Assessment:   Thumb CMC Arthritis  left    Plan:   The patient will continue with over-the-counter pain medications as needed and will be given a Comfort Cool brace today to wear for activity he will also meet with our occupational therapist in the office today for strengthening exercises for his left thumb  The patient will also be sent a prescription for Voltaren gel to his pharmacy today  We did discuss possible steroid injection and surgical intervention if his pain in his left thumb progresses  Follow Up:  PRN    To Do Next Visit:       General Discussions:     CMC Arthritis: The anatomy and physiology of carpometacarpal joint arthritis was discussed with the patient today in the office  Deterioration of the articular cartilage eventually leads to hypermobility at the thumb [facility] joint, resulting in joint subluxation, osteophyte formation, cystic changes within the trapezium and base of the first metacarpal, as well as subchondral sclerosis  Eventually, pain, limited mobility, and compensatory hyperextension at the metacarpophalangeal joint may develop  While normal activity and usage of the thumb joint may provide a painful experience to the patient, this typically does not result in damage to the thumb or hand  Treatment options include resting thumb spica splints to decreased joint edema, pain, and inflammation  Therapy exercises to strengthen the thenar musculature may relieve pain, but do not alter the overall continued development of osteoarthritis  Oral medications, topical medications, corticosteroid injections may decrease pain and increase overall function  Eventually, approximately 5% of patients may require surgical intervention         Operative Discussions:         _____________________________________________________  CHIEF COMPLAINT:  Chief Complaint   Patient presents with    Left Thumb - Pain         SUBJECTIVE:  Vera Agustin is a 68 y o  male who presents with Pain  Moderate  Intermittant  Sharp to the left thumb  This started  1 year(s) ago as Insidious  Patient states that at rest he has no pain into his left thumb however when he does heavy gripping activities he notices this sharp pain into his left thumb  He denies numbness and tingling  He has a hx of b/l CTR  Radiation: Yes to the  thumb  Previous Treatments: NSAIDs and activity modification with only partial relief  Associated symptoms: No Complaints    PAST MEDICAL HISTORY:  Past Medical History:   Diagnosis Date    Hyperlipidemia     Hypertension        PAST SURGICAL HISTORY:  Past Surgical History:   Procedure Laterality Date    BACK SURGERY      HAND SURGERY      Incision Tendon Sheath Of a Finger    ND COLONOSCOPY FLX DX W/COLLJ SPEC WHEN PFRMD N/A 1/15/2019    Procedure: COLONOSCOPY;  Surgeon: Donta Connolly MD;  Location: [facility]; Service: Gastroenterology    SINUS SURGERY      TONSILLECTOMY AND ADENOIDECTOMY         FAMILY HISTORY:  Family History   Problem Relation Age of Onset    Heart disease Mother     Colon cancer Father        SOCIAL HISTORY:  Social History     Tobacco Use    Smoking status: Former Smoker    Smokeless tobacco: Never Used   Substance Use Topics    Alcohol use:  Yes     Alcohol/week: 1 0 standard drinks     Types: 1 Cans of beer per week     Comment: social     Drug use: No       MEDICATIONS:    Current Outpatient Medications:     amLODIPine (NORVASC) 5 mg tablet, TAKE 1 TABLET EVERY DAY, Disp: 90 tablet, Rfl: 3    Ascorbic Acid (VITAMIN C) 500 MG CAPS, Take 1 capsule by mouth daily, Disp: , Rfl:     famotidine (PEPCID) 20 mg tablet, TAKE 1 TABLET (20 MG TOTAL) BY MOUTH DAILY, Disp: 90 tablet, Rfl: 1    metoprolol succinate (TOPROL-XL) 50 mg 24 hr tablet, TAKE 1 TABLET EVERY DAY, Disp: 90 tablet, Rfl: 3    Multiple Vitamins-Minerals (MULTIVITAMIN ADULTS 50+ PO), Take 1 tablet by mouth daily, Disp: , Rfl:     simvastatin (ZOCOR) 20 mg tablet, TAKE 1 TABLET EVERY DAY, Disp: 90 tablet, Rfl: 2    ALLERGIES:  No Known Allergies    REVIEW OF SYSTEMS:  Pertinent items are noted in HPI  LABS:  HgA1c:   Lab Results   Component Value Date    HGBA1C 5 3 09/30/2019     BMP:   Lab Results   Component Value Date    CALCIUM 9 9 09/30/2019     09/21/2017    K 4 3 09/30/2019    CO2 30 09/30/2019     09/30/2019    BUN 19 09/30/2019    CREATININE 1 03 09/30/2019         _____________________________________________________  PHYSICAL EXAMINATION:  Vital signs: BP (!) 173/75   Pulse 67   Ht 5' 6" (1 676 m)   Wt 77 6 kg (171 lb)   BMI 27 60 kg/m²   General: well developed and well nourished, alert, oriented times 3 and appears comfortable  Psychiatric: Normal  HEENT: Trachea Midline, No torticollis  Cardiovascular: No discernable arrhythmia  Pulmonary: No wheezing or stridor  Skin: No masses, erythema, lacerations, fluctation, ulcerations  Neurovascular: Sensation Intact to the Median, Ulnar, Radial Nerve, Motor Intact to the Median, Ulnar, Radial Nerve and Pulses Intact    MUSCULOSKELETAL EXAMINATION:  LEFT SIDE:  CMC: Positive grind, Positive tendnerness CMC and negative Hyperextension MP, no instability, no triggering, negative finkelstein, negative tinels at carpal tunnel     _____________________________________________________  STUDIES REVIEWED:  Images were reviewd in PACS: xray of left thumb on 10/23/19 demonstrates stage 2 thumb cmc jt arthritis         PROCEDURES PERFORMED:  Procedures  No Procedures performed today   Scribe Attestation    I,:   Niecy Elkins am acting as a scribe while in the presence of the attending physician :        I,:   Carey Plascencia MD personally performed the services described in this documentation    as scribed in my presence :

## 2019-10-23 NOTE — PATIENT INSTRUCTIONS
What is it [facility] Arthritis? In a normal joint, cartilage covers the end of the bones and serves as a shock absorber to allow smooth, pain-free movement  In osteoarthritis (OA, or degenerative arthritis) the cartilage layer wears out, resulting in direct contact between the bones and producing pain and deformity  One of the most common joints to develop OA in the hand is the base of the thumb  The thumb basal joint, also called the carpometacarpal [place]) joint, is a specialized saddle shaped joint that is formed by a small bone of the wrist (trapezium) and the first bone of the thumb (metacarpal)  The saddle shaped joint allows the thumb to have a wide range of motions, including up, down, across the palm, and the ability to pinch (see Figure 1)  Who gets it? OA at the base of the thumb is more commonly seen in women over the age of 36  The exact cause is unknown, but genetics, previous injuries such as fractures or dislocations, and generalized joint laxity may predispose towards development of this type of arthritis  What are the symptoms and signs? The most common symptom is pain at the base of the thumb  The pain can be aggravated by activities that require pinching, such as opening jars, turning door knobs or keys, and writing  Severity can also progress to pain at rest and pain at night  In more severe cases, progressive destruction and mal-alignment of the joint occurs, and a bump develops at the base of the thumb as the metacarpal moves out of the saddle joint  This shift in the joint can cause limited motion and weakness, making pinch difficult (see Figure 2)  The next joint above the [facility] may compensate by loosening, causing it to bend further back (hyperextension)  How is the diagnosis made? The diagnosis is made by history and physical evaluation  Pressure and movement such as twisting will produce pain at the joint  A grinding sensation may also be present at the joint (see Figure 3)  X-rays are used to confirm the diagnosis, although symptom severity often does not correlate with x-ray findings  What are the treatment options? Less severe thumb arthritis will usually respond to non-surgical care  Arthritis medication, splinting and limited cortisone injections may help alleviate pain  A hand therapist might provide a variety of rigid and non-rigid splints which can be used while sleeping or during activities  Patients with advanced disease or who fail non-surgical treatment may be candidates for surgical reconstruction  A variety of surgical techniques are available that can successfully reduce or eliminate pain  Surgical procedures include removal of arthritic bone and joint reconstruction (arthroplasty), joint fusion, bone realignment, and even arthroscopy in select cases  A consultation with your hand surgeon can help decide the best option for you (see Figure 4)  © 2012 [employer]  www handcare  org

## 2019-10-24 ENCOUNTER — APPOINTMENT (OUTPATIENT)
Dept: PHYSICAL THERAPY | Facility: CLINIC | Age: 69
End: 2019-10-24
Payer: COMMERCIAL

## 2019-10-29 ENCOUNTER — OFFICE VISIT (OUTPATIENT)
Dept: PHYSICAL THERAPY | Facility: CLINIC | Age: 69
End: 2019-10-29
Payer: COMMERCIAL

## 2019-10-29 DIAGNOSIS — M75.51 SUBACROMIAL BURSITIS OF RIGHT SHOULDER JOINT: Primary | ICD-10-CM

## 2019-10-29 PROCEDURE — 97112 NEUROMUSCULAR REEDUCATION: CPT | Performed by: PHYSICAL THERAPIST

## 2019-10-29 PROCEDURE — 97110 THERAPEUTIC EXERCISES: CPT | Performed by: PHYSICAL THERAPIST

## 2019-10-29 PROCEDURE — 97140 MANUAL THERAPY 1/> REGIONS: CPT | Performed by: PHYSICAL THERAPIST

## 2019-10-29 NOTE — PROGRESS NOTES
Daily Note     Today's date: 10/29/2019  Patient name: Nelly Hartley  : 1950  MRN: [id number]  Referring provider: Yunior Reeves MD  Dx:   Encounter Diagnosis     ICD-10-CM    1  Subacromial bursitis of right shoulder joint M75 51        Start Time: 56  Stop Time: 1005  Total time in clinic (min): 48 minutes    Subjective: "I feel like I'm back at square one  The shot probably wore off  Not sure if this is counterproductive or not"      Objective: See treatment diary below      Assessment: Tolerated treatment well  Patient would benefit from continued PT  Patient still with primary complaints of pain when reaching overhead or with abduction  Discussed with patient the chronicity of his injury (18 months) vs very short time period of therapy to date (4-5 visits)  Patient with hypomobile thoracic spine, and was able to report slight decreased pain with shoulder flexion after thoracic PA mobilizations  Will continue to progress scapular strength as able  Plan: Progress treatment as tolerated         Diagnosis:    Precautions:    Manuals 10/15 10/17  10/22 10/29    PROM Flexion   ER/IR    Mobs GH AP and Inf Grade III  GH AP and Inf Grade III - RT, PT  GH AP and Inf Grade III - RT, PT  Thoracic PA Grade III                    Exercise Diary        UBE   2/2 mins 2/2 mins            SL ER 2# 3x10 4#, 2x10  3#, 2x10      Prone H-abd    3# 2x10    Prone Rows    5# 2x10    Prone Lower trap    1# 2x10    Doorway Stretch    3x 30 sec      Bent over Lats        Supine Punch 4# 3x10 5#, 3x10  5#, 3x10      Sleeper Stretch 10" x 10 10" x 10  10" x 10      TB ER/IR Green 2x10 Blue, 2x10  Black, 3x10  ea     TB Rows Green 2x10 + Ext, Blue, 3x10  + Ext, Black, 3x10      Scap stab into flexion    [place], 5x      Wall climbs   NV      Wall Clocks    [place], 5x                    Patient Educatin       PT, Progress, Chronicity of injury                                 Modalities             CP PRN

## 2019-10-31 ENCOUNTER — OFFICE VISIT (OUTPATIENT)
Dept: PHYSICAL THERAPY | Facility: CLINIC | Age: 69
End: 2019-10-31
Payer: COMMERCIAL

## 2019-10-31 DIAGNOSIS — M75.51 SUBACROMIAL BURSITIS OF RIGHT SHOULDER JOINT: Primary | ICD-10-CM

## 2019-10-31 PROCEDURE — 97140 MANUAL THERAPY 1/> REGIONS: CPT | Performed by: PHYSICAL THERAPIST

## 2019-10-31 PROCEDURE — 97112 NEUROMUSCULAR REEDUCATION: CPT | Performed by: PHYSICAL THERAPIST

## 2019-10-31 PROCEDURE — 97110 THERAPEUTIC EXERCISES: CPT | Performed by: PHYSICAL THERAPIST

## 2019-10-31 NOTE — PROGRESS NOTES
Daily Note     Today's date: 10/31/2019  Patient name: Faviola Marroquin  : 1950  MRN: [id number]  Referring provider: Antonio Ortega MD  Dx:   Encounter Diagnosis     ICD-10-CM    1  Subacromial bursitis of right shoulder joint M75 51        Start Time:   Stop Time: 952  Total time in clinic (min): 42 minutes    Subjective: "I"m doing OK today"      Objective: See treatment diary below      Assessment: Tolerated treatment well  Patient would benefit from continued PT  Patient still reports clicking in his shoulder  Continuing to focus on strength of his scapular musculature  Increased weights and reps today which patient was able to complete with only complaints of fatigue  Patient will be re-evaluated at the next visit  Plan: Progress treatment as tolerated         Diagnosis:    Precautions:    Manuals 10/15 10/17  10/22 10/29 10/31   PROM Flexion   ER/IR    Mobs GH AP and Inf Grade III  GH AP and Inf Grade III - RT, PT  GH AP and Inf Grade III - RT, PT  Thoracic PA Grade III Thoracic PA Grade III                   Exercise Diary        UBE   2/2 mins 2/2 mins 2/2 mins           SL ER 2# 3x10 4#, 2x10  3#, 2x10      Prone H-abd    3# 2x10 3# 2x12   Prone Rows    5# 2x10 5# 2x10   Prone Lower trap    1# 2x10 1# 2x10   Doorway Stretch    3x 30 sec      Bent over Lats        Supine Punch 4# 3x10 5#, 3x10  5#, 3x10      Sleeper Stretch 10" x 10 10" x 10  10" x 10      TB ER/IR Green 2x10 Blue, 2x10  Black, 3x10  ea     TB Rows Green 2x10 + Ext, Blue, 3x10  + Ext, Black, 3x10   [place] 20# 2x12 / Shoulder Ext 20# 2x12   Scap stab into flexion    [place], 5x      Wall climbs   NV   Red 2x5   Wall Clocks    [place], 5x   Red 2x3                 Patient Educatin       PT, Progress, Chronicity of injury                                 Modalities             CP PRN

## 2019-11-05 ENCOUNTER — EVALUATION (OUTPATIENT)
Dept: PHYSICAL THERAPY | Facility: CLINIC | Age: 69
End: 2019-11-05
Payer: COMMERCIAL

## 2019-11-05 DIAGNOSIS — M75.51 SUBACROMIAL BURSITIS OF RIGHT SHOULDER JOINT: Primary | ICD-10-CM

## 2019-11-05 PROCEDURE — 97110 THERAPEUTIC EXERCISES: CPT | Performed by: PHYSICAL THERAPIST

## 2019-11-05 PROCEDURE — 97112 NEUROMUSCULAR REEDUCATION: CPT | Performed by: PHYSICAL THERAPIST

## 2019-11-05 NOTE — PROGRESS NOTES
PT Re-Evaluation     Today's date: 2019  Patient name: Krystal Fulton  : 1950  MRN: [id number]  Referring provider: Clemente Guerrero MD  Dx:   Encounter Diagnosis     ICD-10-CM    1  Subacromial bursitis of right shoulder joint M75 51        Start Time: 915  Stop Time: 957  Total time in clinic (min): 42 minutes    Assessment  Assessment details: Krystal Fulton is a 68 y o  male who has been consistent with attending and participating in skilled physical therapy sessions  The patient at this time reports that he feels he hasn't made any improvement with physical therapy  He is still reporting high pain with any overhead motion  He has not seen any increases with his ROM where he is limited especially with IR  The patient has demonstrated independence with his HEP and will continue to follow this at home  He has an appointment with Dr Mishel Lopez again in less than 2 weeks  Due to the patient's lack of progress at this time, it was recommended that he follow up with ortho for further evaluation  Impairments: abnormal muscle firing, abnormal or restricted ROM, activity intolerance, lacks appropriate home exercise program and pain with function  Understanding of Dx/Px/POC: good   Prognosis: good    Goals  Impairment Goals:  1  Patient will decrease complaints of pain by 50% at worst in 4 weeks - PARTIALLY MET  2  Patient will increase IR to lower thoracic BTB in 8 weeks - PARTIALLY MET    Functional Goals:  1  Patient will be independent with HEP by discharge - PARTIALLY MET  2  Patient will increase FOTO to average norms by discharge - PARTIALLY MET  3  Patient will be able to reach an overhead shelf with decreased complaints of pain in 4 weeks - PARTIALLY MET  4  Patient will tuck in his shirt with decreased pain in 8 weeks - PARTIALLY MET  5    Patient will return to prior level of function by discharge - PARTIALLY MET      Plan  Patient would benefit from: skilled physical therapy  Planned modality interventions: cryotherapy, TENS and thermotherapy: hydrocollator packs  Planned therapy interventions: flexibility, functional ROM exercises, graded activity, graded exercise, home exercise program, therapeutic exercise, therapeutic activities, stretching, strengthening, patient education, postural training, neuromuscular re-education, muscle pump exercises, motor coordination training, Rooney taping, massage, manual therapy and joint mobilization  Frequency: 2x week (1-2x / week)  Duration in weeks: 4  Treatment plan discussed with: patient        Subjective Evaluation    History of Present Illness  Mechanism of injury: Patient reports that he is still having pain when he reaches from up above shoulder height and reaching down across his body such as when trying to grab a seat belt  He still notes having difficulties with reaching behind his back, putting on a belt, and tucking in a shirt  The patient notes that most of his pain is always accompanied by a "snapping" sensation is his shoulder  He also notes having difficulties sleeping on his shoulder  HPI:  Patient reports that his right shoulder 18 months ago  He reports insidious onset of his pain, but reports that he has been able to do all of his functional activities but with pain  He mentioned to his PCP that his shoulder has been bothering him and he was seen by ortho  X-rays were (-) prior for any bony pathology  He was given a corticosteroid injection was referred to physical therapy  Pain Location:  Anterior/Superior right shoulder   Occupation:  Retired   Prior Functional Limitations:   Independent prior   AGG:  Reaching above head, tucking shirt in, reaching behind back  Ease:  Alleve    Patient Goals:  "I just don't want it to hurt anymore"     Pain  Current pain ratin  At best pain ratin  At worst pain ratin  Quality: sharp ("snapping")          Objective     Active Range of Motion   Left Shoulder   Flexion: 145 degrees   Internal rotation BTB: lumbar     Right Shoulder   Flexion: 142 degrees with pain  Internal rotation BTB: sacrum with pain    Passive Range of Motion     Right Shoulder   Flexion: 155 degrees with pain  External rotation 90°: 68 degrees with pain  Internal rotation 90°: 33 degrees with pain    Strength/Myotome Testing     Left Shoulder     Planes of Motion   Flexion: 5   Abduction: 5   External rotation at 0°: 5   Internal rotation at 0°: 5     Right Shoulder     Planes of Motion   Flexion: 5   Abduction: 5   External rotation at 0°: 5   Internal rotation at 0°: 5     Additional Strength Details  Pain with testing lower traps on right     Tests     Right Shoulder   Positive Hawkin's         Flowsheet Rows      Most Recent Value   PT/OT G-Codes   Current Score  58   Projected Score  72              Diagnosis:    Precautions:    Manuals 11/5 10/17  10/22 10/29 10/31   PROM    ER/IR    Mobs  GH AP and Inf Grade III - RT, PT  GH AP and Inf Grade III - RT, PT  Thoracic PA Grade III Thoracic PA Grade III                   Exercise Diary        UBE 2/2  2/2 mins 2/2 mins 2/2 mins           SL ER  4#, 2x10  3#, 2x10      Prone H-abd    3# 2x10 3# 2x12   Prone Rows    5# 2x10 5# 2x10   Prone Lower trap    1# 2x10 1# 2x10   Doorway Stretch    3x 30 sec      Bent over Lats        Supine Punch  5#, 3x10  5#, 3x10      Sleeper Stretch  10" x 10  10" x 10      TB ER/IR  Blue, 2x10  Black, 3x10  ea     TB Rows  + Ext, Blue, 3x10  + Ext, Black, 3x10   [place] 20# 2x12 / Shoulder Ext 20# 2x12   Scap stab into flexion    [place], 5x      Wall climbs   NV   Red 2x5   Wall Clocks    [place], 5x   Red 2x3                Patient Education      PT, Progress, Chronicity of injury     Review of FOTO / Re-Evaluation RT, PT                        Modalities            CP PRN

## 2019-11-18 ENCOUNTER — OFFICE VISIT (OUTPATIENT)
Dept: OBGYN CLINIC | Facility: HOSPITAL | Age: 69
End: 2019-11-18
Payer: COMMERCIAL

## 2019-11-18 VITALS
WEIGHT: 172 LBS | HEART RATE: 68 BPM | SYSTOLIC BLOOD PRESSURE: 184 MMHG | DIASTOLIC BLOOD PRESSURE: 80 MMHG | HEIGHT: 66 IN | BODY MASS INDEX: 27.64 KG/M2

## 2019-11-18 DIAGNOSIS — M24.811 CREPITUS OF RIGHT SHOULDER JOINT: ICD-10-CM

## 2019-11-18 DIAGNOSIS — G89.29 CHRONIC PAIN IN RIGHT SHOULDER: Primary | ICD-10-CM

## 2019-11-18 DIAGNOSIS — M25.511 CHRONIC PAIN IN RIGHT SHOULDER: Primary | ICD-10-CM

## 2019-11-18 PROCEDURE — 99213 OFFICE O/P EST LOW 20 MIN: CPT | Performed by: PHYSICIAN ASSISTANT

## 2019-11-18 NOTE — PROGRESS NOTES
Assessment  Diagnoses and all orders for this visit:    Chronic pain in right shoulder    Crepitus of right shoulder joint      Discussion and Plan:    1  Transition to home exercise program for right shoulder  2  Tylenol PRN  3  Follow up as needed    I had a thorough discussion with Catherine Thorne about his shoulder as he did not note significant improvement with the right shoulder with therapy  He states it does not prevent him from doing anything he wants or needs to get done  He is aware the next step in diagnosing his problem would be a CT arthrogram (pt is severely claustrophobic and cannot tolerate MRI)  Even if he knew he had a structural problem with the shoulder, he would not be interested in anything surgical   If the shoulder discomfort worsens to where he is limited with activities of enjoyment (playing golf, or with grandchildren) or activities of daily living - he will call the office  His exam is not worrisome for rotator cuff pathology  He is stiff and experiences discomfort with crepitation so this is likely more of an arthritic problem  X-rays were inconclusive last year as they are not [place] views  CT would help with this diagnosis, but not needed if he is not bothered with the right shoulder  Subjective:   Patient ID: Caleb Cogan is a 68 y o  male      Catherine Thorne returns to the office in follow up of the right shoulder  He continues to have crepitation and popping which causes discomfort  The pain is fleeting and does not linger  He does not have any limitation with activities of daily living or activities of enjoyment  He is able to play golf without issues  He can play with his grandchildren without pain  He cannot throw a baseball or football hard or far but he can play catch  He denies new injury, trauma, numbness or tingling          The following portions of the patient's history were reviewed and updated as appropriate: allergies, current medications, past family history, past medical history, past social history, past surgical history and problem list     Review of Systems   Constitutional: Negative for chills and fever  HENT: Negative for hearing loss  Eyes: Negative for visual disturbance  Respiratory: Negative for shortness of breath  Cardiovascular: Negative for chest pain  Gastrointestinal: Negative for abdominal pain  Musculoskeletal:        As reviewed in the HPI   Skin: Negative for rash  Neurological:        As reviewed in the HPI   Psychiatric/Behavioral: Negative for agitation  Objective:  BP (!) 184/80 Comment: pt reports white coat syndrome-ok at home  Pulse 68   Ht 5' 6" (1 676 m)   Wt 78 kg (172 lb)   BMI 27 76 kg/m²       Right Shoulder Exam     Tenderness   The patient is experiencing no tenderness  Range of Motion   Passive abduction: 100   External rotation: 50   Forward flexion: 160   Internal rotation 0 degrees: Lumbar     Muscle Strength   External rotation: 5/5   Supraspinatus: 5/5     Tests   Berry test: negative  Impingement: negative  Drop arm: negative    Other   Erythema: absent  Sensation: normal  Pulse: present    Comments:  Negative speeds  Crepitation with certain motions            Physical Exam   Constitutional: He is oriented to person, place, and time  He appears well-developed and well-nourished  HENT:   Head: Normocephalic  Eyes: EOM are normal    Neck: Normal range of motion  Pulmonary/Chest: No respiratory distress  He has no wheezes  Neurological: He is alert and oriented to person, place, and time  Skin: Skin is warm and dry  Psychiatric: He has a normal mood and affect   His behavior is normal  Judgment and thought content normal

## 2019-12-16 NOTE — PROGRESS NOTES
PT Discharge    Today's date: 2019  Patient name: Avery Nava  : 1950  MRN: [id number]  Referring provider: Sharita De La Torre MD  Dx:   Encounter Diagnosis     ICD-10-CM    1  Subacromial bursitis of right shoulder joint M75 51 PT plan of care cert/re-cert     CANCELED: PT plan of care cert/re-cert     Patient went to see ortho and will continue with his HEP at home  He will be discharged at this time        Assessment/Plan    Subjective    Objective

## 2019-12-19 LAB
CHOLEST SERPL-MCNC: 181 MG/DL
CHOLEST/HDLC SERPL: 2.4 (CALC)
HDLC SERPL-MCNC: 76 MG/DL
LDLC SERPL CALC-MCNC: 88 MG/DL (CALC)
NONHDLC SERPL-MCNC: 105 MG/DL (CALC)
TRIGL SERPL-MCNC: 84 MG/DL

## 2020-01-20 DIAGNOSIS — I10 ESSENTIAL HYPERTENSION: ICD-10-CM

## 2020-01-20 RX ORDER — AMLODIPINE BESYLATE 5 MG/1
5 TABLET ORAL DAILY
Qty: 90 TABLET | Refills: 3 | Status: SHIPPED | OUTPATIENT
Start: 2020-01-20 | End: 2021-01-02

## 2020-03-05 DIAGNOSIS — I10 ESSENTIAL HYPERTENSION: ICD-10-CM

## 2020-03-05 DIAGNOSIS — K21.9 GASTROESOPHAGEAL REFLUX DISEASE WITHOUT ESOPHAGITIS: ICD-10-CM

## 2020-03-05 DIAGNOSIS — E78.5 HYPERLIPIDEMIA, UNSPECIFIED HYPERLIPIDEMIA TYPE: ICD-10-CM

## 2020-03-05 RX ORDER — SIMVASTATIN 20 MG
20 TABLET ORAL DAILY
Qty: 90 TABLET | Refills: 0 | Status: SHIPPED | OUTPATIENT
Start: 2020-03-05 | End: 2020-05-24

## 2020-03-05 RX ORDER — FAMOTIDINE 20 MG/1
20 TABLET, FILM COATED ORAL DAILY
Qty: 90 TABLET | Refills: 0 | Status: SHIPPED | OUTPATIENT
Start: 2020-03-05 | End: 2020-05-24

## 2020-03-05 RX ORDER — METOPROLOL SUCCINATE 50 MG/1
50 TABLET, EXTENDED RELEASE ORAL DAILY
Qty: 90 TABLET | Refills: 0 | Status: SHIPPED | OUTPATIENT
Start: 2020-03-05 | End: 2020-05-24

## 2020-03-11 LAB — PSA SERPL-MCNC: 1.4 NG/ML

## 2020-03-31 ENCOUNTER — TELEPHONE (OUTPATIENT)
Dept: UROLOGY | Facility: AMBULATORY SURGERY CENTER | Age: 70
End: 2020-03-31

## 2020-03-31 NOTE — TELEPHONE ENCOUNTER
Called pt to switch appointment to televisit  Pt declined appointment and wanted to cancel until further notice  He feels he is doing much better and will call in the future for another appointment

## 2020-05-24 DIAGNOSIS — E78.5 HYPERLIPIDEMIA, UNSPECIFIED HYPERLIPIDEMIA TYPE: ICD-10-CM

## 2020-05-24 DIAGNOSIS — I10 ESSENTIAL HYPERTENSION: ICD-10-CM

## 2020-05-24 DIAGNOSIS — K21.9 GASTROESOPHAGEAL REFLUX DISEASE WITHOUT ESOPHAGITIS: ICD-10-CM

## 2020-05-24 RX ORDER — FAMOTIDINE 20 MG/1
TABLET, FILM COATED ORAL
Qty: 90 TABLET | Refills: 0 | Status: SHIPPED | OUTPATIENT
Start: 2020-05-24 | End: 2020-08-23

## 2020-05-24 RX ORDER — SIMVASTATIN 20 MG
TABLET ORAL
Qty: 90 TABLET | Refills: 0 | Status: SHIPPED | OUTPATIENT
Start: 2020-05-24 | End: 2020-08-23

## 2020-05-24 RX ORDER — METOPROLOL SUCCINATE 50 MG/1
TABLET, EXTENDED RELEASE ORAL
Qty: 90 TABLET | Refills: 0 | Status: SHIPPED | OUTPATIENT
Start: 2020-05-24 | End: 2020-08-23

## 2020-06-11 ENCOUNTER — OFFICE VISIT (OUTPATIENT)
Dept: URGENT CARE | Facility: CLINIC | Age: 70
End: 2020-06-11
Payer: COMMERCIAL

## 2020-06-11 VITALS
WEIGHT: 162 LBS | SYSTOLIC BLOOD PRESSURE: 171 MMHG | DIASTOLIC BLOOD PRESSURE: 79 MMHG | HEART RATE: 67 BPM | HEIGHT: 69 IN | TEMPERATURE: 97.7 F | OXYGEN SATURATION: 99 % | BODY MASS INDEX: 23.99 KG/M2

## 2020-06-11 DIAGNOSIS — H61.23 BILATERAL HEARING LOSS DUE TO CERUMEN IMPACTION: Primary | ICD-10-CM

## 2020-06-11 PROCEDURE — 99213 OFFICE O/P EST LOW 20 MIN: CPT | Performed by: NURSE PRACTITIONER

## 2020-06-11 PROCEDURE — S9083 URGENT CARE CENTER GLOBAL: HCPCS | Performed by: NURSE PRACTITIONER

## 2020-06-11 PROCEDURE — 69210 REMOVE IMPACTED EAR WAX UNI: CPT | Performed by: NURSE PRACTITIONER

## 2020-08-22 DIAGNOSIS — I10 ESSENTIAL HYPERTENSION: ICD-10-CM

## 2020-08-22 DIAGNOSIS — E78.5 HYPERLIPIDEMIA, UNSPECIFIED HYPERLIPIDEMIA TYPE: ICD-10-CM

## 2020-08-22 DIAGNOSIS — K21.9 GASTROESOPHAGEAL REFLUX DISEASE WITHOUT ESOPHAGITIS: ICD-10-CM

## 2020-08-23 RX ORDER — FAMOTIDINE 20 MG/1
TABLET, FILM COATED ORAL
Qty: 90 TABLET | Refills: 0 | Status: SHIPPED | OUTPATIENT
Start: 2020-08-23 | End: 2020-11-18

## 2020-08-23 RX ORDER — METOPROLOL SUCCINATE 50 MG/1
TABLET, EXTENDED RELEASE ORAL
Qty: 90 TABLET | Refills: 0 | Status: SHIPPED | OUTPATIENT
Start: 2020-08-23 | End: 2020-11-18

## 2020-08-23 RX ORDER — SIMVASTATIN 20 MG
TABLET ORAL
Qty: 90 TABLET | Refills: 0 | Status: SHIPPED | OUTPATIENT
Start: 2020-08-23 | End: 2020-11-18

## 2020-10-20 ENCOUNTER — HOSPITAL ENCOUNTER (OUTPATIENT)
Dept: RADIOLOGY | Facility: HOSPITAL | Age: 70
Discharge: HOME | End: 2020-10-20
Payer: COMMERCIAL

## 2020-10-20 ENCOUNTER — OFFICE VISIT (OUTPATIENT)
Dept: INTERNAL MEDICINE CLINIC | Facility: CLINIC | Age: 70
End: 2020-10-20
Payer: COMMERCIAL

## 2020-10-20 DIAGNOSIS — Z00.00 MEDICARE ANNUAL WELLNESS VISIT, SUBSEQUENT: Primary | ICD-10-CM

## 2020-10-20 DIAGNOSIS — M54.2 NECK PAIN: ICD-10-CM

## 2020-10-20 DIAGNOSIS — Z87.898 HISTORY OF PREDIABETES: ICD-10-CM

## 2020-10-20 DIAGNOSIS — Z12.5 SCREENING PSA (PROSTATE SPECIFIC ANTIGEN): ICD-10-CM

## 2020-10-20 DIAGNOSIS — I10 ESSENTIAL HYPERTENSION: ICD-10-CM

## 2020-10-20 DIAGNOSIS — E78.5 HYPERLIPIDEMIA, UNSPECIFIED HYPERLIPIDEMIA TYPE: ICD-10-CM

## 2020-10-20 PROCEDURE — 1160F RVW MEDS BY RX/DR IN RCRD: CPT | Performed by: INTERNAL MEDICINE

## 2020-10-20 PROCEDURE — 1125F AMNT PAIN NOTED PAIN PRSNT: CPT | Performed by: INTERNAL MEDICINE

## 2020-10-20 PROCEDURE — 99214 OFFICE O/P EST MOD 30 MIN: CPT | Performed by: INTERNAL MEDICINE

## 2020-10-20 PROCEDURE — G0439 PPPS, SUBSEQ VISIT: HCPCS | Performed by: INTERNAL MEDICINE

## 2020-10-20 PROCEDURE — 72050 X-RAY EXAM NECK SPINE 4/5VWS: CPT

## 2020-10-20 PROCEDURE — 1036F TOBACCO NON-USER: CPT | Performed by: INTERNAL MEDICINE

## 2020-10-20 PROCEDURE — 1170F FXNL STATUS ASSESSED: CPT | Performed by: INTERNAL MEDICINE

## 2020-10-22 LAB
ALBUMIN SERPL-MCNC: 4.4 G/DL (ref 3.6–5.1)
ALBUMIN/GLOB SERPL: 1.7 (CALC) (ref 1–2.5)
ALP SERPL-CCNC: 73 U/L (ref 35–144)
ALT SERPL-CCNC: 20 U/L (ref 9–46)
AST SERPL-CCNC: 23 U/L (ref 10–35)
BILIRUB SERPL-MCNC: 0.7 MG/DL (ref 0.2–1.2)
BUN SERPL-MCNC: 26 MG/DL (ref 7–25)
BUN/CREAT SERPL: 23 (CALC) (ref 6–22)
CALCIUM SERPL-MCNC: 9.8 MG/DL (ref 8.6–10.3)
CHLORIDE SERPL-SCNC: 101 MMOL/L (ref 98–110)
CHOLEST SERPL-MCNC: 172 MG/DL
CHOLEST/HDLC SERPL: 2 (CALC)
CO2 SERPL-SCNC: 27 MMOL/L (ref 20–32)
CREAT SERPL-MCNC: 1.12 MG/DL (ref 0.7–1.18)
GLOBULIN SER CALC-MCNC: 2.6 G/DL (CALC) (ref 1.9–3.7)
GLUCOSE SERPL-MCNC: 91 MG/DL (ref 65–99)
HBA1C MFR BLD: 5.1 % OF TOTAL HGB
HDLC SERPL-MCNC: 84 MG/DL
LDLC SERPL CALC-MCNC: 74 MG/DL (CALC)
NONHDLC SERPL-MCNC: 88 MG/DL (CALC)
POTASSIUM SERPL-SCNC: 4.1 MMOL/L (ref 3.5–5.3)
PROT SERPL-MCNC: 7 G/DL (ref 6.1–8.1)
SL AMB EGFR AFRICAN AMERICAN: 76 ML/MIN/1.73M2
SL AMB EGFR NON AFRICAN AMERICAN: 65 ML/MIN/1.73M2
SODIUM SERPL-SCNC: 139 MMOL/L (ref 135–146)
TRIGL SERPL-MCNC: 64 MG/DL

## 2020-10-27 DIAGNOSIS — M54.2 CERVICALGIA: Primary | ICD-10-CM

## 2020-10-27 DIAGNOSIS — R93.7 ABNORMAL X-RAY OF CERVICAL SPINE: ICD-10-CM

## 2020-11-18 DIAGNOSIS — E78.5 HYPERLIPIDEMIA, UNSPECIFIED HYPERLIPIDEMIA TYPE: ICD-10-CM

## 2020-11-18 DIAGNOSIS — K21.9 GASTROESOPHAGEAL REFLUX DISEASE WITHOUT ESOPHAGITIS: ICD-10-CM

## 2020-11-18 DIAGNOSIS — I10 ESSENTIAL HYPERTENSION: ICD-10-CM

## 2020-11-18 RX ORDER — FAMOTIDINE 20 MG/1
TABLET, FILM COATED ORAL
Qty: 90 TABLET | Refills: 0 | Status: SHIPPED | OUTPATIENT
Start: 2020-11-18 | End: 2021-02-16

## 2020-11-18 RX ORDER — METOPROLOL SUCCINATE 50 MG/1
TABLET, EXTENDED RELEASE ORAL
Qty: 90 TABLET | Refills: 0 | Status: SHIPPED | OUTPATIENT
Start: 2020-11-18 | End: 2021-02-16

## 2020-11-18 RX ORDER — SIMVASTATIN 20 MG
TABLET ORAL
Qty: 90 TABLET | Refills: 0 | Status: SHIPPED | OUTPATIENT
Start: 2020-11-18 | End: 2021-02-16

## 2021-01-02 DIAGNOSIS — I10 ESSENTIAL HYPERTENSION: ICD-10-CM

## 2021-01-02 RX ORDER — AMLODIPINE BESYLATE 5 MG/1
TABLET ORAL
Qty: 90 TABLET | Refills: 3 | Status: SHIPPED | OUTPATIENT
Start: 2021-01-02 | End: 2021-12-21

## 2021-02-13 DIAGNOSIS — Z23 ENCOUNTER FOR IMMUNIZATION: ICD-10-CM

## 2021-02-15 ENCOUNTER — IMMUNIZATIONS (OUTPATIENT)
Dept: FAMILY MEDICINE CLINIC | Facility: HOSPITAL | Age: 71
End: 2021-02-15

## 2021-02-15 DIAGNOSIS — Z23 ENCOUNTER FOR IMMUNIZATION: Primary | ICD-10-CM

## 2021-02-15 PROCEDURE — 0001A SARS-COV-2 / COVID-19 MRNA VACCINE (PFIZER-BIONTECH) 30 MCG: CPT

## 2021-02-15 PROCEDURE — 91300 SARS-COV-2 / COVID-19 MRNA VACCINE (PFIZER-BIONTECH) 30 MCG: CPT

## 2021-02-16 DIAGNOSIS — I10 ESSENTIAL HYPERTENSION: ICD-10-CM

## 2021-02-16 DIAGNOSIS — K21.9 GASTROESOPHAGEAL REFLUX DISEASE WITHOUT ESOPHAGITIS: ICD-10-CM

## 2021-02-16 DIAGNOSIS — E78.5 HYPERLIPIDEMIA, UNSPECIFIED HYPERLIPIDEMIA TYPE: ICD-10-CM

## 2021-02-16 RX ORDER — SIMVASTATIN 20 MG
TABLET ORAL
Qty: 90 TABLET | Refills: 0 | Status: SHIPPED | OUTPATIENT
Start: 2021-02-16 | End: 2021-05-17

## 2021-02-16 RX ORDER — METOPROLOL SUCCINATE 50 MG/1
TABLET, EXTENDED RELEASE ORAL
Qty: 90 TABLET | Refills: 0 | Status: SHIPPED | OUTPATIENT
Start: 2021-02-16 | End: 2021-05-17

## 2021-02-16 RX ORDER — FAMOTIDINE 20 MG/1
TABLET, FILM COATED ORAL
Qty: 90 TABLET | Refills: 0 | Status: SHIPPED | OUTPATIENT
Start: 2021-02-16 | End: 2021-05-17

## 2021-03-08 ENCOUNTER — IMMUNIZATIONS (OUTPATIENT)
Dept: FAMILY MEDICINE CLINIC | Facility: HOSPITAL | Age: 71
End: 2021-03-08

## 2021-03-08 DIAGNOSIS — Z23 ENCOUNTER FOR IMMUNIZATION: Primary | ICD-10-CM

## 2021-03-08 PROCEDURE — 0002A SARS-COV-2 / COVID-19 MRNA VACCINE (PFIZER-BIONTECH) 30 MCG: CPT

## 2021-03-08 PROCEDURE — 91300 SARS-COV-2 / COVID-19 MRNA VACCINE (PFIZER-BIONTECH) 30 MCG: CPT

## 2021-04-09 ENCOUNTER — VBI (OUTPATIENT)
Dept: ADMINISTRATIVE | Facility: OTHER | Age: 71
End: 2021-04-09

## 2021-04-27 ENCOUNTER — HOSPITAL ENCOUNTER (OUTPATIENT)
Dept: VASCULAR ULTRASOUND | Facility: HOSPITAL | Age: 71
Discharge: HOME | End: 2021-04-27
Payer: COMMERCIAL

## 2021-04-27 ENCOUNTER — OFFICE VISIT (OUTPATIENT)
Dept: INTERNAL MEDICINE CLINIC | Facility: CLINIC | Age: 71
End: 2021-04-27
Payer: COMMERCIAL

## 2021-04-27 ENCOUNTER — HOSPITAL ENCOUNTER (OUTPATIENT)
Dept: RADIOLOGY | Facility: HOSPITAL | Age: 71
Discharge: HOME | End: 2021-04-27
Payer: COMMERCIAL

## 2021-04-27 VITALS
BODY MASS INDEX: 24.27 KG/M2 | RESPIRATION RATE: 16 BRPM | DIASTOLIC BLOOD PRESSURE: 72 MMHG | HEART RATE: 64 BPM | SYSTOLIC BLOOD PRESSURE: 132 MMHG | HEIGHT: 69 IN

## 2021-04-27 DIAGNOSIS — R60.9 EDEMA, UNSPECIFIED TYPE: ICD-10-CM

## 2021-04-27 DIAGNOSIS — M79.604 PAIN OF RIGHT LOWER EXTREMITY: Primary | ICD-10-CM

## 2021-04-27 DIAGNOSIS — M79.604 PAIN OF RIGHT LOWER EXTREMITY: ICD-10-CM

## 2021-04-27 PROCEDURE — 93971 EXTREMITY STUDY: CPT

## 2021-04-27 PROCEDURE — 3725F SCREEN DEPRESSION PERFORMED: CPT | Performed by: INTERNAL MEDICINE

## 2021-04-27 PROCEDURE — 1036F TOBACCO NON-USER: CPT | Performed by: INTERNAL MEDICINE

## 2021-04-27 PROCEDURE — 99213 OFFICE O/P EST LOW 20 MIN: CPT | Performed by: INTERNAL MEDICINE

## 2021-04-27 PROCEDURE — 73590 X-RAY EXAM OF LOWER LEG: CPT

## 2021-04-27 PROCEDURE — 93971 EXTREMITY STUDY: CPT | Performed by: SURGERY

## 2021-04-27 PROCEDURE — 1160F RVW MEDS BY RX/DR IN RCRD: CPT | Performed by: INTERNAL MEDICINE

## 2021-04-27 NOTE — PROGRESS NOTES
Assessment/Plan:    Pain of right lower extremity   Rule out DVT rule out stress fracture rule out tendinitis will check stat venous Doppler an x-ray of the right lower extremity if negative will consider physical therapy for now rest, ice, compression and elevation  RTO as scheduled call if any problems         Problem List Items Addressed This Visit        Other    Pain of right lower extremity - Primary      Rule out DVT rule out stress fracture rule out tendinitis will check stat venous Doppler an x-ray of the right lower extremity if negative will consider physical therapy for now rest, ice, compression and elevation  RTO as scheduled call if any problems         Relevant Orders    VAS lower limb venous duplex study, unilateral/limited (Completed)    XR tibia fibula 2 vw right    Edema    Relevant Orders    VAS lower limb venous duplex study, unilateral/limited (Completed)    XR tibia fibula 2 vw right            Subjective:      Patient ID: Lonny Lyle is a 66 y o  male  HPI  64-year-old male chief complaint right lower extremity pain and mild swelling for the past several weeks;right lower ext pain no swelling , history of siatiac  2-3 weeks,  Discomfort as doing it  No change with back movement  Worse with walking  No nocturnal sx  Improves with rest , better in the am used aleve normally does walk indoors during the winter months he had this converted from indoor to outdoor walking he does walk 4-6 miles 7 days per week x4 years  The following portions of the patient's history were reviewed and updated as appropriate: allergies, current medications, past family history, past medical history, past social history, past surgical history and problem list     Review of Systems   Constitutional: Negative for activity change and unexpected weight change  Respiratory: Negative for cough and shortness of breath  Cardiovascular: Negative for chest pain and leg swelling     Gastrointestinal: Negative for abdominal pain, diarrhea, nausea and vomiting  Musculoskeletal:        Right distal leg pain         Objective:    No follow-ups on file  Procedure: Vas Lower Limb Venous Duplex Study, Unilateral/limited    Result Date: 4/27/2021  Narrative:  [facility]PORT [facility]: Indications: Physician wants to determine patency of the venous system secondary to right shin pain for 3 weeks  Operative History: BACK SURGERY X 2 SINUS SURGERY Tonsillectomy Risk Factors The patient has history of HTN, HLD and previous smoking (quit >10yrs ago)  He has no history of Obesity  CONCLUSION: Impression: RIGHT LOWER LIMB No evidence of acute or chronic deep vein thrombosis   No evidence of superficial thrombophlebitis noted  Doppler evaluation shows a normal response to augmentation maneuvers  Popliteal, posterior tibial and anterior tibial arterial Doppler waveforms are triphasic  LEFT LOWER LIMB LIMITED Evaluation shows no evidence of thrombus in the common femoral vein  Doppler evaluation shows a normal response to augmentation maneuvers  Technical findings were given to Dr Lisa Locke via tiger text  SIGNATURE: Electronically Signed by: Aki Mendez MD, RPVI on 2021-04-27 03:49:41 PM        No Known Allergies    Past Medical History:   Diagnosis Date    Hyperlipidemia     Hypertension      Past Surgical History:   Procedure Laterality Date    BACK SURGERY      HAND SURGERY      Incision Tendon Sheath Of a Finger    VT COLONOSCOPY FLX DX W/COLLJ SPEC WHEN PFRMD N/A 1/15/2019    Procedure: COLONOSCOPY;  Surgeon: Gustavo Obando MD;  [facility];   Service: Gastroenterology    SINUS SURGERY      TONSILLECTOMY AND ADENOIDECTOMY       Current Outpatient Medications on File Prior to Visit   Medication Sig Dispense Refill    amLODIPine (NORVASC) 5 mg tablet TAKE 1 TABLET DAILY 90 tablet 3    Ascorbic Acid (VITAMIN C) 500 MG CAPS Take 1 capsule by mouth daily      diclofenac sodium (VOLTAREN) 1 % Apply 2 g topically 4 (four) times a day 1 Tube 2    famotidine (PEPCID) 20 mg tablet TAKE 1 TABLET DAILY 90 tablet 0    metoprolol succinate (TOPROL-XL) 50 mg 24 hr tablet TAKE 1 TABLET DAILY 90 tablet 0    Multiple Vitamins-Minerals (MULTIVITAMIN ADULTS 50+ PO) Take 1 tablet by mouth daily      simvastatin (ZOCOR) 20 mg tablet TAKE 1 TABLET DAILY 90 tablet 0     No current facility-administered medications on file prior to visit  Family History   Problem Relation Age of Onset    Heart disease Mother     Colon cancer Father      Social History     Socioeconomic History    Marital status: /Civil Union     Spouse name: Not on file    Number of children: Not on file    Years of education: Not on file    Highest education level: Not on file   Occupational History    Not on file   Social Needs    Financial resource strain: Not on file    Food insecurity     Worry: Not on file     Inability: Not on file   [employer] needs     Medical: Not on file     Non-medical: Not on file   Tobacco Use    Smoking status: Former Smoker    Smokeless tobacco: Never Used   Substance and Sexual Activity    Alcohol use:  Yes     Alcohol/week: 1 0 standard drinks     Types: 1 Cans of beer per week     Comment: social     Drug use: No    Sexual activity: Not on file   Lifestyle    Physical activity     Days per week: Not on file     Minutes per session: Not on file    Stress: Not on file   Relationships    Social connections     Talks on phone: Not on file     Gets together: Not on file     Attends Sikhism service: Not on file     Active member of club or organization: Not on file     Attends meetings of clubs or organizations: Not on file     Relationship status: Not on file    Intimate partner violence     Fear of current or ex partner: Not on file     Emotionally abused: Not on file     Physically abused: Not on file     Forced sexual activity: Not on file   Other Topics Concern    Not on file   Social History Narrative    Advance directive on file, active         Vitals:    04/27/21 1138   BP: 132/72   Pulse: 64   Resp: 16   Height: 5' 8 5" (1 74 m)     Results for orders placed or performed in visit on 10/21/20   Lipid Panel with Direct LDL reflex   Result Value Ref Range    Total Cholesterol 172 <200 mg/dL    HDL 84 > OR = 40 mg/dL    Triglycerides 64 <150 mg/dL    LDL Calculated 74 mg/dL (calc)    Chol HDLC Ratio 2 0 <5 0 (calc)    Non-HDL Cholesterol 88 <130 mg/dL (calc)   Comprehensive metabolic panel   Result Value Ref Range    Glucose, Random 91 65 - 99 mg/dL    BUN 26 (H) 7 - 25 mg/dL    Creatinine 1 12 0 70 - 1 18 mg/dL    eGFR Non  65 > OR = 60 mL/min/1 73m2    eGFR  76 > OR = 60 mL/min/1 73m2    SL AMB BUN/CREATININE RATIO 23 (H) 6 - 22 (calc)    Sodium 139 135 - 146 mmol/L    Potassium 4 1 3 5 - 5 3 mmol/L    Chloride 101 98 - 110 mmol/L    CO2 27 20 - 32 mmol/L    Calcium 9 8 8 6 - 10 3 mg/dL    Protein, Total 7 0 6 1 - 8 1 g/dL    Albumin 4 4 3 6 - 5 1 g/dL    Globulin 2 6 1 9 - 3 7 g/dL (calc)    Albumin/Globulin Ratio 1 7 1 0 - 2 5 (calc)    TOTAL BILIRUBIN 0 7 0 2 - 1 2 mg/dL    Alkaline Phosphatase 73 35 - 144 U/L    AST 23 10 - 35 U/L    ALT 20 9 - 46 U/L   Hemoglobin A1c (w/out EAG)   Result Value Ref Range    Hemoglobin A1C 5 1 <5 7 % of total Hgb     Weight (last 2 days)     None        Body mass index is 24 27 kg/m²  BP      Temp      Pulse     Resp      SpO2      There were no vitals filed for this visit  There were no vitals filed for this visit  /72   Pulse 64   Resp 16   Ht 5' 8 5" (1 74 m)   BMI 24 27 kg/m²          Physical Exam  Constitutional:       General: He is not in acute distress  Appearance: He is well-developed  He is not diaphoretic  HENT:      Head: Normocephalic and atraumatic  Right Ear: External ear normal       Left Ear: External ear normal    Eyes:      General: No scleral icterus  Right eye: No discharge  Left eye: No discharge  Conjunctiva/sclera: Conjunctivae normal       Pupils: Pupils are equal, round, and reactive to light  Neck:      Musculoskeletal: Neck supple  Cardiovascular:      Rate and Rhythm: Normal rate and regular rhythm  Heart sounds: Normal heart sounds  No murmur  No friction rub  No gallop  Pulmonary:      Effort: No respiratory distress  Breath sounds: No wheezing or rales  Abdominal:      General: Bowel sounds are normal  There is no distension  Palpations: Abdomen is soft  There is no mass  Tenderness: There is no abdominal tenderness  There is no guarding or rebound  Musculoskeletal:         General: No deformity  Lymphadenopathy:      Cervical: No cervical adenopathy  Neurological:      Mental Status: He is alert           Examination of her right leg does show 1+ pitting edema no calf tenderness distal pulses 2/2 there is mild tenderness of the distil  Anterior tibialis tendon, Achilles intact, full range of motion of ankle no synovitis

## 2021-04-28 NOTE — ASSESSMENT & PLAN NOTE
Rule out DVT rule out stress fracture rule out tendinitis will check stat venous Doppler an x-ray of the right lower extremity if negative will consider physical therapy for now rest, ice, compression and elevation    RTO as scheduled call if any problems

## 2021-05-17 DIAGNOSIS — I10 ESSENTIAL HYPERTENSION: ICD-10-CM

## 2021-05-17 DIAGNOSIS — K21.9 GASTROESOPHAGEAL REFLUX DISEASE WITHOUT ESOPHAGITIS: ICD-10-CM

## 2021-05-17 DIAGNOSIS — E78.5 HYPERLIPIDEMIA, UNSPECIFIED HYPERLIPIDEMIA TYPE: ICD-10-CM

## 2021-05-17 RX ORDER — SIMVASTATIN 20 MG
TABLET ORAL
Qty: 90 TABLET | Refills: 0 | Status: SHIPPED | OUTPATIENT
Start: 2021-05-17 | End: 2021-08-09

## 2021-05-17 RX ORDER — METOPROLOL SUCCINATE 50 MG/1
TABLET, EXTENDED RELEASE ORAL
Qty: 90 TABLET | Refills: 0 | Status: SHIPPED | OUTPATIENT
Start: 2021-05-17 | End: 2021-08-09

## 2021-05-17 RX ORDER — FAMOTIDINE 20 MG/1
TABLET, FILM COATED ORAL
Qty: 90 TABLET | Refills: 0 | Status: SHIPPED | OUTPATIENT
Start: 2021-05-17 | End: 2021-08-09

## 2021-08-08 DIAGNOSIS — E78.5 HYPERLIPIDEMIA, UNSPECIFIED HYPERLIPIDEMIA TYPE: ICD-10-CM

## 2021-08-08 DIAGNOSIS — K21.9 GASTROESOPHAGEAL REFLUX DISEASE WITHOUT ESOPHAGITIS: ICD-10-CM

## 2021-08-08 DIAGNOSIS — I10 ESSENTIAL HYPERTENSION: ICD-10-CM

## 2021-08-09 RX ORDER — METOPROLOL SUCCINATE 50 MG/1
TABLET, EXTENDED RELEASE ORAL
Qty: 90 TABLET | Refills: 1 | Status: SHIPPED | OUTPATIENT
Start: 2021-08-09 | End: 2022-02-04

## 2021-08-09 RX ORDER — FAMOTIDINE 20 MG/1
TABLET, FILM COATED ORAL
Qty: 90 TABLET | Refills: 1 | Status: SHIPPED | OUTPATIENT
Start: 2021-08-09 | End: 2022-02-04

## 2021-08-09 RX ORDER — SIMVASTATIN 20 MG
TABLET ORAL
Qty: 90 TABLET | Refills: 1 | Status: SHIPPED | OUTPATIENT
Start: 2021-08-09 | End: 2022-02-04

## 2021-11-04 ENCOUNTER — OFFICE VISIT (OUTPATIENT)
Dept: INTERNAL MEDICINE CLINIC | Facility: CLINIC | Age: 71
End: 2021-11-04
Payer: COMMERCIAL

## 2021-11-04 VITALS
HEIGHT: 69 IN | DIASTOLIC BLOOD PRESSURE: 72 MMHG | SYSTOLIC BLOOD PRESSURE: 174 MMHG | WEIGHT: 160.6 LBS | HEART RATE: 60 BPM | BODY MASS INDEX: 23.79 KG/M2 | OXYGEN SATURATION: 99 %

## 2021-11-04 DIAGNOSIS — Z00.00 MEDICARE ANNUAL WELLNESS VISIT, SUBSEQUENT: Primary | ICD-10-CM

## 2021-11-04 DIAGNOSIS — Z13.6 SCREENING FOR CARDIOVASCULAR CONDITION: ICD-10-CM

## 2021-11-04 DIAGNOSIS — I10 PRIMARY HYPERTENSION: ICD-10-CM

## 2021-11-04 DIAGNOSIS — Z12.5 SCREENING PSA (PROSTATE SPECIFIC ANTIGEN): ICD-10-CM

## 2021-11-04 DIAGNOSIS — Z87.898 HISTORY OF PREDIABETES: ICD-10-CM

## 2021-11-04 PROCEDURE — 1036F TOBACCO NON-USER: CPT | Performed by: INTERNAL MEDICINE

## 2021-11-04 PROCEDURE — 3008F BODY MASS INDEX DOCD: CPT | Performed by: INTERNAL MEDICINE

## 2021-11-04 PROCEDURE — 3288F FALL RISK ASSESSMENT DOCD: CPT | Performed by: INTERNAL MEDICINE

## 2021-11-04 PROCEDURE — 1160F RVW MEDS BY RX/DR IN RCRD: CPT | Performed by: INTERNAL MEDICINE

## 2021-11-04 PROCEDURE — 3725F SCREEN DEPRESSION PERFORMED: CPT | Performed by: INTERNAL MEDICINE

## 2021-11-04 PROCEDURE — 1170F FXNL STATUS ASSESSED: CPT | Performed by: INTERNAL MEDICINE

## 2021-11-04 PROCEDURE — G0439 PPPS, SUBSEQ VISIT: HCPCS | Performed by: INTERNAL MEDICINE

## 2021-11-04 PROCEDURE — 1125F AMNT PAIN NOTED PAIN PRSNT: CPT | Performed by: INTERNAL MEDICINE

## 2021-11-09 LAB
ALBUMIN SERPL-MCNC: 4.3 G/DL (ref 3.6–5.1)
ALBUMIN/GLOB SERPL: 1.7 (CALC) (ref 1–2.5)
ALP SERPL-CCNC: 76 U/L (ref 35–144)
ALT SERPL-CCNC: 18 U/L (ref 9–46)
AST SERPL-CCNC: 24 U/L (ref 10–35)
BASOPHILS # BLD AUTO: 50 CELLS/UL (ref 0–200)
BASOPHILS NFR BLD AUTO: 0.8 %
BILIRUB SERPL-MCNC: 0.6 MG/DL (ref 0.2–1.2)
BUN SERPL-MCNC: 20 MG/DL (ref 7–25)
BUN/CREAT SERPL: ABNORMAL (CALC) (ref 6–22)
CALCIUM SERPL-MCNC: 9.7 MG/DL (ref 8.6–10.3)
CHLORIDE SERPL-SCNC: 100 MMOL/L (ref 98–110)
CHOLEST SERPL-MCNC: 169 MG/DL
CHOLEST/HDLC SERPL: 2.2 (CALC)
CO2 SERPL-SCNC: 29 MMOL/L (ref 20–32)
CREAT SERPL-MCNC: 0.87 MG/DL (ref 0.7–1.18)
EOSINOPHIL # BLD AUTO: 112 CELLS/UL (ref 15–500)
EOSINOPHIL NFR BLD AUTO: 1.8 %
ERYTHROCYTE [DISTWIDTH] IN BLOOD BY AUTOMATED COUNT: 12 % (ref 11–15)
GLOBULIN SER CALC-MCNC: 2.5 G/DL (CALC) (ref 1.9–3.7)
GLUCOSE SERPL-MCNC: 104 MG/DL (ref 65–99)
HBA1C MFR BLD: 5.1 % OF TOTAL HGB
HCT VFR BLD AUTO: 42.8 % (ref 38.5–50)
HDLC SERPL-MCNC: 78 MG/DL
HGB BLD-MCNC: 14.9 G/DL (ref 13.2–17.1)
LDLC SERPL CALC-MCNC: 76 MG/DL (CALC)
LYMPHOCYTES # BLD AUTO: 1748 CELLS/UL (ref 850–3900)
LYMPHOCYTES NFR BLD AUTO: 28.2 %
MCH RBC QN AUTO: 33 PG (ref 27–33)
MCHC RBC AUTO-ENTMCNC: 34.8 G/DL (ref 32–36)
MCV RBC AUTO: 94.7 FL (ref 80–100)
MONOCYTES # BLD AUTO: 756 CELLS/UL (ref 200–950)
MONOCYTES NFR BLD AUTO: 12.2 %
NEUTROPHILS # BLD AUTO: 3534 CELLS/UL (ref 1500–7800)
NEUTROPHILS NFR BLD AUTO: 57 %
NONHDLC SERPL-MCNC: 91 MG/DL (CALC)
PLATELET # BLD AUTO: 221 THOUSAND/UL (ref 140–400)
PMV BLD REES-ECKER: 11.4 FL (ref 7.5–12.5)
POTASSIUM SERPL-SCNC: 4.7 MMOL/L (ref 3.5–5.3)
PROT SERPL-MCNC: 6.8 G/DL (ref 6.1–8.1)
PSA SERPL-MCNC: 1.3 NG/ML
RBC # BLD AUTO: 4.52 MILLION/UL (ref 4.2–5.8)
SL AMB EGFR AFRICAN AMERICAN: 99 ML/MIN/1.73M2
SL AMB EGFR NON AFRICAN AMERICAN: 86 ML/MIN/1.73M2
SODIUM SERPL-SCNC: 137 MMOL/L (ref 135–146)
TRIGL SERPL-MCNC: 70 MG/DL
WBC # BLD AUTO: 6.2 THOUSAND/UL (ref 3.8–10.8)

## 2021-12-02 ENCOUNTER — CLINICAL SUPPORT (OUTPATIENT)
Dept: INTERNAL MEDICINE CLINIC | Facility: CLINIC | Age: 71
End: 2021-12-02

## 2021-12-02 DIAGNOSIS — I10 PRIMARY HYPERTENSION: Primary | ICD-10-CM

## 2021-12-21 DIAGNOSIS — I10 ESSENTIAL HYPERTENSION: ICD-10-CM

## 2021-12-21 RX ORDER — AMLODIPINE BESYLATE 5 MG/1
TABLET ORAL
Qty: 90 TABLET | Refills: 3 | Status: SHIPPED | OUTPATIENT
Start: 2021-12-21 | End: 2021-12-21

## 2021-12-21 RX ORDER — AMLODIPINE BESYLATE 5 MG/1
5 TABLET ORAL DAILY
Qty: 90 TABLET | Refills: 0 | Status: SHIPPED | OUTPATIENT
Start: 2021-12-21 | End: 2022-03-21

## 2022-02-04 DIAGNOSIS — I10 ESSENTIAL HYPERTENSION: ICD-10-CM

## 2022-02-04 DIAGNOSIS — K21.9 GASTROESOPHAGEAL REFLUX DISEASE WITHOUT ESOPHAGITIS: ICD-10-CM

## 2022-02-04 DIAGNOSIS — E78.5 HYPERLIPIDEMIA, UNSPECIFIED HYPERLIPIDEMIA TYPE: ICD-10-CM

## 2022-02-04 RX ORDER — FAMOTIDINE 20 MG/1
TABLET, FILM COATED ORAL
Qty: 90 TABLET | Refills: 1 | Status: SHIPPED | OUTPATIENT
Start: 2022-02-04 | End: 2022-08-03

## 2022-02-04 RX ORDER — METOPROLOL SUCCINATE 50 MG/1
TABLET, EXTENDED RELEASE ORAL
Qty: 90 TABLET | Refills: 1 | Status: SHIPPED | OUTPATIENT
Start: 2022-02-04 | End: 2022-08-03

## 2022-02-04 RX ORDER — SIMVASTATIN 20 MG
TABLET ORAL
Qty: 90 TABLET | Refills: 1 | Status: SHIPPED | OUTPATIENT
Start: 2022-02-04 | End: 2022-08-03

## 2022-03-21 DIAGNOSIS — I10 ESSENTIAL HYPERTENSION: ICD-10-CM

## 2022-03-21 RX ORDER — AMLODIPINE BESYLATE 5 MG/1
TABLET ORAL
Qty: 90 TABLET | Refills: 0 | Status: SHIPPED | OUTPATIENT
Start: 2022-03-21 | End: 2022-06-19

## 2022-08-03 DIAGNOSIS — I10 ESSENTIAL HYPERTENSION: ICD-10-CM

## 2022-08-03 DIAGNOSIS — E78.5 HYPERLIPIDEMIA, UNSPECIFIED HYPERLIPIDEMIA TYPE: ICD-10-CM

## 2022-08-03 DIAGNOSIS — K21.9 GASTROESOPHAGEAL REFLUX DISEASE WITHOUT ESOPHAGITIS: ICD-10-CM

## 2022-08-03 RX ORDER — METOPROLOL SUCCINATE 50 MG/1
TABLET, EXTENDED RELEASE ORAL
Qty: 90 TABLET | Refills: 1 | Status: SHIPPED | OUTPATIENT
Start: 2022-08-03

## 2022-08-03 RX ORDER — SIMVASTATIN 20 MG
TABLET ORAL
Qty: 90 TABLET | Refills: 1 | Status: SHIPPED | OUTPATIENT
Start: 2022-08-03

## 2022-08-03 RX ORDER — FAMOTIDINE 20 MG/1
TABLET, FILM COATED ORAL
Qty: 90 TABLET | Refills: 1 | Status: SHIPPED | OUTPATIENT
Start: 2022-08-03

## 2022-08-11 ENCOUNTER — ESTABLISHED COMPREHENSIVE EXAM (OUTPATIENT)
Dept: URBAN - METROPOLITAN AREA CLINIC 6 | Facility: CLINIC | Age: 72
End: 2022-08-11

## 2022-08-11 DIAGNOSIS — H43.812: ICD-10-CM

## 2022-08-11 DIAGNOSIS — H25.813: ICD-10-CM

## 2022-08-11 DIAGNOSIS — H52.13: ICD-10-CM

## 2022-08-11 DIAGNOSIS — H35.30: ICD-10-CM

## 2022-08-11 PROCEDURE — 92015 DETERMINE REFRACTIVE STATE: CPT

## 2022-08-11 PROCEDURE — 92004 COMPRE OPH EXAM NEW PT 1/>: CPT

## 2022-08-11 ASSESSMENT — VISUAL ACUITY
OD_CC: 20/30
OU_CC: J1+
OS_CC: 20/30

## 2022-08-11 ASSESSMENT — TONOMETRY
OS_IOP_MMHG: 19
OD_IOP_MMHG: 21

## 2022-10-12 PROBLEM — Z13.6 SCREENING FOR CARDIOVASCULAR CONDITION: Status: RESOLVED | Noted: 2021-11-04 | Resolved: 2022-10-12

## 2022-11-09 ENCOUNTER — OFFICE VISIT (OUTPATIENT)
Dept: INTERNAL MEDICINE CLINIC | Facility: CLINIC | Age: 72
End: 2022-11-09

## 2022-11-09 VITALS
HEIGHT: 68 IN | DIASTOLIC BLOOD PRESSURE: 98 MMHG | BODY MASS INDEX: 24.98 KG/M2 | HEART RATE: 80 BPM | OXYGEN SATURATION: 98 % | SYSTOLIC BLOOD PRESSURE: 178 MMHG | WEIGHT: 164.8 LBS

## 2022-11-09 DIAGNOSIS — E78.5 HYPERLIPIDEMIA, UNSPECIFIED HYPERLIPIDEMIA TYPE: ICD-10-CM

## 2022-11-09 DIAGNOSIS — R73.9 ELEVATED BLOOD SUGAR: ICD-10-CM

## 2022-11-09 DIAGNOSIS — I10 PRIMARY HYPERTENSION: ICD-10-CM

## 2022-11-09 DIAGNOSIS — H61.20 WAX IN EAR: ICD-10-CM

## 2022-11-09 DIAGNOSIS — Z00.00 HEALTHCARE MAINTENANCE: ICD-10-CM

## 2022-11-09 DIAGNOSIS — Z00.00 MEDICARE ANNUAL WELLNESS VISIT, SUBSEQUENT: Primary | ICD-10-CM

## 2022-11-09 DIAGNOSIS — Z12.5 SCREENING PSA (PROSTATE SPECIFIC ANTIGEN): ICD-10-CM

## 2022-11-09 NOTE — PATIENT INSTRUCTIONS
Medicare Preventive Visit Patient Instructions  Thank you for completing your Welcome to Medicare Visit or Medicare Annual Wellness Visit today  Your next wellness visit will be due in one year (11/10/2023)  The screening/preventive services that you may require over the next 5-10 years are detailed below  Some tests may not apply to you based off risk factors and/or age  Screening tests ordered at today's visit but not completed yet may show as past due  Also, please note that scanned in results may not display below  Preventive Screenings:  Service Recommendations Previous Testing/Comments   Colorectal Cancer Screening  · Colonoscopy    · Fecal Occult Blood Test (FOBT)/Fecal Immunochemical Test (FIT)  · Fecal DNA/Cologuard Test  · Flexible Sigmoidoscopy Age: 39-70 years old   Colonoscopy: every 10 years (May be performed more frequently if at higher risk)  OR  FOBT/FIT: every 1 year  OR  Cologuard: every 3 years  OR  Sigmoidoscopy: every 5 years  Screening may be recommended earlier than age 39 if at higher risk for colorectal cancer  Also, an individualized decision between you and your healthcare provider will decide whether screening between the ages of 74-80 would be appropriate   Colonoscopy: 01/15/2019  FOBT/FIT: Not on file  Cologuard: Not on file  Sigmoidoscopy: Not on file          Prostate Cancer Screening Individualized decision between patient and health care provider in men between ages of 53-78   Medicare will cover every 12 months beginning on the day after your 50th birthday PSA: 1 30 ng/mL           Hepatitis C Screening Once for adults born between 1945 and 1965  More frequently in patients at high risk for Hepatitis C Hep C Antibody: 09/21/2017        Diabetes Screening 1-2 times per year if you're at risk for diabetes or have pre-diabetes Fasting glucose: No results in last 5 years (No results in last 5 years)  A1C: 5 1 % of total Hgb (11/9/2021)      Cholesterol Screening Once every 5 years if you don't have a lipid disorder  May order more often based on risk factors  Lipid panel: 11/09/2021         Other Preventive Screenings Covered by Medicare:  1  Abdominal Aortic Aneurysm (AAA) Screening: covered once if your at risk  You're considered to be at risk if you have a family history of AAA or a male between the age of 73-68 who smoking at least 100 cigarettes in your lifetime  2  Lung Cancer Screening: covers low dose CT scan once per year if you meet all of the following conditions: (1) Age 50-69; (2) No signs or symptoms of lung cancer; (3) Current smoker or have quit smoking within the last 15 years; (4) You have a tobacco smoking history of at least 20 pack years (packs per day x number of years you smoked); (5) You get a written order from a healthcare provider  3  Glaucoma Screening: covered annually if you're considered high risk: (1) You have diabetes OR (2) Family history of glaucoma OR (3)  aged 48 and older OR (3)  American aged 72 and older  3  Osteoporosis Screening: covered every 2 years if you meet one of the following conditions: (1) Have a vertebral abnormality; (2) On glucocorticoid therapy for more than 3 months; (3) Have primary hyperparathyroidism; (4) On osteoporosis medications and need to assess response to drug therapy  5  HIV Screening: covered annually if you're between the age of 12-76  Also covered annually if you are younger than 13 and older than 72 with risk factors for HIV infection  For pregnant patients, it is covered up to 3 times per pregnancy      Immunizations:  Immunization Recommendations   Influenza Vaccine Annual influenza vaccination during flu season is recommended for all persons aged >= 6 months who do not have contraindications   Pneumococcal Vaccine   * Pneumococcal conjugate vaccine = PCV13 (Prevnar 13), PCV15 (Vaxneuvance), PCV20 (Prevnar 20)  * Pneumococcal polysaccharide vaccine = PPSV23 (Pneumovax) Adults 25-60 years old: 1-3 doses may be recommended based on certain risk factors  Adults 72 years old: 1-2 doses may be recommended based off what pneumonia vaccine you previously received   Hepatitis B Vaccine 3 dose series if at intermediate or high risk (ex: diabetes, end stage renal disease, liver disease)   Tetanus (Td) Vaccine - COST NOT COVERED BY MEDICARE PART B Following completion of primary series, a booster dose should be given every 10 years to maintain immunity against tetanus  Td may also be given as tetanus wound prophylaxis  Tdap Vaccine - COST NOT COVERED BY MEDICARE PART B Recommended at least once for all adults  For pregnant patients, recommended with each pregnancy  Shingles Vaccine (Shingrix) - COST NOT COVERED BY MEDICARE PART B  2 shot series recommended in those aged 48 and above     Health Maintenance Due:      Topic Date Due   • Colorectal Cancer Screening  01/15/2024   • Hepatitis C Screening  Completed     Immunizations Due:  There are no preventive care reminders to display for this patient  Advance Directives   What are advance directives? Advance directives are legal documents that state your wishes and plans for medical care  These plans are made ahead of time in case you lose your ability to make decisions for yourself  Advance directives can apply to any medical decision, such as the treatments you want, and if you want to donate organs  What are the types of advance directives? There are many types of advance directives, and each state has rules about how to use them  You may choose a combination of any of the following:  · Living will: This is a written record of the treatment you want  You can also choose which treatments you do not want, which to limit, and which to stop at a certain time  This includes surgery, medicine, IV fluid, and tube feedings  · Durable power of  for healthcare [facility]):   This is a written record that states who you want to make healthcare choices for you when you are unable to make them for yourself  This person, called a proxy, is usually a family member or a friend  You may choose more than 1 proxy  · Do not resuscitate (DNR) order:  A DNR order is used in case your heart stops beating or you stop breathing  It is a request not to have certain forms of treatment, such as CPR  A DNR order may be included in other types of advance directives  · Medical directive: This covers the care that you want if you are in a coma, near death, or unable to make decisions for yourself  You can list the treatments you want for each condition  Treatment may include pain medicine, surgery, blood transfusions, dialysis, IV or tube feedings, and a ventilator (breathing machine)  · Values history: This document has questions about your views, beliefs, and how you feel and think about life  This information can help others choose the care that you would choose  Why are advance directives important? An advance directive helps you control your care  Although spoken wishes may be used, it is better to have your wishes written down  Spoken wishes can be misunderstood, or not followed  Treatments may be given even if you do not want them  An advance directive may make it easier for your family to make difficult choices about your care  Weight Management   Why it is important to manage your weight:  Being overweight increases your risk of health conditions such as heart disease, high blood pressure, type 2 diabetes, and certain types of cancer  It can also increase your risk for osteoarthritis, sleep apnea, and other respiratory problems  Aim for a slow, steady weight loss  Even a small amount of weight loss can lower your risk of health problems  How to lose weight safely:  A safe and healthy way to lose weight is to eat fewer calories and get regular exercise  You can lose up about 1 pound a week by decreasing the number of calories you eat by 500 calories each day     Healthy meal plan for weight management:  A healthy meal plan includes a variety of foods, contains fewer calories, and helps you stay healthy  A healthy meal plan includes the following:  · Eat whole-grain foods more often  A healthy meal plan should contain fiber  Fiber is the part of grains, fruits, and vegetables that is not broken down by your body  Whole-grain foods are healthy and provide extra fiber in your diet  Some examples of whole-grain foods are whole-wheat breads and pastas, oatmeal, brown rice, and bulgur  · Eat a variety of vegetables every day  Include dark, leafy greens such as spinach, kale, cierra greens, and mustard greens  Eat yellow and orange vegetables such as carrots, sweet potatoes, and winter squash  · Eat a variety of fruits every day  Choose fresh or canned fruit (canned in its own juice or light syrup) instead of juice  Fruit juice has very little or no fiber  · Eat low-fat dairy foods  Drink fat-free (skim) milk or 1% milk  Eat fat-free yogurt and low-fat cottage cheese  Try low-fat cheeses such as mozzarella and other reduced-fat cheeses  · Choose meat and other protein foods that are low in fat  Choose beans or other legumes such as split peas or lentils  Choose fish, skinless poultry (chicken or turkey), or lean cuts of red meat (beef or pork)  Before you cook meat or poultry, cut off any visible fat  · Use less fat and oil  Try baking foods instead of frying them  Add less fat, such as margarine, sour cream, regular salad dressing and mayonnaise to foods  Eat fewer high-fat foods  Some examples of high-fat foods include french fries, doughnuts, ice cream, and cakes  · Eat fewer sweets  Limit foods and drinks that are high in sugar  This includes candy, cookies, regular soda, and sweetened drinks  Exercise:  Exercise at least 30 minutes per day on most days of the week  Some examples of exercise include walking, biking, dancing, and swimming   You can also fit in more physical activity by taking the stairs instead of the elevator or parking farther away from stores  Ask your healthcare provider about the best exercise plan for you  © Copyright [employer] 2018 Information is for End User's use only and may not be sold, redistributed or otherwise used for commercial purposes   All illustrations and images included in CareNotes® are the copyrighted property of A D A M , Inc  or [address] [place] [address] [employer]

## 2022-11-10 PROBLEM — H61.20 WAX IN EAR: Status: ACTIVE | Noted: 2022-11-10

## 2022-11-10 NOTE — ASSESSMENT & PLAN NOTE
Assessment and plan 1  Medicare subsequent annual wellness examination overall the patient is clinically stable and doing well, we encouraged the patient to follow a healthy and balanced diet  We recommend that the patient exercise routinely approximately 30 minutes 5 times per week   We have reviewed the patient's vaccines and have made recommendations for updates if necessary  annual flu shot, COVID booster consider      We will be ordering screening laboratories which are age appropriate  Return to the office in  1 year    call if any problems

## 2022-11-10 NOTE — ASSESSMENT & PLAN NOTE
Hypertension - controlled, I have counseled patient following healthy balance diet, I would like the patient reduce sodium, exercise routinely, I would like the patient continued the med current medical regiment and we will continue to monitor    Patient does have element of white coat hypertension superimposed with essential hypertension currently the home readings are very stable will continue with amlodipine 5 mg once daily, Toprol XL 50 mg once daily

## 2022-11-11 LAB
ALBUMIN SERPL-MCNC: 4.5 G/DL (ref 3.6–5.1)
ALBUMIN/GLOB SERPL: 2 (CALC) (ref 1–2.5)
ALP SERPL-CCNC: 76 U/L (ref 35–144)
ALT SERPL-CCNC: 26 U/L (ref 9–46)
AST SERPL-CCNC: 26 U/L (ref 10–35)
BASOPHILS # BLD AUTO: 41 CELLS/UL (ref 0–200)
BASOPHILS NFR BLD AUTO: 0.7 %
BILIRUB SERPL-MCNC: 0.7 MG/DL (ref 0.2–1.2)
BUN SERPL-MCNC: 17 MG/DL (ref 7–25)
BUN/CREAT SERPL: ABNORMAL (CALC) (ref 6–22)
CALCIUM SERPL-MCNC: 9.5 MG/DL (ref 8.6–10.3)
CHLORIDE SERPL-SCNC: 100 MMOL/L (ref 98–110)
CHOLEST SERPL-MCNC: 180 MG/DL
CHOLEST/HDLC SERPL: 2.2 (CALC)
CO2 SERPL-SCNC: 29 MMOL/L (ref 20–32)
CREAT SERPL-MCNC: 1.05 MG/DL (ref 0.7–1.28)
EOSINOPHIL # BLD AUTO: 100 CELLS/UL (ref 15–500)
EOSINOPHIL NFR BLD AUTO: 1.7 %
ERYTHROCYTE [DISTWIDTH] IN BLOOD BY AUTOMATED COUNT: 12.2 % (ref 11–15)
GFR/BSA.PRED SERPLBLD CYS-BASED-ARV: 74 ML/MIN/1.73M2
GLOBULIN SER CALC-MCNC: 2.3 G/DL (CALC) (ref 1.9–3.7)
GLUCOSE SERPL-MCNC: 102 MG/DL (ref 65–99)
HBA1C MFR BLD: 5.2 % OF TOTAL HGB
HCT VFR BLD AUTO: 44.1 % (ref 38.5–50)
HDLC SERPL-MCNC: 83 MG/DL
HGB BLD-MCNC: 14.8 G/DL (ref 13.2–17.1)
LDLC SERPL CALC-MCNC: 82 MG/DL (CALC)
LYMPHOCYTES # BLD AUTO: 1711 CELLS/UL (ref 850–3900)
LYMPHOCYTES NFR BLD AUTO: 29 %
MCH RBC QN AUTO: 32.1 PG (ref 27–33)
MCHC RBC AUTO-ENTMCNC: 33.6 G/DL (ref 32–36)
MCV RBC AUTO: 95.7 FL (ref 80–100)
MONOCYTES # BLD AUTO: 791 CELLS/UL (ref 200–950)
MONOCYTES NFR BLD AUTO: 13.4 %
NEUTROPHILS # BLD AUTO: 3257 CELLS/UL (ref 1500–7800)
NEUTROPHILS NFR BLD AUTO: 55.2 %
NONHDLC SERPL-MCNC: 97 MG/DL (CALC)
PLATELET # BLD AUTO: 215 THOUSAND/UL (ref 140–400)
PMV BLD REES-ECKER: 11.2 FL (ref 7.5–12.5)
POTASSIUM SERPL-SCNC: 4 MMOL/L (ref 3.5–5.3)
PROT SERPL-MCNC: 6.8 G/DL (ref 6.1–8.1)
PSA SERPL-MCNC: 1.22 NG/ML
RBC # BLD AUTO: 4.61 MILLION/UL (ref 4.2–5.8)
SODIUM SERPL-SCNC: 137 MMOL/L (ref 135–146)
TRIGL SERPL-MCNC: 72 MG/DL
WBC # BLD AUTO: 5.9 THOUSAND/UL (ref 3.8–10.8)

## 2022-11-16 ENCOUNTER — OFFICE VISIT (OUTPATIENT)
Dept: OTOLARYNGOLOGY | Facility: CLINIC | Age: 72
End: 2022-11-16

## 2022-11-16 VITALS — WEIGHT: 162 LBS | BODY MASS INDEX: 24.55 KG/M2 | HEIGHT: 68 IN | TEMPERATURE: 97.3 F

## 2022-11-16 DIAGNOSIS — H61.20 WAX IN EAR: ICD-10-CM

## 2022-11-16 DIAGNOSIS — H61.23 BILATERAL IMPACTED CERUMEN: Primary | ICD-10-CM

## 2022-11-16 NOTE — ASSESSMENT & PLAN NOTE
On exam noted bilateral cerumen impaction and unable to fully view tympanic membrane  Cerumen impaction removed bilateral eac with #5 suction, pt tolerated procedure well  Upon removal, improved hearing and decreased clogged sensation of bilateral ears  Discussed routine cerumen care including avoidance of q-tips and cerumen softeners  Hydrocortisone cream pea sized amount on finger as needed for itching in ears  Encourage ongoing follow up prn to monitor for cerumen and hearing  Audiogram if symptoms worsen

## 2022-11-16 NOTE — PROGRESS NOTES
Assessment/Plan:    Bilateral impacted cerumen    On exam noted bilateral cerumen impaction and unable to fully view tympanic membrane  Cerumen impaction removed bilateral eac with #5 suction, pt tolerated procedure well  Upon removal, improved hearing and decreased clogged sensation of bilateral ears  Discussed routine cerumen care including avoidance of q-tips and cerumen softeners  Hydrocortisone cream pea sized amount on finger as needed for itching in ears  Encourage ongoing follow up prn to monitor for cerumen and hearing  Audiogram if symptoms worsen  Diagnoses and all orders for this visit:    Bilateral impacted cerumen    Wax in ear  -     Ambulatory Referral to Otolaryngology    Other orders  -     Ear cerumen removal          Subjective:      Patient ID: Feng Foster is a 74 y o  male  Presents today as a new patient due to ear concerns  Hearing gradually worsening  Bilateral ears feel blocked  Left ear worse than right  No tinnitus  No otalgia or otorrhea  No history of ear surgery  No current hearing aids  The following portions of the patient's history were reviewed and updated as appropriate: allergies, current medications, past family history, past medical history, past social history, past surgical history and problem list     Review of Systems   Constitutional: Negative  HENT: Negative for congestion, ear discharge, ear pain, hearing loss, nosebleeds, postnasal drip, rhinorrhea, sinus pressure, sinus pain, sore throat, tinnitus and voice change  Eyes: Negative  Respiratory: Negative for chest tightness and shortness of breath  Cardiovascular: Negative  Gastrointestinal: Negative  Endocrine: Negative  Musculoskeletal: Negative  Skin: Negative for color change  Neurological: Negative for dizziness, numbness and headaches  Psychiatric/Behavioral: Negative            Objective:      Temp (!) 97 3 °F (36 3 °C) (Temporal)   Ht 5' 8" (1 727 m) Wt 73 5 kg (162 lb)   BMI 24 63 kg/m²          Physical Exam  Constitutional:       Appearance: He is well-developed and well-nourished  HENT:      Head: Normocephalic  Right Ear: Hearing, tympanic membrane, ear canal and external ear normal  No decreased hearing noted  No drainage or tenderness  There is impacted cerumen  Tympanic membrane is not perforated or erythematous  Left Ear: Hearing, tympanic membrane, ear canal and external ear normal  No decreased hearing noted  No drainage or tenderness  There is impacted cerumen  Tympanic membrane is not perforated or erythematous  Nose: Nose normal  No nasal deformity, septal deviation or sinus tenderness  Mouth/Throat:      Mouth: Oropharynx is clear and moist and mucous membranes are normal  Mucous membranes are not pale and not dry  No oral lesions  Dentition: Normal dentition  Pharynx: Uvula midline  No oropharyngeal exudate  Neck:      Trachea: No tracheal deviation  Cardiovascular:      Rate and Rhythm: Normal rate  Pulmonary:      Effort: Pulmonary effort is normal  No accessory muscle usage or respiratory distress  Musculoskeletal:      Right shoulder: Normal range of motion  Cervical back: Full passive range of motion without pain, normal range of motion and neck supple  Lymphadenopathy:      Cervical: No cervical adenopathy  Skin:     General: Skin is warm, dry and intact  Neurological:      Mental Status: He is alert and oriented to person, place, and time  Cranial Nerves: No cranial nerve deficit  Sensory: No sensory deficit  Psychiatric:         Mood and Affect: Mood and affect normal          Behavior: Behavior is cooperative  Ear cerumen removal    Date/Time: 11/16/2022 9:10 AM  Performed by: LYDIA White  Authorized by: LYDIA White   Universal Protocol:  Consent: Verbal consent obtained    Risks and benefits: risks, benefits and alternatives were discussed  Consent given by: patient  Patient understanding: patient states understanding of the procedure being performed      Patient location:  Clinic  Procedure details:     Local anesthetic:  None    Location:  L ear and R ear    Approach:  External  Post-procedure details:     Complication:  None    Hearing quality:  Normal    Patient tolerance of procedure:   Tolerated well, no immediate complications

## 2022-12-09 DIAGNOSIS — I10 ESSENTIAL HYPERTENSION: ICD-10-CM

## 2022-12-09 RX ORDER — AMLODIPINE BESYLATE 5 MG/1
TABLET ORAL
Qty: 90 TABLET | Refills: 0 | Status: SHIPPED | OUTPATIENT
Start: 2022-12-09

## 2023-01-09 NOTE — PROGRESS NOTES
Assessment and Plan:     Problem List Items Addressed This Visit        Cardiovascular and Mediastinum    Hypertension     Hypertension - controlled, I have counseled patient following healthy balance diet, I would like the patient reduce sodium, exercise routinely, I would like the patient continued the med current medical regiment and we will continue to monitor  Patient does have element of white coat hypertension superimposed with essential hypertension currently the home readings are very stable will continue with amlodipine 5 mg once daily, Toprol XL 50 mg once daily            Other    Medicare annual wellness visit, subsequent - Primary     Assessment and plan 1  Medicare subsequent annual wellness examination overall the patient is clinically stable and doing well, we encouraged the patient to follow a healthy and balanced diet  We recommend that the patient exercise routinely approximately 30 minutes 5 times per week   We have reviewed the patient's vaccines and have made recommendations for updates if necessary  annual flu shot, COVID booster consider      We will be ordering screening laboratories which are age appropriate  Return to the office in  1 year    call if any problems  Elevated blood sugar     Reduce carbohydrates/sweets continue monitor fasting blood sugar and hemoglobin A1c         Relevant Orders    Comprehensive metabolic panel    Hemoglobin A1C    Hyperlipidemia     Hyperlipidemia controlled continue with current medical regiment recommend a low-cholesterol diet and recommend routine exercise we will continue to monitor the progress  Continue Zocor 20 mg once daily         Relevant Orders    Lipid Panel with Direct LDL reflex    Screening PSA (prostate specific antigen)    Relevant Orders    PSA, Total Screen    Wax in ear     Recommend Debrox as directed p r n           Relevant Orders    Ambulatory Referral to Otolaryngology      Other Visit Diagnoses     Healthcare maintenance        Relevant Orders    CBC (Includes Diff/Plt) (Refl)      RTO in 12 months call if any problems  BMI Counseling: Body mass index is 25 06 kg/m²  The BMI is above normal  Nutrition recommendations include decreasing portion sizes and moderation in carbohydrate intake  Exercise recommendations include exercising 3-5 times per week  Rationale for BMI follow-up plan is due to patient being overweight or obese  Depression Screening and Follow-up Plan: Patient was screened for depression during today's encounter  They screened negative with a PHQ-2 score of 0  Preventive health issues were discussed with patient, and age appropriate screening tests were ordered as noted in patient's After Visit Summary  Personalized health advice and appropriate referrals for health education or preventive services given if needed, as noted in patient's After Visit Summary  History of Present Illness:     Patient presents for a Medicare Wellness Visit    HPI 69-year old male coming in for a follow up visit regarding wax in the ear, elevated blood sugar, hyperlipidemia and primary hypertension; The patient reports me compliant taking medications without untoward side effects the  The patient is here to review his medical condition, update me on the medical condition and the patient reports me no hospitalizations and no ER visits  No injuries no illnesses trying to follow healthy/balance diet remains active here to review laboratories  things prior to coming in he has been checking over last 2 weeks have been in the 115-130 range with the average in the 120s over 60s to 70s with pulse rate of 60s on average  Patient Care Team:  Olinda Dela Cruz DO as PCP - MD Lobito Patel MD as Endoscopist     Review of Systems:     Review of Systems   Constitutional: Negative for activity change, appetite change and unexpected weight change     HENT: Negative for congestion  Eyes: Negative for visual disturbance  Respiratory: Negative for cough and shortness of breath  Cardiovascular: Negative for chest pain  Gastrointestinal: Negative for abdominal pain, diarrhea, nausea and vomiting  Neurological: Negative for dizziness, light-headedness and headaches  Problem List:     Patient Active Problem List   Diagnosis   • Medicare annual wellness visit, subsequent   • Gastroesophageal reflux disease without esophagitis   • Elevated blood sugar   • Screening for colon cancer   • Crepitus of right shoulder joint   • Pain of left thumb   • Hyperlipidemia   • Chronic pain in right shoulder   • Thumb pain, left   • Hypertension   • History of prediabetes   • Screening PSA (prostate specific antigen)   • Neck pain   • Pain of right lower extremity   • Edema   • Wax in ear      Past Medical and Surgical History:     Past Medical History:   Diagnosis Date   • Arthritis Unknown   • GERD (gastroesophageal reflux disease) Jan 1995   • Hyperlipidemia    • Hypertension    • Shingles Jan 1967     Past Surgical History:   Procedure Laterality Date   • BACK SURGERY     • HAND SURGERY      Incision Tendon Sheath Of a Finger   • TN COLONOSCOPY FLX DX W/COLLJ SPEC WHEN PFRMD N/A 01/15/2019    Procedure: COLONOSCOPY;  Surgeon: Kathy Ramirez MD;  [facility];   Service: Gastroenterology   • SINUS SURGERY     • [id number] [facility], 2010   • TONSILLECTOMY AND ADENOIDECTOMY        Family History:     Family History   Problem Relation Age of Onset   • Heart disease Mother    • Alcohol abuse Mother    • Colon cancer Father       Social History:     Social History     Socioeconomic History   • Marital status: /Civil Union     Spouse name: None   • Number of children: None   • Years of education: None   • Highest education level: None   Occupational History   • None   Tobacco Use   • Smoking status: Former Smoker     Packs/day: 0 50     Years: 10 00     Pack years: 5 00     Types: Cigarettes     Quit date: 1984     Years since quittin 8   • Smokeless tobacco: Never Used   Vaping Use   • Vaping Use: Never used   Substance and Sexual Activity   • Alcohol use: Yes     Alcohol/week: 4 0 standard drinks     Types: 2 Cans of beer, 2 Shots of liquor per week     Comment: social    • Drug use: No   • Sexual activity: Yes     Partners: Female     Birth control/protection: Male Sterilization, Female Sterilization   Other Topics Concern   • None   Social History Narrative    Advance directive on file, active         Social Determinants of Health     Financial Resource Strain: Low Risk    • Difficulty of Paying Living Expenses: Not very hard   Food Insecurity: Not on file   Transportation Needs: No Transportation Needs   • Lack of Transportation (Medical): No   • Lack of Transportation (Non-Medical): No   Physical Activity: Not on file   Stress: Not on file   Social Connections: Not on file   Intimate Partner Violence: Not on file   Housing Stability: Not on file      Medications and Allergies:     Current Outpatient Medications   Medication Sig Dispense Refill   • amLODIPine (NORVASC) 5 mg tablet TAKE 1 TABLET DAILY 90 tablet 0   • Ascorbic Acid (VITAMIN C) 500 MG CAPS Take 1 capsule by mouth daily     • famotidine (PEPCID) 20 mg tablet TAKE 1 TABLET DAILY 90 tablet 1   • metoprolol succinate (TOPROL-XL) 50 mg 24 hr tablet TAKE 1 TABLET DAILY 90 tablet 1   • Multiple Vitamins-Minerals (MULTIVITAMIN ADULTS 50+ PO) Take 1 tablet by mouth daily     • simvastatin (ZOCOR) 20 mg tablet TAKE 1 TABLET DAILY 90 tablet 1   • diclofenac sodium (VOLTAREN) 1 % Apply 2 g topically 4 (four) times a day (Patient not taking: Reported on 2022) 1 Tube 2     No current facility-administered medications for this visit       No Known Allergies   Immunizations:     Immunization History   Administered Date(s) Administered   • COVID-19 PFIZER VACCINE 0 3 ML IM 02/15/2021, 2021, 10/13/2021, 04/01/2022   • COVID-19 Pfizer Vac BIVALENT Alin-sucrose 12 Yr+ IM (BOOSTER ONLY) 09/20/2022   • INFLUENZA 09/30/2020, 09/20/2021, 09/20/2022   • Influenza Split High Dose Preservative Free IM 09/14/2016   • Influenza, high dose seasonal 0 7 mL 09/17/2018   • Influenza, seasonal, injectable 10/09/2013   • Pneumococcal Conjugate 13-Valent 08/24/2015   • Pneumococcal Polysaccharide PPV23 12/09/2013   • Tdap 02/24/2011   • Zoster 01/06/2014   • Zoster Vaccine Recombinant 03/05/2019, 06/14/2019   • influenza, trivalent, adjuvanted 09/16/2019, 09/30/2020      Health Maintenance:         Topic Date Due   • Colorectal Cancer Screening  01/15/2024   • Hepatitis C Screening  Completed     There are no preventive care reminders to display for this patient  Medicare Screening Tests and Risk Assessments:     Fredda Severance is here for his Subsequent Wellness visit  Health Risk Assessment:   Patient rates overall health as very good  Patient feels that their physical health rating is same  Patient is very satisfied with their life  Eyesight was rated as same  Hearing was rated as same  Patient feels that their emotional and mental health rating is same  Patients states they are never, rarely angry  Patient states they are never, rarely unusually tired/fatigued  Pain experienced in the last 7 days has been none  Patient states that he has experienced no weight loss or gain in last 6 months  Fall Risk Screening: In the past year, patient has experienced: no history of falling in past year      Home Safety:  Patient does not have trouble with stairs inside or outside of their home  Patient has working smoke alarms and has working carbon monoxide detector  Home safety hazards include: none  Nutrition:   Current diet is Regular and No Added Salt  Medications:   Patient is currently taking over-the-counter supplements  OTC medications include: Daily vitamins and vitamin c  Patient is able to manage medications  Activities of Daily Living (ADLs)/Instrumental Activities of Daily Living (IADLs):   Walk and transfer into and out of bed and chair?: Yes  Dress and groom yourself?: Yes    Bathe or shower yourself?: Yes    Feed yourself? Yes  Do your laundry/housekeeping?: Yes  Manage your money, pay your bills and track your expenses?: Yes  Make your own meals?: Yes    Do your own shopping?: Yes    Previous Hospitalizations:   Any hospitalizations or ED visits within the last 12 months?: No      Advance Care Planning:   Living will: Yes    Durable POA for healthcare: Yes    Advanced directive: Yes      PREVENTIVE SCREENINGS      Cardiovascular Screening:    General: Screening Not Indicated and History Lipid Disorder      Diabetes Screening:     General: Screening Current      Colorectal Cancer Screening:     General: Screening Current      Prostate Cancer Screening:    General: Screening Current      Abdominal Aortic Aneurysm (AAA) Screening:    Risk factors include: age between 73-69 yo and tobacco use        Lung Cancer Screening:     General: Screening Not Indicated      Hepatitis C Screening:    General: Screening Current    Screening, Brief Intervention, and Referral to Treatment (SBIRT)    Screening  Typical number of drinks in a day: 1  Typical number of drinks in a week: 5  Interpretation: Low risk drinking behavior  AUDIT-C Screenin) How often did you have a drink containing alcohol in the past year? 2 to 3 times a week  2) How many drinks did you have on a typical day when you were drinking in the past year?  1 to 2  3) How often did you have 6 or more drinks on one occasion in the past year? never    AUDIT-C Score: 3  Interpretation: Score 0-3 (male): Negative screen for alcohol misuse    Single Item Drug Screening:  How often have you used an illegal drug (including marijuana) or a prescription medication for non-medical reasons in the past year? never    Single Item Drug Screen Score: 0  Interpretation: Negative screen for possible drug use disorder    No exam data present     Physical Exam:     BP (!) 178/98   Pulse 80   Ht 5' 8" (1 727 m)   Wt 74 8 kg (164 lb 12 8 oz)   SpO2 98%   BMI 25 06 kg/m²     Physical Exam  Constitutional:       General: He is not in acute distress  Appearance: Normal appearance  He is well-developed and normal weight  He is not ill-appearing, toxic-appearing or diaphoretic  HENT:      Head: Normocephalic and atraumatic  Right Ear: External ear normal       Left Ear: External ear normal       Nose: Nose normal    Eyes:      Pupils: Pupils are equal, round, and reactive to light  Cardiovascular:      Rate and Rhythm: Normal rate and regular rhythm  Heart sounds: Normal heart sounds  No murmur heard  Pulmonary:      Effort: Pulmonary effort is normal       Breath sounds: Normal breath sounds  Abdominal:      General: There is no distension  Palpations: Abdomen is soft  Tenderness: There is no abdominal tenderness  There is no guarding  Neurological:      Mental Status: He is alert        wax external canal    Wesley Signs, DO Quinolones Counseling:  I discussed with the patient the risks of fluoroquinolones including but not limited to GI upset, allergic reaction, drug rash, diarrhea, dizziness, photosensitivity, yeast infections, liver function test abnormalities, tendonitis/tendon rupture.

## 2023-01-15 PROBLEM — H61.23 BILATERAL IMPACTED CERUMEN: Status: RESOLVED | Noted: 2022-11-10 | Resolved: 2023-01-15

## 2023-01-23 DIAGNOSIS — I10 ESSENTIAL HYPERTENSION: ICD-10-CM

## 2023-01-23 DIAGNOSIS — E78.5 HYPERLIPIDEMIA, UNSPECIFIED HYPERLIPIDEMIA TYPE: ICD-10-CM

## 2023-01-23 DIAGNOSIS — K21.9 GASTROESOPHAGEAL REFLUX DISEASE WITHOUT ESOPHAGITIS: ICD-10-CM

## 2023-01-23 RX ORDER — FAMOTIDINE 20 MG/1
TABLET, FILM COATED ORAL
Qty: 90 TABLET | Refills: 1 | Status: SHIPPED | OUTPATIENT
Start: 2023-01-23

## 2023-01-23 RX ORDER — SIMVASTATIN 20 MG
TABLET ORAL
Qty: 90 TABLET | Refills: 1 | Status: SHIPPED | OUTPATIENT
Start: 2023-01-23

## 2023-01-23 RX ORDER — METOPROLOL SUCCINATE 50 MG/1
TABLET, EXTENDED RELEASE ORAL
Qty: 90 TABLET | Refills: 1 | Status: SHIPPED | OUTPATIENT
Start: 2023-01-23

## 2023-03-09 DIAGNOSIS — I10 ESSENTIAL HYPERTENSION: ICD-10-CM

## 2023-03-09 RX ORDER — AMLODIPINE BESYLATE 5 MG/1
TABLET ORAL
Qty: 90 TABLET | Refills: 0 | Status: SHIPPED | OUTPATIENT
Start: 2023-03-09

## 2023-06-07 DIAGNOSIS — I10 ESSENTIAL HYPERTENSION: ICD-10-CM

## 2023-06-07 RX ORDER — AMLODIPINE BESYLATE 5 MG/1
TABLET ORAL
Qty: 90 TABLET | Refills: 0 | Status: SHIPPED | OUTPATIENT
Start: 2023-06-07

## 2023-07-22 DIAGNOSIS — K21.9 GASTROESOPHAGEAL REFLUX DISEASE WITHOUT ESOPHAGITIS: ICD-10-CM

## 2023-07-22 DIAGNOSIS — I10 ESSENTIAL HYPERTENSION: ICD-10-CM

## 2023-07-22 RX ORDER — METOPROLOL SUCCINATE 50 MG/1
TABLET, EXTENDED RELEASE ORAL
Qty: 90 TABLET | Refills: 1 | Status: SHIPPED | OUTPATIENT
Start: 2023-07-22

## 2023-07-22 RX ORDER — FAMOTIDINE 20 MG/1
TABLET, FILM COATED ORAL
Qty: 90 TABLET | Refills: 1 | Status: SHIPPED | OUTPATIENT
Start: 2023-07-22

## 2023-07-24 DIAGNOSIS — E78.5 HYPERLIPIDEMIA, UNSPECIFIED HYPERLIPIDEMIA TYPE: ICD-10-CM

## 2023-07-24 RX ORDER — SIMVASTATIN 20 MG
20 TABLET ORAL DAILY
Qty: 90 TABLET | Refills: 1 | Status: SHIPPED | OUTPATIENT
Start: 2023-07-24

## 2023-08-30 ENCOUNTER — ESTABLISHED COMPREHENSIVE EXAM (OUTPATIENT)
Dept: URBAN - METROPOLITAN AREA CLINIC 6 | Facility: CLINIC | Age: 73
End: 2023-08-30

## 2023-08-30 DIAGNOSIS — H25.813: ICD-10-CM

## 2023-08-30 DIAGNOSIS — H43.812: ICD-10-CM

## 2023-08-30 DIAGNOSIS — D31.32: ICD-10-CM

## 2023-08-30 PROCEDURE — 92250 FUNDUS PHOTOGRAPHY W/I&R: CPT

## 2023-08-30 PROCEDURE — 92014 COMPRE OPH EXAM EST PT 1/>: CPT

## 2023-08-30 ASSESSMENT — VISUAL ACUITY
OD_CC: 20/40
OS_CC: 20/25

## 2023-08-30 ASSESSMENT — TONOMETRY
OS_IOP_MMHG: 21
OD_IOP_MMHG: 18

## 2023-09-05 DIAGNOSIS — I10 ESSENTIAL HYPERTENSION: ICD-10-CM

## 2023-09-06 RX ORDER — AMLODIPINE BESYLATE 5 MG/1
TABLET ORAL
Qty: 90 TABLET | Refills: 0 | Status: SHIPPED | OUTPATIENT
Start: 2023-09-06

## 2023-09-06 NOTE — TELEPHONE ENCOUNTER
The patient has an appointment scheduled in November. Can you refill his medication? Please advise.  Thank you

## 2023-11-15 ENCOUNTER — OFFICE VISIT (OUTPATIENT)
Dept: INTERNAL MEDICINE CLINIC | Facility: CLINIC | Age: 73
End: 2023-11-15
Payer: COMMERCIAL

## 2023-11-15 VITALS
DIASTOLIC BLOOD PRESSURE: 78 MMHG | BODY MASS INDEX: 25.19 KG/M2 | OXYGEN SATURATION: 98 % | HEART RATE: 75 BPM | SYSTOLIC BLOOD PRESSURE: 134 MMHG | HEIGHT: 68 IN | RESPIRATION RATE: 16 BRPM | WEIGHT: 166.2 LBS

## 2023-11-15 DIAGNOSIS — K21.9 GASTROESOPHAGEAL REFLUX DISEASE WITHOUT ESOPHAGITIS: ICD-10-CM

## 2023-11-15 DIAGNOSIS — I10 PRIMARY HYPERTENSION: ICD-10-CM

## 2023-11-15 DIAGNOSIS — Z12.11 SCREENING FOR COLON CANCER: ICD-10-CM

## 2023-11-15 DIAGNOSIS — Z87.898 HISTORY OF PREDIABETES: ICD-10-CM

## 2023-11-15 DIAGNOSIS — E78.5 HYPERLIPIDEMIA, UNSPECIFIED HYPERLIPIDEMIA TYPE: ICD-10-CM

## 2023-11-15 DIAGNOSIS — Z12.5 SCREENING PSA (PROSTATE SPECIFIC ANTIGEN): ICD-10-CM

## 2023-11-15 DIAGNOSIS — Z00.00 MEDICARE ANNUAL WELLNESS VISIT, SUBSEQUENT: Primary | ICD-10-CM

## 2023-11-15 PROCEDURE — G0439 PPPS, SUBSEQ VISIT: HCPCS | Performed by: INTERNAL MEDICINE

## 2023-11-15 PROCEDURE — 99213 OFFICE O/P EST LOW 20 MIN: CPT | Performed by: INTERNAL MEDICINE

## 2023-11-15 NOTE — PROGRESS NOTES
Assessment and Plan:     Problem List Items Addressed This Visit        Digestive    Gastroesophageal reflux disease without esophagitis     Currently stable and doing very well at this point in time he can use the Pepcid 20 mg once a day as needed            Endocrine    History of prediabetes     I have counselled the pt to follow a healthy and balanced diet ,and recommend routine exercise. Check A1c/fasting blood sugar         Relevant Orders    Hemoglobin A1C       Cardiovascular and Mediastinum    Hypertension     Hypertension - controlled, I have counseled patient following healthy balance diet, I would like the patient reduce sodium, exercise routinely, I would like the patient continued the med current medical regiment and we will continue to monitor. Continue amlodipine 5 mg once daily, Toprol-XL 50 mg once daily; blood pressure today 134/78            Other    Medicare annual wellness visit, subsequent - Primary     Assessment and plan 1. Health maintenance annual wellness examination overall the patient is clinically stable and doing well, we encouraged the patient to follow a healthy and balanced diet. We recommend that the patient exercise routinely approximately 30 minutes 5 times per week . We have reviewed the patient's vaccines and have made recommendations for updates if necessary   annual flu shot recommended/COVID/RSV vaccination   . We will be ordering screening laboratories which are age appropriate. Return to the office in   1 year   call if any problems. Screening for colon cancer     Consult GI Dr. Madeline Ortega for screening colonoscopy         Relevant Orders    Ambulatory Referral to Gastroenterology    Hyperlipidemia     Hyperlipidemia controlled continue with current medical regiment recommend a low-cholesterol diet and recommend routine exercise we will continue to monitor the progress.   Continue Zocor 20 mg once daily         Relevant Orders    Comprehensive metabolic panel Lipid panel    Screening PSA (prostate specific antigen)     We will check screening PSA        RTO in 1 year call if any problems    BMI Counseling: Body mass index is 25.27 kg/m². The BMI is above normal. Nutrition recommendations include decreasing portion sizes and moderation in carbohydrate intake. Exercise recommendations include exercising 3-5 times per week. Rationale for BMI follow-up plan is due to patient being overweight or obese. Depression Screening and Follow-up Plan: Patient was screened for depression during today's encounter. They screened negative with a PHQ-2 score of 0. Preventive health issues were discussed with patient, and age appropriate screening tests were ordered as noted in patient's After Visit Summary. Personalized health advice and appropriate referrals for health education or preventive services given if needed, as noted in patient's After Visit Summary. History of Present Illness:     Patient presents for a Medicare Wellness Visit    HPI 71-year old male coming in for a follow up visit regarding hypertension, GERD, history of prediabetes and hyperlipidemia; the patient reports me compliant taking medications without untoward side effects the. The patient is here to review his medical condition, update me on the medical condition and the patient reports me no hospitalizations and no ER visits. No injuries no illnesses here to review laboratories in detail overall he is doing very well following a healthy and balanced diet and remains active. Patient Care Team:  Judie Kimble DO as PCP - MD Lena Bustillos MD as Endoscopist     Review of Systems:     Review of Systems   Constitutional:  Negative for activity change, appetite change and unexpected weight change. HENT:  Negative for congestion and postnasal drip. Eyes:  Negative for visual disturbance.    Respiratory:  Negative for cough and shortness of breath. Cardiovascular:  Negative for chest pain. Gastrointestinal:  Negative for abdominal pain, diarrhea, nausea and vomiting. Neurological:  Negative for dizziness, light-headedness and headaches. Hematological:  Negative for adenopathy. Problem List:     Patient Active Problem List   Diagnosis   • Medicare annual wellness visit, subsequent   • Gastroesophageal reflux disease without esophagitis   • Elevated blood sugar   • Screening for colon cancer   • Crepitus of right shoulder joint   • Pain of left thumb   • Hyperlipidemia   • Chronic pain in right shoulder   • Thumb pain, left   • Hypertension   • History of prediabetes   • Screening PSA (prostate specific antigen)   • Neck pain   • Pain of right lower extremity   • Edema      Past Medical and Surgical History:     Past Medical History:   Diagnosis Date   • Arthritis Unknown   • Ear problems    • GERD (gastroesophageal reflux disease) Jan. 1995   • Hyperlipidemia    • Hypertension    • Shingles Jan. 1967   • Sinusitis    • Tonsillitis      Past Surgical History:   Procedure Laterality Date   • BACK SURGERY     • HAND SURGERY      Incision Tendon Sheath Of a Finger   • CO COLONOSCOPY FLX DX W/COLLJ SPEC WHEN PFRMD N/A 01/15/2019    Procedure: COLONOSCOPY;  Surgeon: Anuradha Schwartz MD;  [facility];   Service: Gastroenterology   • SINUS SURGERY     • [address] [place] [address], 2010   • TONSILLECTOMY AND ADENOIDECTOMY        Family History:     Family History   Problem Relation Age of Onset   • Heart disease Mother    • Alcohol abuse Mother    • Heart failure Mother    • Colon cancer Father    • Diabetes Brother       Social History:     Social History     Socioeconomic History   • Marital status: /Civil Union     Spouse name: None   • Number of children: None   • Years of education: None   • Highest education level: None   Occupational History   • None   Tobacco Use   • Smoking status: Former     Packs/day: 0.50 Years: 10.00     Total pack years: 5.00     Types: Cigarettes     Quit date: 1984     Years since quittin.9   • Smokeless tobacco: Never   Vaping Use   • Vaping Use: Never used   Substance and Sexual Activity   • Alcohol use: Yes     Alcohol/week: 4.0 standard drinks of alcohol     Types: 2 Cans of beer, 2 Shots of liquor per week     Comment: social    • Drug use: No   • Sexual activity: Yes     Partners: Female     Birth control/protection: Male Sterilization, Female Sterilization   Other Topics Concern   • None   Social History Narrative    Advance directive on file, active         Social Determinants of Health     Financial Resource Strain: Low Risk  (11/15/2023)    Overall Financial Resource Strain (CARDIA)    • Difficulty of Paying Living Expenses: Not hard at all   Food Insecurity: Not on file   Transportation Needs: No Transportation Needs (11/15/2023)    PRAPARE - Transportation    • Lack of Transportation (Medical): No    • Lack of Transportation (Non-Medical): No   Physical Activity: Not on file   Stress: Not on file   Social Connections: Not on file   Intimate Partner Violence: Not on file   Housing Stability: Not on file      Medications and Allergies:     Current Outpatient Medications   Medication Sig Dispense Refill   • amLODIPine (NORVASC) 5 mg tablet TAKE 1 TABLET DAILY 90 tablet 0   • Ascorbic Acid (VITAMIN C) 500 MG CAPS Take 1 capsule by mouth daily     • diclofenac sodium (VOLTAREN) 1 % Apply 2 g topically 4 (four) times a day 1 Tube 2   • famotidine (PEPCID) 20 mg tablet TAKE 1 TABLET DAILY 90 tablet 1   • metoprolol succinate (TOPROL-XL) 50 mg 24 hr tablet TAKE 1 TABLET DAILY 90 tablet 1   • Multiple Vitamins-Minerals (MULTIVITAMIN ADULTS 50+ PO) Take 1 tablet by mouth daily     • simvastatin (ZOCOR) 20 mg tablet Take 1 tablet (20 mg total) by mouth daily 90 tablet 1     No current facility-administered medications for this visit.      No Known Allergies   Immunizations: Immunization History   Administered Date(s) Administered   • COVID-19 PFIZER VACCINE 0.3 ML IM 02/15/2021, 03/08/2021, 10/13/2021, 04/01/2022   • COVID-19 Pfizer Vac BIVALENT Alin-sucrose 12 Yr+ IM 09/20/2022, 05/17/2023   • INFLUENZA 09/30/2020, 09/20/2021, 09/20/2022, 09/22/2023   • Influenza Split High Dose Preservative Free IM 09/14/2016   • Influenza, high dose seasonal 0.7 mL 09/17/2018   • Influenza, seasonal, injectable 10/09/2013   • Pneumococcal Conjugate 13-Valent 08/24/2015   • Pneumococcal Polysaccharide PPV23 12/09/2013   • Tdap 02/24/2011   • Zoster 01/06/2014   • Zoster Vaccine Recombinant 03/05/2019, 06/14/2019   • influenza, trivalent, adjuvanted 09/16/2019, 09/30/2020      Health Maintenance:         Topic Date Due   • Colorectal Cancer Screening  01/15/2024   • Hepatitis C Screening  Completed     There are no preventive care reminders to display for this patient. Medicare Screening Tests and Risk Assessments:     Muna Pereyra is here for his Subsequent Wellness visit. Health Risk Assessment:   Patient rates overall health as very good. Patient feels that their physical health rating is same. Patient is very satisfied with their life. Eyesight was rated as same. Hearing was rated as same. Patient feels that their emotional and mental health rating is same. Patients states they are never, rarely angry. Patient states they are never, rarely unusually tired/fatigued. Pain experienced in the last 7 days has been some. Patient's pain rating has been 1/10. Patient states that he has experienced no weight loss or gain in last 6 months. Intermittent back spasm    Depression Screening:   PHQ-2 Score: 0      Fall Risk Screening: In the past year, patient has experienced: no history of falling in past year      Home Safety:  Patient does not have trouble with stairs inside or outside of their home. Patient has working smoke alarms and has working carbon monoxide detector.  Home safety hazards include: none.     Nutrition:   Current diet is Regular. Medications:   Patient is not currently taking any over-the-counter supplements. Patient is able to manage medications. Activities of Daily Living (ADLs)/Instrumental Activities of Daily Living (IADLs):   Walk and transfer into and out of bed and chair?: Yes  Dress and groom yourself?: Yes    Bathe or shower yourself?: Yes    Feed yourself? Yes  Do your laundry/housekeeping?: Yes  Manage your money, pay your bills and track your expenses?: Yes  Make your own meals?: Yes    Do your own shopping?: Yes    Previous Hospitalizations:   Any hospitalizations or ED visits within the last 12 months?: No      Advance Care Planning:   Living will: Yes    Durable POA for healthcare: Yes    Advanced directive: Yes      Comments: Pt will send updated living will    Cognitive Screening:   Provider or family/friend/caregiver concerned regarding cognition?: No    PREVENTIVE SCREENINGS      Cardiovascular Screening:    General: Screening Not Indicated and History Lipid Disorder      Colorectal Cancer Screening:     General: Screening Current      Prostate Cancer Screening:    General: Screening Not Indicated      Abdominal Aortic Aneurysm (AAA) Screening:    Risk factors include: age between 70-77 yo and tobacco use        Lung Cancer Screening:     General: Screening Not Indicated      Hepatitis C Screening:    General: Screening Current    Screening, Brief Intervention, and Referral to Treatment (SBIRT)    Screening  Typical number of drinks in a day: 0  Typical number of drinks in a week: 3  Interpretation: Low risk drinking behavior.     AUDIT-C Screenin) How often did you have a drink containing alcohol in the past year? 2 to 4 times a month  2) How many drinks did you have on a typical day when you were drinking in the past year? 0  3) How often did you have 6 or more drinks on one occasion in the past year? never    AUDIT-C Score: 2  Interpretation: Score 0-3 (male): Negative screen for alcohol misuse    Single Item Drug Screening:  How often have you used an illegal drug (including marijuana) or a prescription medication for non-medical reasons in the past year? never    Single Item Drug Screen Score: 0  Interpretation: Negative screen for possible drug use disorder    No results found. Physical Exam:     /78   Pulse 75   Resp 16   Ht 5' 8" (1.727 m)   Wt 75.4 kg (166 lb 3.2 oz)   SpO2 98%   BMI 25.27 kg/m²     Physical Exam  Vitals and nursing note reviewed. Constitutional:       General: He is not in acute distress. Appearance: Normal appearance. He is well-developed. He is not ill-appearing, toxic-appearing or diaphoretic. HENT:      Head: Normocephalic and atraumatic. Right Ear: External ear normal.      Left Ear: External ear normal.      Nose: Nose normal.   Eyes:      Pupils: Pupils are equal, round, and reactive to light. Cardiovascular:      Rate and Rhythm: Normal rate and regular rhythm. Heart sounds: Normal heart sounds. No murmur heard. Pulmonary:      Effort: Pulmonary effort is normal.      Breath sounds: Normal breath sounds. Abdominal:      General: There is no distension. Palpations: Abdomen is soft. Tenderness: There is no abdominal tenderness. There is no guarding. Neurological:      Mental Status: He is alert.           Chuy Goods, DO

## 2023-11-15 NOTE — PATIENT INSTRUCTIONS
Medicare Preventive Visit Patient Instructions  Thank you for completing your Welcome to Medicare Visit or Medicare Annual Wellness Visit today. Your next wellness visit will be due in one year (11/15/2024). The screening/preventive services that you may require over the next 5-10 years are detailed below. Some tests may not apply to you based off risk factors and/or age. Screening tests ordered at today's visit but not completed yet may show as past due. Also, please note that scanned in results may not display below. Preventive Screenings:  Service Recommendations Previous Testing/Comments   Colorectal Cancer Screening  Colonoscopy    Fecal Occult Blood Test (FOBT)/Fecal Immunochemical Test (FIT)  Fecal DNA/Cologuard Test  Flexible Sigmoidoscopy Age: 43-73 years old   Colonoscopy: every 10 years (May be performed more frequently if at higher risk)  OR  FOBT/FIT: every 1 year  OR  Cologuard: every 3 years  OR  Sigmoidoscopy: every 5 years  Screening may be recommended earlier than age 39 if at higher risk for colorectal cancer. Also, an individualized decision between you and your healthcare provider will decide whether screening between the ages of 77-80 would be appropriate.  Colonoscopy: 01/15/2019  FOBT/FIT: Not on file  Cologuard: Not on file  Sigmoidoscopy: Not on file          Prostate Cancer Screening Individualized decision between patient and health care provider in men between ages of 53-66   Medicare will cover every 12 months beginning on the day after your 50th birthday PSA: 1.22 ng/mL           Hepatitis C Screening Once for adults born between 1945 and 1965  More frequently in patients at high risk for Hepatitis C Hep C Antibody: 09/21/2017        Diabetes Screening 1-2 times per year if you're at risk for diabetes or have pre-diabetes Fasting glucose: No results in last 5 years (No results in last 5 years)  A1C: 5.2 % of total Hgb (11/10/2022)      Cholesterol Screening Once every 5 years if you don't have a lipid disorder. May order more often based on risk factors. Lipid panel: 11/10/2022         Other Preventive Screenings Covered by Medicare:  Abdominal Aortic Aneurysm (AAA) Screening: covered once if your at risk. You're considered to be at risk if you have a family history of AAA or a male between the age of 70-76 who smoking at least 100 cigarettes in your lifetime. Lung Cancer Screening: covers low dose CT scan once per year if you meet all of the following conditions: (1) Age 48-67; (2) No signs or symptoms of lung cancer; (3) Current smoker or have quit smoking within the last 15 years; (4) You have a tobacco smoking history of at least 20 pack years (packs per day x number of years you smoked); (5) You get a written order from a healthcare provider. Glaucoma Screening: covered annually if you're considered high risk: (1) You have diabetes OR (2) Family history of glaucoma OR (3)  aged 48 and older OR (3)  American aged 72 and older  Osteoporosis Screening: covered every 2 years if you meet one of the following conditions: (1) Have a vertebral abnormality; (2) On glucocorticoid therapy for more than 3 months; (3) Have primary hyperparathyroidism; (4) On osteoporosis medications and need to assess response to drug therapy. HIV Screening: covered annually if you're between the age of 14-79. Also covered annually if you are younger than 13 and older than 72 with risk factors for HIV infection. For pregnant patients, it is covered up to 3 times per pregnancy.     Immunizations:  Immunization Recommendations   Influenza Vaccine Annual influenza vaccination during flu season is recommended for all persons aged >= 6 months who do not have contraindications   Pneumococcal Vaccine   * Pneumococcal conjugate vaccine = PCV13 (Prevnar 13), PCV15 (Vaxneuvance), PCV20 (Prevnar 20)  * Pneumococcal polysaccharide vaccine = PPSV23 (Pneumovax) Adults 05-52 yo with certain risk factors or if 65+ yo  If never received any pneumonia vaccine: recommend Prevnar 21 (PCV20)  Give PCV20 if previously received 1 dose of PCV13 or PPSV23   Hepatitis B Vaccine 3 dose series if at intermediate or high risk (ex: diabetes, end stage renal disease, liver disease)   Respiratory syncytial virus (RSV) Vaccine - COVERED BY MEDICARE PART D  * RSVPreF3 (Arexvy) CDC recommends that adults 61years of age and older may receive a single dose of RSV vaccine using shared clinical decision-making (SCDM)   Tetanus (Td) Vaccine - COST NOT COVERED BY MEDICARE PART B Following completion of primary series, a booster dose should be given every 10 years to maintain immunity against tetanus. Td may also be given as tetanus wound prophylaxis. Tdap Vaccine - COST NOT COVERED BY MEDICARE PART B Recommended at least once for all adults. For pregnant patients, recommended with each pregnancy. Shingles Vaccine (Shingrix) - COST NOT COVERED BY MEDICARE PART B  2 shot series recommended in those 19 years and older who have or will have weakened immune systems or those 50 years and older     Health Maintenance Due:      Topic Date Due   • Colorectal Cancer Screening  01/15/2024   • Hepatitis C Screening  Completed     Immunizations Due:  There are no preventive care reminders to display for this patient. Advance Directives   What are advance directives? Advance directives are legal documents that state your wishes and plans for medical care. These plans are made ahead of time in case you lose your ability to make decisions for yourself. Advance directives can apply to any medical decision, such as the treatments you want, and if you want to donate organs. What are the types of advance directives? There are many types of advance directives, and each state has rules about how to use them. You may choose a combination of any of the following:  Living will: This is a written record of the treatment you want.  You can also choose which treatments you do not want, which to limit, and which to stop at a certain time. This includes surgery, medicine, IV fluid, and tube feedings. Durable power of  for [facility]): This is a written record that states who you want to make healthcare choices for you when you are unable to make them for yourself. This person, called a proxy, is usually a family member or a friend. You may choose more than 1 proxy. Do not resuscitate (DNR) order:  A DNR order is used in case your heart stops beating or you stop breathing. It is a request not to have certain forms of treatment, such as CPR. A DNR order may be included in other types of advance directives. Medical directive: This covers the care that you want if you are in a coma, near death, or unable to make decisions for yourself. You can list the treatments you want for each condition. Treatment may include pain medicine, surgery, blood transfusions, dialysis, IV or tube feedings, and a ventilator (breathing machine). Values history: This document has questions about your views, beliefs, and how you feel and think about life. This information can help others choose the care that you would choose. Why are advance directives important? An advance directive helps you control your care. Although spoken wishes may be used, it is better to have your wishes written down. Spoken wishes can be misunderstood, or not followed. Treatments may be given even if you do not want them. An advance directive may make it easier for your family to make difficult choices about your care. © Copyright [employer] 2018 Information is for End User's use only and may not be sold, redistributed or otherwise used for commercial purposes.  All illustrations and images included in CareNotes® are the copyrighted property of [employer] or  Page

## 2023-11-16 DIAGNOSIS — Z12.5 SCREENING PSA (PROSTATE SPECIFIC ANTIGEN): Primary | ICD-10-CM

## 2023-11-19 LAB
ALBUMIN SERPL-MCNC: 4.4 G/DL (ref 3.6–5.1)
ALBUMIN/GLOB SERPL: 1.7 (CALC) (ref 1–2.5)
ALP SERPL-CCNC: 75 U/L (ref 35–144)
ALT SERPL-CCNC: 22 U/L (ref 9–46)
AST SERPL-CCNC: 29 U/L (ref 10–35)
BILIRUB SERPL-MCNC: 0.8 MG/DL (ref 0.2–1.2)
BUN SERPL-MCNC: 16 MG/DL (ref 7–25)
BUN/CREAT SERPL: ABNORMAL (CALC) (ref 6–22)
CALCIUM SERPL-MCNC: 9.5 MG/DL (ref 8.6–10.3)
CHLORIDE SERPL-SCNC: 102 MMOL/L (ref 98–110)
CHOLEST SERPL-MCNC: 175 MG/DL
CHOLEST/HDLC SERPL: 2.1 (CALC)
CO2 SERPL-SCNC: 26 MMOL/L (ref 20–32)
CREAT SERPL-MCNC: 0.91 MG/DL (ref 0.7–1.28)
GFR/BSA.PRED SERPLBLD CYS-BASED-ARV: 88 ML/MIN/1.73M2
GLOBULIN SER CALC-MCNC: 2.6 G/DL (CALC) (ref 1.9–3.7)
GLUCOSE SERPL-MCNC: 115 MG/DL (ref 65–99)
HBA1C MFR BLD: 5.3 % OF TOTAL HGB
HDLC SERPL-MCNC: 83 MG/DL
LDLC SERPL CALC-MCNC: 79 MG/DL (CALC)
NONHDLC SERPL-MCNC: 92 MG/DL (CALC)
POTASSIUM SERPL-SCNC: 4.5 MMOL/L (ref 3.5–5.3)
PROT SERPL-MCNC: 7 G/DL (ref 6.1–8.1)
PSA SERPL-MCNC: 1.21 NG/ML
SODIUM SERPL-SCNC: 136 MMOL/L (ref 135–146)
TRIGL SERPL-MCNC: 58 MG/DL

## 2023-11-19 NOTE — ASSESSMENT & PLAN NOTE
Hypertension - controlled, I have counseled patient following healthy balance diet, I would like the patient reduce sodium, exercise routinely, I would like the patient continued the med current medical regiment and we will continue to monitor.   Continue amlodipine 5 mg once daily, Toprol-XL 50 mg once daily; blood pressure today 134/78

## 2023-11-19 NOTE — ASSESSMENT & PLAN NOTE
I have counselled the pt to follow a healthy and balanced diet ,and recommend routine exercise.   Check A1c/fasting blood sugar

## 2023-11-19 NOTE — ASSESSMENT & PLAN NOTE
Currently stable and doing very well at this point in time he can use the Pepcid 20 mg once a day as needed

## 2023-11-19 NOTE — ASSESSMENT & PLAN NOTE
Assessment and plan 1. Health maintenance annual wellness examination overall the patient is clinically stable and doing well, we encouraged the patient to follow a healthy and balanced diet. We recommend that the patient exercise routinely approximately 30 minutes 5 times per week . We have reviewed the patient's vaccines and have made recommendations for updates if necessary   annual flu shot recommended/COVID/RSV vaccination   . We will be ordering screening laboratories which are age appropriate. Return to the office in   1 year   call if any problems.

## 2023-11-21 ENCOUNTER — TELEPHONE (OUTPATIENT)
Age: 73
End: 2023-11-21

## 2023-11-21 NOTE — TELEPHONE ENCOUNTER
11/21/23  Screened by: Rogelio Cedillo    Referring Provider     Pre- Screening: There is no height or weight on file to calculate BMI. Has patient been referred for a routine screening Colonoscopy? yes  Is the patient between 43-73 years old? yes      Previous Colonoscopy yes   If yes:    Date: 2019 Dr Ronak Etienne:     Reason:       SCHEDULING STAFF: If the patient is between 39yrs-51yrs, please advise patient to confirm benefits/coverage with their insurance company for a routine screening colonoscopy, some insurance carriers will only cover at [address] [place] [address] or older. If the patient is over 66years old, please schedule an office visit. Does the patient want to see a Gastroenterologist prior to their procedure OR are they having any GI symptoms? no    Has the patient been hospitalized or had abdominal surgery in the past 6 months? no    Does the patient use supplemental oxygen? no    Does the patient take Coumadin, Lovenox, Plavix, Elliquis, Xarelto, or other blood thinning medication? no    Has the patient had a stroke, cardiac event, or stent placed in the past year? no    SCHEDULING STAFF: If patient answers NO to above questions, then schedule procedure. If patient answers YES to above questions, then schedule office appointment. Patient passed OA  If patient is between 45yrs - 49yrs, please advise patient that we will have to confirm benefits & coverage with their insurance company for a routine screening colonoscopy.

## 2023-11-21 NOTE — TELEPHONE ENCOUNTER
Scheduled date of colonoscopy (as of today):01/24/2024  Physician performing colonoscopy:Dr Ngo  Location of colonoscopy:[facility]  Bowel prep reviewed with patient:miralax/dul  Instructions reviewed with patient by:sent via my chart   Clearances: n/a

## 2023-11-27 DIAGNOSIS — I10 ESSENTIAL HYPERTENSION: ICD-10-CM

## 2023-11-27 RX ORDER — AMLODIPINE BESYLATE 5 MG/1
TABLET ORAL
Qty: 90 TABLET | Refills: 0 | Status: SHIPPED | OUTPATIENT
Start: 2023-11-27

## 2024-01-10 ENCOUNTER — ANESTHESIA (OUTPATIENT)
Dept: ANESTHESIOLOGY | Facility: HOSPITAL | Age: 74
End: 2024-01-10

## 2024-01-10 ENCOUNTER — ANESTHESIA EVENT (OUTPATIENT)
Dept: ANESTHESIOLOGY | Facility: HOSPITAL | Age: 74
End: 2024-01-10

## 2024-01-11 DIAGNOSIS — I10 ESSENTIAL HYPERTENSION: ICD-10-CM

## 2024-01-11 DIAGNOSIS — E78.5 HYPERLIPIDEMIA, UNSPECIFIED HYPERLIPIDEMIA TYPE: ICD-10-CM

## 2024-01-11 DIAGNOSIS — K21.9 GASTROESOPHAGEAL REFLUX DISEASE WITHOUT ESOPHAGITIS: ICD-10-CM

## 2024-01-11 RX ORDER — FAMOTIDINE 20 MG/1
TABLET, FILM COATED ORAL
Qty: 90 TABLET | Refills: 1 | Status: SHIPPED | OUTPATIENT
Start: 2024-01-11

## 2024-01-11 RX ORDER — METOPROLOL SUCCINATE 50 MG/1
TABLET, EXTENDED RELEASE ORAL
Qty: 90 TABLET | Refills: 1 | Status: SHIPPED | OUTPATIENT
Start: 2024-01-11

## 2024-01-11 RX ORDER — SIMVASTATIN 20 MG
20 TABLET ORAL DAILY
Qty: 90 TABLET | Refills: 1 | Status: SHIPPED | OUTPATIENT
Start: 2024-01-11

## 2024-01-24 ENCOUNTER — ANESTHESIA EVENT (OUTPATIENT)
Dept: GASTROENTEROLOGY | Facility: AMBULARY SURGERY CENTER | Age: 74
End: 2024-01-24

## 2024-01-24 ENCOUNTER — ANESTHESIA (OUTPATIENT)
Dept: GASTROENTEROLOGY | Facility: AMBULARY SURGERY CENTER | Age: 74
End: 2024-01-24

## 2024-01-24 ENCOUNTER — HOSPITAL ENCOUNTER (OUTPATIENT)
Dept: GASTROENTEROLOGY | Facility: AMBULARY SURGERY CENTER | Age: 74
Setting detail: OUTPATIENT SURGERY
Discharge: HOME | End: 2024-01-24
Attending: INTERNAL MEDICINE | Admitting: INTERNAL MEDICINE
Payer: COMMERCIAL

## 2024-01-24 VITALS
SYSTOLIC BLOOD PRESSURE: 129 MMHG | WEIGHT: 159 LBS | DIASTOLIC BLOOD PRESSURE: 71 MMHG | BODY MASS INDEX: 24.1 KG/M2 | RESPIRATION RATE: 19 BRPM | HEART RATE: 80 BPM | HEIGHT: 68 IN | TEMPERATURE: 97.6 F | OXYGEN SATURATION: 98 %

## 2024-01-24 DIAGNOSIS — Z86.010 HISTORY OF COLON POLYPS: ICD-10-CM

## 2024-01-24 PROCEDURE — 88305 TISSUE EXAM BY PATHOLOGIST: CPT | Performed by: PATHOLOGY

## 2024-01-24 PROCEDURE — 45380 COLONOSCOPY AND BIOPSY: CPT | Performed by: INTERNAL MEDICINE

## 2024-01-24 RX ORDER — PROPOFOL 10 MG/ML
INJECTION, EMULSION INTRAVENOUS AS NEEDED
Status: DISCONTINUED | OUTPATIENT
Start: 2024-01-24 | End: 2024-01-24

## 2024-01-24 RX ORDER — SODIUM CHLORIDE, SODIUM LACTATE, POTASSIUM CHLORIDE, CALCIUM CHLORIDE 600; 310; 30; 20 MG/100ML; MG/100ML; MG/100ML; MG/100ML
INJECTION, SOLUTION INTRAVENOUS CONTINUOUS PRN
Status: DISCONTINUED | OUTPATIENT
Start: 2024-01-24 | End: 2024-01-24

## 2024-01-24 RX ORDER — LIDOCAINE HYDROCHLORIDE 20 MG/ML
INJECTION, SOLUTION EPIDURAL; INFILTRATION; INTRACAUDAL; PERINEURAL AS NEEDED
Status: DISCONTINUED | OUTPATIENT
Start: 2024-01-24 | End: 2024-01-24

## 2024-01-24 RX ADMIN — LIDOCAINE HYDROCHLORIDE 100 MG: 20 INJECTION, SOLUTION EPIDURAL; INFILTRATION; INTRACAUDAL at 10:54

## 2024-01-24 RX ADMIN — SODIUM CHLORIDE, SODIUM LACTATE, POTASSIUM CHLORIDE, AND CALCIUM CHLORIDE: .6; .31; .03; .02 INJECTION, SOLUTION INTRAVENOUS at 10:52

## 2024-01-24 RX ADMIN — PROPOFOL 50 MG: 10 INJECTION, EMULSION INTRAVENOUS at 11:00

## 2024-01-24 RX ADMIN — Medication 40 MG: at 11:01

## 2024-01-24 RX ADMIN — PROPOFOL 100 MG: 10 INJECTION, EMULSION INTRAVENOUS at 10:54

## 2024-01-24 RX ADMIN — PROPOFOL 50 MG: 10 INJECTION, EMULSION INTRAVENOUS at 11:04

## 2024-01-24 NOTE — ANESTHESIA PREPROCEDURE EVALUATION
Procedure:  COLONOSCOPY    Relevant Problems   CARDIO   (+) Hyperlipidemia   (+) Hypertension      GI/HEPATIC   (+) Gastroesophageal reflux disease without esophagitis      NEURO/PSYCH   (+) Chronic pain in right shoulder        Physical Exam    Airway    Mallampati score: II  TM Distance: >3 FB  Neck ROM: full     Dental   No notable dental hx     Cardiovascular      Pulmonary      Other Findings        Anesthesia Plan  ASA Score- 2     Anesthesia Type- IV sedation with anesthesia with ASA Monitors.         Additional Monitors:     Airway Plan:            Plan Factors-Exercise tolerance (METS): >4 METS.    Chart reviewed.    Patient summary reviewed.    Patient is not a current smoker.              Induction- intravenous.    Postoperative Plan-     Informed Consent- Anesthetic plan and risks discussed with patient.  I personally reviewed this patient with the CRNA. Discussed and agreed on the Anesthesia Plan with the CRNA..

## 2024-01-24 NOTE — ANESTHESIA POSTPROCEDURE EVALUATION
Post-Op Assessment Note    CV Status:  Stable  Pain Score: 0    Pain management: adequate       Mental Status:  Alert and awake   Hydration Status:  Euvolemic   PONV Controlled:  Controlled   Airway Patency:  Patent     Post Op Vitals Reviewed: Yes    No anethesia notable event occurred.    Staff: Anesthesiologist, CRNA               /58 (01/24/24 1114)    Temp 97.6 °F (36.4 °C) (01/24/24 1114)    Pulse 77 (01/24/24 1114)   Resp 16 (01/24/24 1114)    SpO2 100 % (01/24/24 1114)

## 2024-01-24 NOTE — H&P
"History and Physical -  [facility]  Arnulfo Alexandra 73 y.o. male MRN: [id number]    HPI: Arnulfo Alexandra is a 73 y.o. year old male who presents with hx of colon polyps.       Review of Systems    Historical Information   Past Medical History:   Diagnosis Date    Arthritis Unknown    Colon polyp     Ear problems     GERD (gastroesophageal reflux disease) 1995    Hyperlipidemia     Hypertension     Shingles 1967    Sinusitis     Tonsillitis      Past Surgical History:   Procedure Laterality Date    BACK SURGERY      HAND SURGERY      Incision Tendon Sheath Of a Finger    [place] COLONOSCOPY FLX DX W/COLLJ SPEC WHEN PFRMD N/A 01/15/2019    Procedure: COLONOSCOPY;  Surgeon: Lucas Ngo MD;  Location: [facility];  Service: Gastroenterology    SINUS SURGERY      SPINE SURGERY  ,     TONSILLECTOMY AND ADENOIDECTOMY       Social History   Social History     Substance and Sexual Activity   Alcohol Use Yes    Alcohol/week: 4.0 standard drinks of alcohol    Types: 2 Cans of beer, 2 Shots of liquor per week    Comment: social      Social History     Substance and Sexual Activity   Drug Use No     Social History     Tobacco Use   Smoking Status Former    Current packs/day: 0.00    Average packs/day: 0.5 packs/day for 10.0 years (5.0 ttl pk-yrs)    Types: Cigarettes    Start date: 1974    Quit date: 1984    Years since quittin.0   Smokeless Tobacco Never     Family History   Problem Relation Age of Onset    Heart disease Mother     Alcohol abuse Mother     Heart failure Mother     Colon cancer Father     Diabetes Brother        Meds/Allergies     (Not in a hospital admission)      No Known Allergies    Objective     BP (!) 192/91   Pulse 89   Temp 97.6 °F (36.4 °C) (Temporal)   Resp 17   Ht 5' 8\" (1.727 m)   Wt 72.1 kg (159 lb)   SpO2 100%   BMI 24.18 kg/m²       PHYSICAL EXAM    Gen: NAD  CV: RRR  CHEST: Clear  ABD: soft, NT/ND  EXT: no edema  Neuro: AAO      ASSESSMENT/PLAN:  " This is a 73 y.o. year old male here for hx of colon polyps.     PLAN:   Procedure: colonoscopy

## 2024-01-26 PROCEDURE — 88305 TISSUE EXAM BY PATHOLOGIST: CPT | Performed by: PATHOLOGY

## 2024-02-21 PROBLEM — Z12.11 SCREENING FOR COLON CANCER: Status: RESOLVED | Noted: 2018-09-17 | Resolved: 2024-02-21

## 2024-02-21 PROBLEM — Z12.5 SCREENING PSA (PROSTATE SPECIFIC ANTIGEN): Status: RESOLVED | Noted: 2020-10-20 | Resolved: 2024-02-21

## 2024-02-21 PROBLEM — Z00.00 MEDICARE ANNUAL WELLNESS VISIT, SUBSEQUENT: Status: RESOLVED | Noted: 2018-03-19 | Resolved: 2024-02-21

## 2024-02-25 DIAGNOSIS — I10 ESSENTIAL HYPERTENSION: ICD-10-CM

## 2024-02-26 RX ORDER — AMLODIPINE BESYLATE 5 MG/1
TABLET ORAL
Qty: 90 TABLET | Refills: 1 | Status: SHIPPED | OUTPATIENT
Start: 2024-02-26

## 2024-07-05 DIAGNOSIS — I10 ESSENTIAL HYPERTENSION: ICD-10-CM

## 2024-07-05 DIAGNOSIS — E78.5 HYPERLIPIDEMIA, UNSPECIFIED HYPERLIPIDEMIA TYPE: ICD-10-CM

## 2024-07-05 DIAGNOSIS — K21.9 GASTROESOPHAGEAL REFLUX DISEASE WITHOUT ESOPHAGITIS: ICD-10-CM

## 2024-07-05 RX ORDER — SIMVASTATIN 20 MG
20 TABLET ORAL DAILY
Qty: 100 TABLET | Refills: 0 | Status: SHIPPED | OUTPATIENT
Start: 2024-07-05

## 2024-07-05 RX ORDER — METOPROLOL SUCCINATE 50 MG/1
TABLET, EXTENDED RELEASE ORAL
Qty: 100 TABLET | Refills: 0 | Status: SHIPPED | OUTPATIENT
Start: 2024-07-05

## 2024-07-05 RX ORDER — FAMOTIDINE 20 MG/1
TABLET, FILM COATED ORAL
Qty: 100 TABLET | Refills: 0 | Status: SHIPPED | OUTPATIENT
Start: 2024-07-05

## 2024-08-07 ENCOUNTER — HOSPITAL ENCOUNTER (OUTPATIENT)
Dept: RADIOLOGY | Facility: HOSPITAL | Age: 74
Discharge: HOME | End: 2024-08-07
Attending: ORTHOPAEDIC SURGERY
Payer: COMMERCIAL

## 2024-08-07 ENCOUNTER — OFFICE VISIT (OUTPATIENT)
Dept: OBGYN CLINIC | Facility: HOSPITAL | Age: 74
End: 2024-08-07
Payer: COMMERCIAL

## 2024-08-07 VITALS
SYSTOLIC BLOOD PRESSURE: 194 MMHG | DIASTOLIC BLOOD PRESSURE: 78 MMHG | WEIGHT: 164 LBS | HEIGHT: 68 IN | BODY MASS INDEX: 24.86 KG/M2 | HEART RATE: 65 BPM

## 2024-08-07 DIAGNOSIS — M79.645 THUMB PAIN, LEFT: Primary | ICD-10-CM

## 2024-08-07 DIAGNOSIS — M79.645 THUMB PAIN, LEFT: ICD-10-CM

## 2024-08-07 DIAGNOSIS — M79.645 FINGER PAIN, LEFT: ICD-10-CM

## 2024-08-07 PROCEDURE — 99203 OFFICE O/P NEW LOW 30 MIN: CPT | Performed by: ORTHOPAEDIC SURGERY

## 2024-08-07 PROCEDURE — 73130 X-RAY EXAM OF HAND: CPT

## 2024-08-07 NOTE — PROGRESS NOTES
ASSESSMENT/PLAN:    Assessment:   Left index finger ulnar lateral band pain at the PIP joint, small ossicle appreciated on x-ray    Plan:   It was discussed with the patient that we will order a dynamic ultrasound of the finger to evaluate for true subluxation over the ossicle that was seen on x-ray  As discussed that we could surgically remove the ossicle which would help with the pain and discomfort versus is there inflammation from the tendon being irritated on that side  We discussed we could provide a cortisone injection and therapy if there is minimal subluxation  He will follow-up with us after ultrasound to go over test results    Follow Up:  After Testing    To Do Next Visit:  Reevaluation          _____________________________________________________  CHIEF COMPLAINT:  No chief complaint on file.        SUBJECTIVE:  Arnulfo Alexandra is a 74 y.o. male who presents left hand index finger pain. He states it started in April when he was playing golf he felt some pain over the index finger.  He states that when he is holding the golf club, there will be minimal pain and discomfort.  He states there is at times where he has difficulty bending the finger down into a full fist.  Other times it will be okay.  He denies any injuries or trauma to the area.  He denies any numbness or tingling.    PAST MEDICAL HISTORY:  Past Medical History:   Diagnosis Date    Arthritis Unknown    Colon polyp     Ear problems     GERD (gastroesophageal reflux disease) Jan. 1995    Hyperlipidemia     Hypertension     Shingles Jan. 1967    Sinusitis     Tonsillitis        PAST SURGICAL HISTORY:  Past Surgical History:   Procedure Laterality Date    BACK SURGERY      HAND SURGERY      Incision Tendon Sheath Of a Finger    [place] COLONOSCOPY FLX DX W/COLLJ SPEC WHEN PFRMD N/A 01/15/2019    Procedure: COLONOSCOPY;  Surgeon: Lucas Ngo MD;  Location: [facility];  Service: Gastroenterology    SINUS SURGERY      SPINE SURGERY  1983, 2010     TONSILLECTOMY AND ADENOIDECTOMY         FAMILY HISTORY:  Family History   Problem Relation Age of Onset    Heart disease Mother     Alcohol abuse Mother     Heart failure Mother     Colon cancer Father     Diabetes Brother        SOCIAL HISTORY:  Social History     Tobacco Use    Smoking status: Former     Current packs/day: 0.00     Average packs/day: 0.5 packs/day for 10.0 years (5.0 ttl pk-yrs)     Types: Cigarettes     Start date: 1974     Quit date: 1984     Years since quittin.6    Smokeless tobacco: Never   Vaping Use    Vaping status: Never Used   Substance Use Topics    Alcohol use: Yes     Alcohol/week: 4.0 standard drinks of alcohol     Types: 2 Cans of beer, 2 Shots of liquor per week     Comment: social     Drug use: No       MEDICATIONS:    Current Outpatient Medications:     amLODIPine (NORVASC) 5 mg tablet, TAKE 1 TABLET DAILY, Disp: 90 tablet, Rfl: 1    Ascorbic Acid (VITAMIN C) 500 MG CAPS, Take 1 capsule by mouth daily, Disp: , Rfl:     diclofenac sodium (VOLTAREN) 1 %, Apply 2 g topically 4 (four) times a day, Disp: 1 Tube, Rfl: 2    famotidine (PEPCID) 20 mg tablet, TAKE 1 TABLET DAILY, Disp: 100 tablet, Rfl: 0    metoprolol succinate (TOPROL-XL) 50 mg 24 hr tablet, TAKE 1 TABLET DAILY, Disp: 100 tablet, Rfl: 0    Multiple Vitamins-Minerals (MULTIVITAMIN ADULTS 50+ PO), Take 1 tablet by mouth daily, Disp: , Rfl:     simvastatin (ZOCOR) 20 mg tablet, TAKE 1 TABLET DAILY, Disp: 100 tablet, Rfl: 0    ALLERGIES:  No Known Allergies    REVIEW OF SYSTEMS:  Pertinent items are noted in HPI.  A comprehensive review of systems was negative.    LABS:  HgA1c:   Lab Results   Component Value Date    HGBA1C 5.3 2023     BMP:   Lab Results   Component Value Date    CALCIUM 9.5 2023     2017    K 4.5 2023    CO2 26 2023     2023    BUN 16 2023    CREATININE 0.91 2023       _____________________________________________________  PHYSICAL  "EXAMINATION:  Vital signs: BP (!) 194/78   Pulse 65   Ht 5' 8\" (1.727 m)   Wt 74.4 kg (164 lb)   BMI 24.94 kg/m²   General: well developed and well nourished, alert, oriented times 3, and appears comfortable  Psychiatric: Normal  HEENT: Trachea Midline, No torticollis  Cardiovascular: No discernable arrhythmia  Pulmonary: No wheezing or stridor  Abdomen: No rebound or guarding  Extremities: No peripheral edema  Skin: No masses, erythema, lacerations, fluctation, ulcerations  Neurovascular: Sensation Intact to the Median, Ulnar, Radial Nerve, Motor Intact to the Median, Ulnar, Radial Nerve, and Pulses Intact    MUSCULOSKELETAL EXAMINATION:  Left index finger  No erythema edema or ecchymosis noted, skin is warm to touch  Tenderness to palpation over the ulnar side of the index finger proximal phalanx by the PIP joint  He is able to make a full composite fist but will note some pain while doing so  MCP joint and PIP joint are stable with ligamentous testing  Volar and dorsal capsules are intact  Negative Lele's test    _____________________________________________________  STUDIES REVIEWED:  Images were reviewed in PACS by Dr. Conn and demonstrate: X-rays of the left hand demonstrate a small ossicle appreciated over the ulnar side of the proximal phalanx near the PIP joint.  No acute fractures or dislocations noted.      PROCEDURES PERFORMED:  Procedures  No Procedures performed today    Scribe Attestation      I,:  Elan Mcmahon PA-C am acting as a scribe while in the presence of the attending physician.:       I,:  Frank Conn MD personally performed the services described in this documentation    as scribed in my presence.:             "

## 2024-08-20 ENCOUNTER — ESTABLISHED COMPREHENSIVE EXAM (OUTPATIENT)
Dept: URBAN - METROPOLITAN AREA CLINIC 6 | Facility: CLINIC | Age: 74
End: 2024-08-20

## 2024-08-20 DIAGNOSIS — H43.812: ICD-10-CM

## 2024-08-20 DIAGNOSIS — D31.32: ICD-10-CM

## 2024-08-20 DIAGNOSIS — H52.13: ICD-10-CM

## 2024-08-20 DIAGNOSIS — H25.813: ICD-10-CM

## 2024-08-20 PROCEDURE — 92015 DETERMINE REFRACTIVE STATE: CPT

## 2024-08-20 PROCEDURE — 92014 COMPRE OPH EXAM EST PT 1/>: CPT

## 2024-08-20 PROCEDURE — 92250 FUNDUS PHOTOGRAPHY W/I&R: CPT

## 2024-08-20 ASSESSMENT — VISUAL ACUITY
OU_CC: J1
OS_CC: 20/20-2
OD_CC: 20/25

## 2024-08-20 ASSESSMENT — TONOMETRY
OS_IOP_MMHG: 19
OD_IOP_MMHG: 17

## 2024-08-23 DIAGNOSIS — I10 ESSENTIAL HYPERTENSION: ICD-10-CM

## 2024-08-23 RX ORDER — AMLODIPINE BESYLATE 5 MG/1
TABLET ORAL
Qty: 90 TABLET | Refills: 1 | Status: SHIPPED | OUTPATIENT
Start: 2024-08-23

## 2024-09-03 ENCOUNTER — HOSPITAL ENCOUNTER (OUTPATIENT)
Dept: ULTRASOUND IMAGING | Facility: HOSPITAL | Age: 74
Discharge: HOME | End: 2024-09-03
Payer: COMMERCIAL

## 2024-09-03 DIAGNOSIS — M79.645 FINGER PAIN, LEFT: ICD-10-CM

## 2024-09-03 PROCEDURE — 76882 US LMTD JT/FCL EVL NVASC XTR: CPT

## 2024-09-25 ENCOUNTER — OFFICE VISIT (OUTPATIENT)
Dept: OBGYN CLINIC | Facility: HOSPITAL | Age: 74
End: 2024-09-25
Payer: COMMERCIAL

## 2024-09-25 VITALS — BODY MASS INDEX: 25.49 KG/M2 | HEIGHT: 68 IN | WEIGHT: 168.2 LBS

## 2024-09-25 DIAGNOSIS — M79.645 FINGER PAIN, LEFT: Primary | ICD-10-CM

## 2024-09-25 DIAGNOSIS — M77.8 TENDONITIS OF FINGER: ICD-10-CM

## 2024-09-25 PROCEDURE — 99213 OFFICE O/P EST LOW 20 MIN: CPT | Performed by: ORTHOPAEDIC SURGERY

## 2024-09-25 PROCEDURE — 20600 DRAIN/INJ JOINT/BURSA W/O US: CPT | Performed by: ORTHOPAEDIC SURGERY

## 2024-09-25 RX ADMIN — LIDOCAINE HYDROCHLORIDE 0.5 ML: 10 INJECTION, SOLUTION EPIDURAL; INFILTRATION; INTRACAUDAL; PERINEURAL at 09:30

## 2024-09-25 RX ADMIN — BETAMETHASONE SODIUM PHOSPHATE AND BETAMETHASONE ACETATE 6 MG: 3; 3 INJECTION, SUSPENSION INTRA-ARTICULAR; INTRALESIONAL; INTRAMUSCULAR; SOFT TISSUE at 09:30

## 2024-09-25 NOTE — PROGRESS NOTES
ASSESSMENT/PLAN:    Assessment:   Left index finger PIP pain and extensor tendonitis.    Plan:   Discussed with the patient that the US does not show sagittal band injury or subluxation.  Patient was provided with a left index PIP joint injection.  Referral placed for hand therapy.    Follow Up:  6-8  week(s)    To Do Next Visit:  recheck    _____________________________________________________  CHIEF COMPLAINT:  Chief Complaint   Patient presents with    Left Index Finger - Follow-up     US - 9/3         SUBJECTIVE:  Arnulfo Alexandra is a 74 y.o. male who presents for follow up regarding left index finger US. He states it started in April when he was playing golf he felt some pain over the index finger.  He states that when he is holding the golf club, there will be minimal pain and discomfort.  He states there is at times where he has difficulty bending the finger down into a full fist.  Other times it will be okay.  He denies any injuries or trauma to the area.  He denies any numbness or tingling .    PAST MEDICAL HISTORY:  Past Medical History:   Diagnosis Date    Arthritis Unknown    Colon polyp     Ear problems     GERD (gastroesophageal reflux disease) Jan. 1995    Hyperlipidemia     Hypertension     Shingles Jan. 1967    Sinusitis     Tonsillitis        PAST SURGICAL HISTORY:  Past Surgical History:   Procedure Laterality Date    BACK SURGERY      HAND SURGERY      Incision Tendon Sheath Of a Finger    [place] COLONOSCOPY FLX DX W/COLLJ SPEC WHEN PFRMD N/A 01/15/2019    Procedure: COLONOSCOPY;  Surgeon: Lucsa Ngo MD;  Location[facility];  Service: Gastroenterology    SINUS SURGERY      SPINE SURGERY  1983, 2010    TONSILLECTOMY AND ADENOIDECTOMY         FAMILY HISTORY:  Family History   Problem Relation Age of Onset    Heart disease Mother     Alcohol abuse Mother     Heart failure Mother     Colon cancer Father     Diabetes Brother        SOCIAL HISTORY:  Social History     Tobacco Use    Smoking status:  "Former     Current packs/day: 0.00     Average packs/day: 0.5 packs/day for 10.0 years (5.0 ttl pk-yrs)     Types: Cigarettes     Start date: 1974     Quit date: 1984     Years since quittin.7    Smokeless tobacco: Never   Vaping Use    Vaping status: Never Used   Substance Use Topics    Alcohol use: Yes     Alcohol/week: 4.0 standard drinks of alcohol     Types: 2 Cans of beer, 2 Shots of liquor per week     Comment: social     Drug use: No       MEDICATIONS:    Current Outpatient Medications:     amLODIPine (NORVASC) 5 mg tablet, TAKE 1 TABLET DAILY, Disp: 90 tablet, Rfl: 1    Ascorbic Acid (VITAMIN C) 500 MG CAPS, Take 1 capsule by mouth daily, Disp: , Rfl:     diclofenac sodium (VOLTAREN) 1 %, Apply 2 g topically 4 (four) times a day, Disp: 1 Tube, Rfl: 2    famotidine (PEPCID) 20 mg tablet, TAKE 1 TABLET DAILY, Disp: 100 tablet, Rfl: 0    metoprolol succinate (TOPROL-XL) 50 mg 24 hr tablet, TAKE 1 TABLET DAILY, Disp: 100 tablet, Rfl: 0    Multiple Vitamins-Minerals (MULTIVITAMIN ADULTS 50+ PO), Take 1 tablet by mouth daily, Disp: , Rfl:     simvastatin (ZOCOR) 20 mg tablet, TAKE 1 TABLET DAILY, Disp: 100 tablet, Rfl: 0    ALLERGIES:  No Known Allergies    REVIEW OF SYSTEMS:  Pertinent items are noted in HPI.  A comprehensive review of systems was negative.    LABS:  HgA1c:   Lab Results   Component Value Date    HGBA1C 5.3 2023     BMP:   Lab Results   Component Value Date    CALCIUM 9.5 2023     2017    K 4.5 2023    CO2 26 2023     2023    BUN 16 2023    CREATININE 0.91 2023           _____________________________________________________  PHYSICAL EXAMINATION:  Vital signs: Ht 5' 8\" (1.727 m)   Wt 76.3 kg (168 lb 3.2 oz)   BMI 25.57 kg/m²   General: well developed and well nourished, alert, oriented times 3, and appears comfortable  Psychiatric: Normal  HEENT: Trachea Midline, No torticollis  Cardiovascular: No discernable " arrhythmia  Pulmonary: No wheezing or stridor  Abdomen: No rebound or guarding  Extremities: No peripheral edema  Skin: No masses, erythema, lacerations, fluctation, ulcerations  Neurovascular: Sensation Intact to the Median, Ulnar, Radial Nerve, Motor Intact to the Median, Ulnar, Radial Nerve, and Pulses Intact    MUSCULOSKELETAL EXAMINATION:  Left index finger  No erythema edema or ecchymosis noted, skin is warm to touch  Tenderness to palpation with click over the ulnar side of the index finger proximal phalanx by the PIP joint  He is able to make a full composite fist but will note some pain while doing so  MCP joint and PIP joint are stable with ligamentous testing  Volar and dorsal capsules are intact  Negative Lele's test    _____________________________________________________  STUDIES REVIEWED:  Images were reviewed in PACS by Dr. Conn and demonstrate: X-rays of the left hand demonstrate a small ossicle appreciated over the ulnar side of the proximal phalanx near the PIP joint.  No acute fractures or dislocations noted   US left index finger demonstrates no evidence of sagittal band injury       PROCEDURES PERFORMED:  Small joint arthrocentesis: L index PIP  Universal Protocol:  procedure performed by consultantConsent: Verbal consent obtained.  Consent given by: patient  Patient understanding: patient states understanding of the procedure being performed  Site marked: the operative site was marked  Patient identity confirmed: verbally with patient  Supporting Documentation  Indications: pain   Procedure Details  Location: index finger - L index PIP  Preparation: Patient was prepped and draped in the usual sterile fashion  Needle size: 25 G  Ultrasound guidance: no  Approach: radial  Medications administered: 6 mg betamethasone acetate-betamethasone sodium phosphate 6 (3-3) mg/mL; 0.5 mL lidocaine (PF) 1 %    Patient tolerance: patient tolerated the procedure well with no immediate  complications  Dressing:  Sterile dressing applied           Scribe Attestation      I,:  Malika Maat MA am acting as a scribe while in the presence of the attending physician.:       I,:  Frank Conn MD personally performed the services described in this documentation    as scribed in my presence.:

## 2024-09-30 RX ORDER — BETAMETHASONE SODIUM PHOSPHATE AND BETAMETHASONE ACETATE 3; 3 MG/ML; MG/ML
6 INJECTION, SUSPENSION INTRA-ARTICULAR; INTRALESIONAL; INTRAMUSCULAR; SOFT TISSUE
Status: COMPLETED | OUTPATIENT
Start: 2024-09-25 | End: 2024-09-25

## 2024-09-30 RX ORDER — LIDOCAINE HYDROCHLORIDE 10 MG/ML
0.5 INJECTION, SOLUTION EPIDURAL; INFILTRATION; INTRACAUDAL; PERINEURAL
Status: COMPLETED | OUTPATIENT
Start: 2024-09-25 | End: 2024-09-25

## 2024-10-14 DIAGNOSIS — E78.5 HYPERLIPIDEMIA, UNSPECIFIED HYPERLIPIDEMIA TYPE: ICD-10-CM

## 2024-10-14 DIAGNOSIS — K21.9 GASTROESOPHAGEAL REFLUX DISEASE WITHOUT ESOPHAGITIS: ICD-10-CM

## 2024-10-14 DIAGNOSIS — I10 ESSENTIAL HYPERTENSION: ICD-10-CM

## 2024-10-14 NOTE — TELEPHONE ENCOUNTER
Chart reviewed. No further action needed    Refill request for simvastatin, metoprol, famotidine from [facility]

## 2024-10-15 RX ORDER — METOPROLOL SUCCINATE 50 MG/1
50 TABLET, EXTENDED RELEASE ORAL DAILY
Qty: 90 TABLET | Refills: 1 | Status: SHIPPED | OUTPATIENT
Start: 2024-10-15 | End: 2024-10-16 | Stop reason: SDUPTHER

## 2024-10-15 RX ORDER — SIMVASTATIN 20 MG
20 TABLET ORAL DAILY
Qty: 90 TABLET | Refills: 1 | Status: SHIPPED | OUTPATIENT
Start: 2024-10-15 | End: 2024-10-16 | Stop reason: SDUPTHER

## 2024-10-15 RX ORDER — FAMOTIDINE 20 MG/1
20 TABLET, FILM COATED ORAL DAILY
Qty: 90 TABLET | Refills: 1 | Status: SHIPPED | OUTPATIENT
Start: 2024-10-15 | End: 2024-10-16 | Stop reason: SDUPTHER

## 2024-10-16 DIAGNOSIS — K21.9 GASTROESOPHAGEAL REFLUX DISEASE WITHOUT ESOPHAGITIS: ICD-10-CM

## 2024-10-16 DIAGNOSIS — E78.5 HYPERLIPIDEMIA, UNSPECIFIED HYPERLIPIDEMIA TYPE: ICD-10-CM

## 2024-10-16 DIAGNOSIS — I10 ESSENTIAL HYPERTENSION: ICD-10-CM

## 2024-10-16 RX ORDER — FAMOTIDINE 20 MG/1
20 TABLET, FILM COATED ORAL DAILY
Qty: 90 TABLET | Refills: 1 | Status: SHIPPED | OUTPATIENT
Start: 2024-10-16

## 2024-10-16 RX ORDER — METOPROLOL SUCCINATE 50 MG/1
50 TABLET, EXTENDED RELEASE ORAL DAILY
Qty: 90 TABLET | Refills: 1 | Status: SHIPPED | OUTPATIENT
Start: 2024-10-16

## 2024-10-16 RX ORDER — AMLODIPINE BESYLATE 5 MG/1
5 TABLET ORAL DAILY
Qty: 90 TABLET | Refills: 1 | Status: SHIPPED | OUTPATIENT
Start: 2024-10-16

## 2024-10-16 RX ORDER — SIMVASTATIN 20 MG
20 TABLET ORAL DAILY
Qty: 90 TABLET | Refills: 1 | Status: SHIPPED | OUTPATIENT
Start: 2024-10-16

## 2024-11-17 ENCOUNTER — RA CDI HCC (OUTPATIENT)
Dept: OTHER | Facility: HOSPITAL | Age: 74
End: 2024-11-17

## 2024-11-25 ENCOUNTER — OFFICE VISIT (OUTPATIENT)
Dept: INTERNAL MEDICINE CLINIC | Facility: CLINIC | Age: 74
End: 2024-11-25
Payer: COMMERCIAL

## 2024-11-25 VITALS
SYSTOLIC BLOOD PRESSURE: 172 MMHG | HEART RATE: 70 BPM | WEIGHT: 165.2 LBS | OXYGEN SATURATION: 99 % | BODY MASS INDEX: 25.12 KG/M2 | DIASTOLIC BLOOD PRESSURE: 84 MMHG

## 2024-11-25 DIAGNOSIS — I10 PRIMARY HYPERTENSION: ICD-10-CM

## 2024-11-25 DIAGNOSIS — Z13.6 SCREENING FOR CARDIOVASCULAR CONDITION: ICD-10-CM

## 2024-11-25 DIAGNOSIS — Z00.00 MEDICARE ANNUAL WELLNESS VISIT, SUBSEQUENT: Primary | ICD-10-CM

## 2024-11-25 DIAGNOSIS — M79.645 THUMB PAIN, LEFT: ICD-10-CM

## 2024-11-25 DIAGNOSIS — M54.2 NECK PAIN: ICD-10-CM

## 2024-11-25 DIAGNOSIS — E78.5 HYPERLIPIDEMIA, UNSPECIFIED HYPERLIPIDEMIA TYPE: ICD-10-CM

## 2024-11-25 DIAGNOSIS — Z12.5 SCREENING PSA (PROSTATE SPECIFIC ANTIGEN): ICD-10-CM

## 2024-11-25 DIAGNOSIS — R73.01 IFG (IMPAIRED FASTING GLUCOSE): ICD-10-CM

## 2024-11-25 PROCEDURE — 99214 OFFICE O/P EST MOD 30 MIN: CPT | Performed by: INTERNAL MEDICINE

## 2024-11-25 PROCEDURE — G0439 PPPS, SUBSEQ VISIT: HCPCS | Performed by: INTERNAL MEDICINE

## 2024-11-25 NOTE — ASSESSMENT & PLAN NOTE
Chronic intermittent neck pain secondary to advanced cervical degenerative arthritis we did review the x-ray from 8 years ago showing severe arthritis patient is managing with Aleve as needed I did explain to the patient it would be better to use Tylenol arthritis at nighttime for mild to moderate symptoms and if he develops moderate to severe to reserve the Aleve as needed take with food and plenty of water I have also given the patient stretching exercises for the neck if his symptoms are worsening he is to notify me for consideration of formal physical therapy and possible injections no radicular symptoms

## 2024-11-25 NOTE — ASSESSMENT & PLAN NOTE
Hypertension - controlled, I have counseled patient following healthy balance diet, I would like the patient reduce sodium, exercise routinely, I would like the patient continued the med current medical regiment and we will continue to monitor.  Patient does have elements of whitecoat hypertension he has brought his home readings which are all in the normal range he will continue to monitor the home readings

## 2024-11-25 NOTE — PROGRESS NOTES
Name: Arnulfo Alexandra      : 1950      MRN: [id number]  Encounter Provider: Ronaldo Flores DO  Encounter Date: 2024   Encounter department: [facility]    Assessment & Plan  Medicare annual wellness visit, subsequent  Assessment and plan 1.  Medicare subsequent annual wellness examination overall the patient is clinically stable and doing well, we encouraged the patient to follow a healthy and balanced diet.  We recommend that the patient exercise routinely approximately 30 minutes 5 times per week .  We have reviewed the patient's vaccines and have made recommendations for updates if necessary patient is up-to-date with the flu vaccine high-dose, COVID booster, pneumonia shot and RSV vaccine; colonoscopy up-to-date    .  We will be ordering screening laboratories which are age appropriate.  Return to the office in   6 months   call if any problems.  Orders:    Lipid Panel with Direct LDL reflex; Future    Primary hypertension  Hypertension - controlled, I have counseled patient following healthy balance diet, I would like the patient reduce sodium, exercise routinely, I would like the patient continued the med current medical regiment and we will continue to monitor.  Patient does have elements of whitecoat hypertension he has brought his home readings which are all in the normal range he will continue to monitor the home readings       Hyperlipidemia, unspecified hyperlipidemia type  Hyperlipidemia controlled continue with current medical regiment recommend a low-cholesterol diet and recommend routine exercise we will continue to monitor the progress.  Continue Zocor 20 mg once daily  Orders:    CT coronary calcium score; Future    Comprehensive metabolic panel; Future    Lipid Panel with Direct LDL reflex; Future  Strong family history of coronary artery disease we will check coronary artery calcium score patient is asymptomatic no chest pain no shortness of  breath  Screening for cardiovascular condition    Orders:    CT coronary calcium score; Future    Screening PSA (prostate specific antigen)    Orders:    PSA, Total Screen; Future    IFG (impaired fasting glucose)  I have counselled the pt to follow a healthy and balanced diet ,and recommend routine exercise.  Reduce carbohydrates and sweets  Orders:    Hemoglobin A1C; Future    Neck pain  Chronic intermittent neck pain secondary to advanced cervical degenerative arthritis we did review the x-ray from 8 years ago showing severe arthritis patient is managing with Aleve as needed I did explain to the patient it would be better to use Tylenol arthritis at nighttime for mild to moderate symptoms and if he develops moderate to severe to reserve the Aleve as needed take with food and plenty of water I have also given the patient stretching exercises for the neck if his symptoms are worsening he is to notify me for consideration of formal physical therapy and possible injections no radicular symptoms       Thumb pain, left  Related to arthritis range of motion exercises, as needed Tylenol as above as needed Aleve     RTO in 6 months call if any problems     Preventive health issues were discussed with patient, and age appropriate screening tests were ordered as noted in patient's After Visit Summary. Personalized health advice and appropriate referrals for health education or preventive services given if needed, as noted in patient's After Visit Summary.    History of Present Illness     HPI 74-year old male coming in for a follow up visit regarding hypertension, hyperlipidemia, impaired fasting glucose, neck pain, cervical degenerative arthritis, left thumb pain related to degenerative arthritis; the patient reports me compliant taking medications without untoward side effects the.  The patient is here to review his medical condition, update me on the medical condition and the patient reports me no hospitalizations and no  ER visits.  No injuries no illnesses here to review laboratories in detail he does report to me his brother recently has found to have a very elevated coronary artery calcium score he also reports me that his mom had heart failure patient is very interested in the coronary artery calcium score to determine if he has any underlying coronary artery disease currently asymptomatic no chest pain no shortness of breath no palpitations.  He is trying to follow healthy balanced diet and remains very active he does report to me neck pain intermittent and stiffness along with thumb and finger arthritis he is managing with Aleve he would like to know the best treatment plan but I did talk to him about Tylenol arthritis and stretching and to reserve the Aleve for when the symptoms are moderate in nature fortunately these medicines have been helpful thus far no radicular symptoms I did review the x-ray from many years ago with him.  Neck pain at night with the arthritis using aleve ,   Patient Care Team:  Ronaldo Flores DO as PCP - General  DO Dano Rodriguez MD Christopher Mathur, DO Sinan Kutty, MD as Endoscopist    Review of Systems   Constitutional:  Negative for activity change, appetite change and unexpected weight change.   HENT:  Negative for congestion and postnasal drip.    Eyes:  Negative for visual disturbance.   Respiratory:  Negative for cough and shortness of breath.    Cardiovascular:  Negative for chest pain.   Gastrointestinal:  Negative for abdominal pain, diarrhea, nausea and vomiting.   Musculoskeletal:  Positive for arthralgias and neck pain.   Neurological:  Negative for dizziness, light-headedness and headaches.   Hematological:  Negative for adenopathy.     Medical History Reviewed by provider this encounter:       Annual Wellness Visit Questionnaire blood pressure on average 119 120-130 over 60s to 70s on average when checking at home.  Arnulfo is here for his Subsequent  Wellness visit.     Health Risk Assessment:   Patient rates overall health as very good. Patient feels that their physical health rating is same. Patient is very satisfied with their life. Eyesight was rated as same. Hearing was rated as same. Patient feels that their emotional and mental health rating is same. Patients states they are never, rarely angry. Patient states they are never, rarely unusually tired/fatigued. Pain experienced in the last 7 days has been some. Patient's pain rating has been 4/10. Patient states that he has experienced no weight loss or gain in last 6 months.     Depression Screening:   PHQ-2 Score: 0      Fall Risk Screening:   In the past year, patient has experienced: no history of falling in past year      Home Safety:  Patient does not have trouble with stairs inside or outside of their home. Patient has working smoke alarms and has working carbon monoxide detector. Home safety hazards include: none.     Nutrition:   Current diet is Regular.     Medications:   Patient is currently taking over-the-counter supplements. OTC medications include: see medication list. Patient is able to manage medications.     Activities of Daily Living (ADLs)/Instrumental Activities of Daily Living (IADLs):   Walk and transfer into and out of bed and chair?: Yes  Dress and groom yourself?: Yes    Bathe or shower yourself?: Yes    Feed yourself? Yes  Do your laundry/housekeeping?: Yes  Manage your money, pay your bills and track your expenses?: Yes  Make your own meals?: Yes    Do your own shopping?: Yes    Previous Hospitalizations:   Any hospitalizations or ED visits within the last 12 months?: No      Advance Care Planning:   Living will: Yes    Durable POA for healthcare: Yes    Advanced directive: Yes      Cognitive Screening:   Provider or family/friend/caregiver concerned regarding cognition?: No    PREVENTIVE SCREENINGS      Cardiovascular Screening:    General: Screening Not Indicated and History  Lipid Disorder      Colorectal Cancer Screening:     General: Screening Current      Prostate Cancer Screening:    General: Screening Not Indicated      Abdominal Aortic Aneurysm (AAA) Screening:    Risk factors include: tobacco use        Lung Cancer Screening:     General: Screening Not Indicated      Hepatitis C Screening:    General: Screening Current    Screening, Brief Intervention, and Referral to Treatment (SBIRT)    Screening  Typical number of drinks in a day: 0  Typical number of drinks in a week: 3  Interpretation: Low risk drinking behavior.    AUDIT-C Screenin) How often did you have a drink containing alcohol in the past year? 2 to 4 times a month  2) How many drinks did you have on a typical day when you were drinking in the past year? 0  3) How often did you have 6 or more drinks on one occasion in the past year? never    AUDIT-C Score: 2  Interpretation: Score 0-3 (male): Negative screen for alcohol misuse    Single Item Drug Screening:  How often have you used an illegal drug (including marijuana) or a prescription medication for non-medical reasons in the past year? never    Single Item Drug Screen Score: 0  Interpretation: Negative screen for possible drug use disorder    Social Drivers of Health     Financial Resource Strain: Low Risk  (11/15/2023)    Overall Financial Resource Strain (CARDIA)     Difficulty of Paying Living Expenses: Not hard at all   Food Insecurity: No Food Insecurity (2024)    Hunger Vital Sign     Worried About Running Out of Food in the Last Year: Never true     Ran Out of Food in the Last Year: Never true   Transportation Needs: No Transportation Needs (2024)    PRAPARE - Transportation     Lack of Transportation (Medical): No     Lack of Transportation (Non-Medical): No   Housing Stability: Unknown (2024)    Housing Stability Vital Sign     Unable to Pay for Housing in the Last Year: No     Homeless in the Last Year: No   Utilities: Not At Risk  (11/18/2024)    [facility] Utilities     Threatened with loss of utilities: No     No results found.    Objective   BP (!) 172/84 (BP Location: Left arm, Patient Position: Sitting, Cuff Size: Standard)   Pulse 70   Wt 74.9 kg (165 lb 3.2 oz)   SpO2 99%   BMI 25.12 kg/m²     Physical Exam  Vitals and nursing note reviewed.   Constitutional:       General: He is not in acute distress.     Appearance: Normal appearance. He is well-developed. He is not ill-appearing, toxic-appearing or diaphoretic.   HENT:      Head: Normocephalic and atraumatic.      Right Ear: External ear normal.      Left Ear: External ear normal.      Nose: Nose normal.   Eyes:      Pupils: Pupils are equal, round, and reactive to light.   Cardiovascular:      Rate and Rhythm: Normal rate and regular rhythm.      Heart sounds: Normal heart sounds. No murmur heard.  Pulmonary:      Effort: Pulmonary effort is normal.      Breath sounds: Normal breath sounds.   Abdominal:      General: There is no distension.      Palpations: Abdomen is soft.      Tenderness: There is no abdominal tenderness. There is no guarding.   Neurological:      Mental Status: He is alert.       Limited flexion extension sidebending and rotation of the cervical spine, mild crepitus with flexion extension of the second finger digit no synovitis

## 2024-11-25 NOTE — ASSESSMENT & PLAN NOTE
Related to arthritis range of motion exercises, as needed Tylenol as above as needed Aleve     RTO in 6 months call if any problems

## 2024-11-25 NOTE — ASSESSMENT & PLAN NOTE
Hyperlipidemia controlled continue with current medical regiment recommend a low-cholesterol diet and recommend routine exercise we will continue to monitor the progress.  Continue Zocor 20 mg once daily  Orders:    CT coronary calcium score; Future    Comprehensive metabolic panel; Future    Lipid Panel with Direct LDL reflex; Future  Strong family history of coronary artery disease we will check coronary artery calcium score patient is asymptomatic no chest pain no shortness of breath

## 2024-11-25 NOTE — PATIENT INSTRUCTIONS
Medicare Preventive Visit Patient Instructions  Thank you for completing your Welcome to Medicare Visit or Medicare Annual Wellness Visit today. Your next wellness visit will be due in one year (11/26/2025).  The screening/preventive services that you may require over the next 5-10 years are detailed below. Some tests may not apply to you based off risk factors and/or age. Screening tests ordered at today's visit but not completed yet may show as past due. Also, please note that scanned in results may not display below.  Preventive Screenings:  Service Recommendations Previous Testing/Comments   Colorectal Cancer Screening  Colonoscopy    Fecal Occult Blood Test (FOBT)/Fecal Immunochemical Test (FIT)  Fecal DNA/Cologuard Test  Flexible Sigmoidoscopy Age: 45-75 years old   Colonoscopy: every 10 years (May be performed more frequently if at higher risk)  OR  FOBT/FIT: every 1 year  OR  Cologuard: every 3 years  OR  Sigmoidoscopy: every 5 years  Screening may be recommended earlier than age 45 if at higher risk for colorectal cancer. Also, an individualized decision between you and your healthcare provider will decide whether screening between the ages of 76-85 would be appropriate. Colonoscopy: 01/24/2024  FOBT/FIT: Not on file  Cologuard: Not on file  Sigmoidoscopy: Not on file          Prostate Cancer Screening Individualized decision between patient and health care provider in men between ages of 55-69   Medicare will cover every 12 months beginning on the day after your 50th birthday PSA: 1.21 ng/mL           Hepatitis C Screening Once for adults born between 1945 and 1965  More frequently in patients at high risk for Hepatitis C Hep C Antibody: 09/21/2017        Diabetes Screening 1-2 times per year if you're at risk for diabetes or have pre-diabetes Fasting glucose: No results in last 5 years (No results in last 5 years)  A1C: 5.3 % of total Hgb (11/18/2023)      Cholesterol Screening Once every 5 years if you  don't have a lipid disorder. May order more often based on risk factors. Lipid panel: 11/18/2023         Other Preventive Screenings Covered by Medicare:  Abdominal Aortic Aneurysm (AAA) Screening: covered once if your at risk. You're considered to be at risk if you have a family history of AAA or a male between the age of 65-75 who smoking at least 100 cigarettes in your lifetime.  Lung Cancer Screening: covers low dose CT scan once per year if you meet all of the following conditions: (1) Age 55-77; (2) No signs or symptoms of lung cancer; (3) Current smoker or have quit smoking within the last 15 years; (4) You have a tobacco smoking history of at least 20 pack years (packs per day x number of years you smoked); (5) You get a written order from a healthcare provider.  Glaucoma Screening: covered annually if you're considered high risk: (1) You have diabetes OR (2) Family history of glaucoma OR (3)  aged 50 and older OR (4)  American aged 65 and older  Osteoporosis Screening: covered every 2 years if you meet one of the following conditions: (1) Have a vertebral abnormality; (2) On glucocorticoid therapy for more than 3 months; (3) Have primary hyperparathyroidism; (4) On osteoporosis medications and need to assess response to drug therapy.  HIV Screening: covered annually if you're between the age of 15-65. Also covered annually if you are younger than 15 and older than 65 with risk factors for HIV infection. For pregnant patients, it is covered up to 3 times per pregnancy.    Immunizations:  Immunization Recommendations   Influenza Vaccine Annual influenza vaccination during flu season is recommended for all persons aged >= 6 months who do not have contraindications   Pneumococcal Vaccine   * Pneumococcal conjugate vaccine = PCV13 (Prevnar 13), PCV15 (Vaxneuvance), PCV20 (Prevnar 20)  * Pneumococcal polysaccharide vaccine = PPSV23 (Pneumovax) Adults 19-65 yo with certain risk factors or  if 65+ yo  If never received any pneumonia vaccine: recommend Prevnar 20 (PCV20)  Give PCV20 if previously received 1 dose of PCV13 or PPSV23   Hepatitis B Vaccine 3 dose series if at intermediate or high risk (ex: diabetes, end stage renal disease, liver disease)   Respiratory syncytial virus (RSV) Vaccine - COVERED BY MEDICARE PART D  * RSVPreF3 (Arexvy) CDC recommends that adults 60 years of age and older may receive a single dose of RSV vaccine using shared clinical decision-making (SCDM)   Tetanus (Td) Vaccine - COST NOT COVERED BY MEDICARE PART B Following completion of primary series, a booster dose should be given every 10 years to maintain immunity against tetanus. Td may also be given as tetanus wound prophylaxis.   Tdap Vaccine - COST NOT COVERED BY MEDICARE PART B Recommended at least once for all adults. For pregnant patients, recommended with each pregnancy.   Shingles Vaccine (Shingrix) - COST NOT COVERED BY MEDICARE PART B  2 shot series recommended in those 19 years and older who have or will have weakened immune systems or those 50 years and older     Health Maintenance Due:      Topic Date Due   • Colorectal Cancer Screening  01/22/2031   • Hepatitis C Screening  Completed     Immunizations Due:  There are no preventive care reminders to display for this patient.  Advance Directives   What are advance directives?  Advance directives are legal documents that state your wishes and plans for medical care. These plans are made ahead of time in case you lose your ability to make decisions for yourself. Advance directives can apply to any medical decision, such as the treatments you want, and if you want to donate organs.   What are the types of advance directives?  There are many types of advance directives, and each state has rules about how to use them. You may choose a combination of any of the following:  Living will:  This is a written record of the treatment you want. You can also choose which  treatments you do not want, which to limit, and which to stop at a certain time. This includes surgery, medicine, IV fluid, and tube feedings.   Durable power of  for healthcare (DPAHC):  This is a written record that states who you want to make healthcare choices for you when you are unable to make them for yourself. This person, called a proxy, is usually a family member or a friend. You may choose more than 1 proxy.  Do not resuscitate (DNR) order:  A DNR order is used in case your heart stops beating or you stop breathing. It is a request not to have certain forms of treatment, such as CPR. A DNR order may be included in other types of advance directives.  Medical directive:  This covers the care that you want if you are in a coma, near death, or unable to make decisions for yourself. You can list the treatments you want for each condition. Treatment may include pain medicine, surgery, blood transfusions, dialysis, IV or tube feedings, and a ventilator (breathing machine).  Values history:  This document has questions about your views, beliefs, and how you feel and think about life. This information can help others choose the care that you would choose.  Why are advance directives important?  An advance directive helps you control your care. Although spoken wishes may be used, it is better to have your wishes written down. Spoken wishes can be misunderstood, or not followed. Treatments may be given even if you do not want them. An advance directive may make it easier for your family to make difficult choices about your care.   Weight Management   Why it is important to manage your weight:  Being overweight increases your risk of health conditions such as heart disease, high blood pressure, type 2 diabetes, and certain types of cancer. It can also increase your risk for osteoarthritis, sleep apnea, and other respiratory problems. Aim for a slow, steady weight loss. Even a small amount of weight loss can  lower your risk of health problems.  How to lose weight safely:  A safe and healthy way to lose weight is to eat fewer calories and get regular exercise. You can lose up about 1 pound a week by decreasing the number of calories you eat by 500 calories each day.   Healthy meal plan for weight management:  A healthy meal plan includes a variety of foods, contains fewer calories, and helps you stay healthy. A healthy meal plan includes the following:  Eat whole-grain foods more often.  A healthy meal plan should contain fiber. Fiber is the part of grains, fruits, and vegetables that is not broken down by your body. Whole-grain foods are healthy and provide extra fiber in your diet. Some examples of whole-grain foods are whole-wheat breads and pastas, oatmeal, brown rice, and bulgur.  Eat a variety of vegetables every day.  Include dark, leafy greens such as spinach, kale, cierra greens, and mustard greens. Eat yellow and orange vegetables such as carrots, sweet potatoes, and winter squash.   Eat a variety of fruits every day.  Choose fresh or canned fruit (canned in its own juice or light syrup) instead of juice. Fruit juice has very little or no fiber.  Eat low-fat dairy foods.  Drink fat-free (skim) milk or 1% milk. Eat fat-free yogurt and low-fat cottage cheese. Try low-fat cheeses such as mozzarella and other reduced-fat cheeses.  Choose meat and other protein foods that are low in fat.  Choose beans or other legumes such as split peas or lentils. Choose fish, skinless poultry (chicken or turkey), or lean cuts of red meat (beef or pork). Before you cook meat or poultry, cut off any visible fat.   Use less fat and oil.  Try baking foods instead of frying them. Add less fat, such as margarine, sour cream, regular salad dressing and mayonnaise to foods. Eat fewer high-fat foods. Some examples of high-fat foods include french fries, doughnuts, ice cream, and cakes.  Eat fewer sweets.  Limit foods and drinks that are  high in sugar. This includes candy, cookies, regular soda, and sweetened drinks.  Exercise:  Exercise at least 30 minutes per day on most days of the week. Some examples of exercise include walking, biking, dancing, and swimming. You can also fit in more physical activity by taking the stairs instead of the elevator or parking farther away from stores. Ask your healthcare provider about the best exercise plan for you.      © Copyright [employer] 2018 Information is for End User's use only and may not be sold, redistributed or otherwise used for commercial purposes. All illustrations and images included in CareNotes® are the copyrighted property of [employer].A[employer]M., Inc. or [employer]

## 2024-11-27 ENCOUNTER — RESULTS FOLLOW-UP (OUTPATIENT)
Dept: INTERNAL MEDICINE CLINIC | Facility: CLINIC | Age: 74
End: 2024-11-27

## 2024-11-27 LAB
ALBUMIN SERPL-MCNC: 4.4 G/DL (ref 3.6–5.1)
ALBUMIN/GLOB SERPL: 1.8 (CALC) (ref 1–2.5)
ALP SERPL-CCNC: 81 U/L (ref 35–144)
ALT SERPL-CCNC: 20 U/L (ref 9–46)
AST SERPL-CCNC: 26 U/L (ref 10–35)
BILIRUB SERPL-MCNC: 0.7 MG/DL (ref 0.2–1.2)
BUN SERPL-MCNC: 23 MG/DL (ref 7–25)
BUN/CREAT SERPL: ABNORMAL (CALC) (ref 6–22)
CALCIUM SERPL-MCNC: 9.4 MG/DL (ref 8.6–10.3)
CHLORIDE SERPL-SCNC: 100 MMOL/L (ref 98–110)
CHOLEST SERPL-MCNC: 190 MG/DL
CHOLEST/HDLC SERPL: 2.3 (CALC)
CO2 SERPL-SCNC: 30 MMOL/L (ref 20–32)
CREAT SERPL-MCNC: 1.11 MG/DL (ref 0.7–1.28)
GFR/BSA.PRED SERPLBLD CYS-BASED-ARV: 69 ML/MIN/1.73M2
GLOBULIN SER CALC-MCNC: 2.5 G/DL (CALC) (ref 1.9–3.7)
GLUCOSE SERPL-MCNC: 104 MG/DL (ref 65–99)
HDLC SERPL-MCNC: 81 MG/DL
LDLC SERPL CALC-MCNC: 90 MG/DL (CALC)
NONHDLC SERPL-MCNC: 109 MG/DL (CALC)
POTASSIUM SERPL-SCNC: 4.1 MMOL/L (ref 3.5–5.3)
PROT SERPL-MCNC: 6.9 G/DL (ref 6.1–8.1)
PSA SERPL-MCNC: 1.58 NG/ML
SODIUM SERPL-SCNC: 137 MMOL/L (ref 135–146)
TRIGL SERPL-MCNC: 92 MG/DL

## 2024-12-02 DIAGNOSIS — R97.20 INCREASED PROSTATE SPECIFIC ANTIGEN (PSA) VELOCITY: Primary | ICD-10-CM

## 2024-12-04 ENCOUNTER — HOSPITAL ENCOUNTER (OUTPATIENT)
Dept: CT IMAGING | Facility: HOSPITAL | Age: 74
Discharge: HOME | End: 2024-12-04
Payer: COMMERCIAL

## 2024-12-04 DIAGNOSIS — E78.5 HYPERLIPIDEMIA, UNSPECIFIED HYPERLIPIDEMIA TYPE: ICD-10-CM

## 2024-12-04 DIAGNOSIS — Z13.6 SCREENING FOR CARDIOVASCULAR CONDITION: ICD-10-CM

## 2024-12-04 PROCEDURE — 75571 CT HRT W/O DYE W/CA TEST: CPT

## 2024-12-12 ENCOUNTER — OFFICE VISIT (OUTPATIENT)
Dept: INTERNAL MEDICINE CLINIC | Facility: CLINIC | Age: 74
End: 2024-12-12
Payer: COMMERCIAL

## 2024-12-12 ENCOUNTER — PATIENT MESSAGE (OUTPATIENT)
Dept: INTERNAL MEDICINE CLINIC | Facility: CLINIC | Age: 74
End: 2024-12-12

## 2024-12-12 VITALS
BODY MASS INDEX: 25.33 KG/M2 | SYSTOLIC BLOOD PRESSURE: 154 MMHG | HEART RATE: 73 BPM | WEIGHT: 166.6 LBS | DIASTOLIC BLOOD PRESSURE: 66 MMHG | OXYGEN SATURATION: 97 % | TEMPERATURE: 97.5 F

## 2024-12-12 DIAGNOSIS — I25.10 CAD (CORONARY ARTERY DISEASE): Primary | ICD-10-CM

## 2024-12-12 DIAGNOSIS — K76.9 HEPATIC LESION: ICD-10-CM

## 2024-12-12 DIAGNOSIS — F40.240 CLAUSTROPHOBIA: ICD-10-CM

## 2024-12-12 DIAGNOSIS — I25.10 ASCVD (ARTERIOSCLEROTIC CARDIOVASCULAR DISEASE): ICD-10-CM

## 2024-12-12 PROCEDURE — 99213 OFFICE O/P EST LOW 20 MIN: CPT

## 2024-12-12 PROCEDURE — G2211 COMPLEX E/M VISIT ADD ON: HCPCS

## 2024-12-12 RX ORDER — ROSUVASTATIN CALCIUM 20 MG/1
20 TABLET, COATED ORAL DAILY
Qty: 90 TABLET | Refills: 3 | Status: SHIPPED | OUTPATIENT
Start: 2024-12-12

## 2024-12-12 RX ORDER — LORAZEPAM 0.5 MG/1
TABLET ORAL
Qty: 1 TABLET | Refills: 0 | Status: SHIPPED | OUTPATIENT
Start: 2024-12-12

## 2024-12-12 RX ORDER — ASPIRIN 81 MG/1
81 TABLET, CHEWABLE ORAL DAILY
Qty: 90 TABLET | Refills: 3 | Status: SHIPPED | OUTPATIENT
Start: 2024-12-12

## 2024-12-12 NOTE — ASSESSMENT & PLAN NOTE
Patient's coronary calcium score 924, extremely high  Patient is completely asymptomatic, denies any chest pain, shortness of breath or diaphoresis  Patient has family history    PLAN:  Discontinue simvastatin 20 Mg  Start rosuvastatin 20 Mg  Start aspirin  Follow-up with carotid duplex  Follow-up with ankle-brachial index  Follow up with cardiology    Orders:    rosuvastatin (CRESTOR) 20 MG tablet; Take 1 tablet (20 mg total) by mouth daily    aspirin 81 mg chewable tablet; Chew 1 tablet (81 mg total) daily    Ambulatory Referral to Cardiology; Future

## 2024-12-12 NOTE — ASSESSMENT & PLAN NOTE
CT abdomen: Incidental discovery of hypodensity in the dome of the right hepatic lobe, 2.2 cm, not simple fluid and therefore indeterminate. This finding is statistically most likely to represent hemangioma or geographic hepatic steatosis, but conservative   management with follow-up hepatic MRI to definitively characterize is recommended.    PLAN:  MRI abdomen    Orders:    MRI abdomen w wo contrast; Future

## 2024-12-12 NOTE — PROGRESS NOTES
Name: Arnulfo Alexandra      : 1950      MRN: [id number]  Encounter Provider: Epifanio Avendaño MD  Encounter Date: 2024   Encounter department: [facility]  :  Assessment & Plan  CAD (coronary artery disease)  Patient's coronary calcium score 924, extremely high  Patient is completely asymptomatic, denies any chest pain, shortness of breath or diaphoresis  Patient has family history    PLAN:  Discontinue simvastatin 20 Mg  Start rosuvastatin 20 Mg  Start aspirin  Follow-up with carotid duplex  Follow-up with ankle-brachial index  Follow up with cardiology    Orders:    rosuvastatin (CRESTOR) 20 MG tablet; Take 1 tablet (20 mg total) by mouth daily    aspirin 81 mg chewable tablet; Chew 1 tablet (81 mg total) daily    Ambulatory Referral to Cardiology; Future    ASCVD (arteriosclerotic cardiovascular disease)  Lab Results   Component Value Date    CHOLESTEROL 190 2024    TRIG 92 2024    HDL 81 2024    LDLCALC 90 2024      Patient has a ASCVD score of 40%    PLAN;  Discontinue simvastatin 20 Mg  Start rosuvastatin 20 Mg  Follow-up with carotid duplex  Follow-up with ankle-brachial index    Orders:    VAS carotid complete study; Future    Ambulatory Referral to Cardiology; Future    VAS KEVYN and waveform analysis, multiple levels; Future    Hepatic lesion  CT abdomen: Incidental discovery of hypodensity in the dome of the right hepatic lobe, 2.2 cm, not simple fluid and therefore indeterminate. This finding is statistically most likely to represent hemangioma or geographic hepatic steatosis, but conservative   management with follow-up hepatic MRI to definitively characterize is recommended.    PLAN:  MRI abdomen    Orders:    MRI abdomen w wo contrast; Future    Claustrophobia    Orders:    LORazepam (Ativan) 0.5 mg tablet; 1 tablet 30 minutes prior to procedure please get a ride to and from your MRI           History of Present Illness     HPI 74-year-old male  comes to the clinic to discuss his imaging results.  Patient underwent coronary calcium imaging on 4 December, 2024.  Patient score came out to be 924 which is considered extremely high.  Patient denies any chest pain, shortness of breath or diaphoresis.  Patient does have positive cardiac family history.  Review of Systems   Constitutional:  Negative for chills and fever.   HENT:  Negative for ear pain and sore throat.    Eyes:  Negative for pain and visual disturbance.   Respiratory:  Negative for cough and shortness of breath.    Cardiovascular:  Negative for chest pain, palpitations and leg swelling.   Gastrointestinal:  Negative for abdominal pain and vomiting.   Genitourinary:  Negative for dysuria and hematuria.   Musculoskeletal:  Negative for arthralgias and back pain.   Skin:  Negative for color change and rash.   Neurological:  Negative for seizures and syncope.   All other systems reviewed and are negative.      Objective   /66 (BP Location: Right arm, Patient Position: Sitting, Cuff Size: Large)   Pulse 73   Temp 97.5 °F (36.4 °C) (Tympanic)   Wt 75.6 kg (166 lb 9.6 oz)   SpO2 97%   BMI 25.33 kg/m²      Physical Exam  Vitals and nursing note reviewed.   Constitutional:       General: He is not in acute distress.     Appearance: He is well-developed.   HENT:      Head: Normocephalic and atraumatic.      Right Ear: External ear normal.      Left Ear: External ear normal.      Mouth/Throat:      Pharynx: Oropharynx is clear.   Eyes:      Conjunctiva/sclera: Conjunctivae normal.   Cardiovascular:      Rate and Rhythm: Normal rate and regular rhythm.      Pulses: Normal pulses.      Heart sounds: Normal heart sounds. No murmur heard.  Pulmonary:      Effort: Pulmonary effort is normal. No respiratory distress.      Breath sounds: Normal breath sounds.   Abdominal:      Palpations: Abdomen is soft.      Tenderness: There is no abdominal tenderness.   Musculoskeletal:         General: No swelling.       Cervical back: Neck supple.   Skin:     General: Skin is warm and dry.      Capillary Refill: Capillary refill takes less than 2 seconds.   Neurological:      Mental Status: He is alert.   Psychiatric:         Mood and Affect: Mood normal.

## 2024-12-12 NOTE — ASSESSMENT & PLAN NOTE
Lab Results   Component Value Date    CHOLESTEROL 190 11/26/2024    TRIG 92 11/26/2024    HDL 81 11/26/2024    LDLCALC 90 11/26/2024      Patient has a ASCVD score of 40%    PLAN;  Discontinue simvastatin 20 Mg  Start rosuvastatin 20 Mg  Follow-up with carotid duplex  Follow-up with ankle-brachial index    Orders:    VAS carotid complete study; Future    Ambulatory Referral to Cardiology; Future    VAS KEVYN and waveform analysis, multiple levels; Future

## 2024-12-14 LAB — HBA1C MFR BLD: 5.5 % OF TOTAL HGB

## 2024-12-15 ENCOUNTER — RESULTS FOLLOW-UP (OUTPATIENT)
Dept: INTERNAL MEDICINE CLINIC | Facility: CLINIC | Age: 74
End: 2024-12-15

## 2024-12-17 ENCOUNTER — TELEPHONE (OUTPATIENT)
Dept: INTERNAL MEDICINE CLINIC | Facility: CLINIC | Age: 74
End: 2024-12-17

## 2024-12-17 NOTE — TELEPHONE ENCOUNTER
Messaged Pt on Mychart and advised him of Dr. Sands message below:      Okay to get regular aspirin 81 mg once daily over-the-counter

## 2025-01-14 ENCOUNTER — HOSPITAL ENCOUNTER (OUTPATIENT)
Dept: NON INVASIVE DIAGNOSTICS | Facility: CLINIC | Age: 75
Discharge: HOME | End: 2025-01-14
Payer: COMMERCIAL

## 2025-01-14 ENCOUNTER — OFFICE VISIT (OUTPATIENT)
Dept: CARDIOLOGY CLINIC | Facility: CLINIC | Age: 75
End: 2025-01-14
Payer: COMMERCIAL

## 2025-01-14 VITALS
WEIGHT: 168 LBS | BODY MASS INDEX: 25.54 KG/M2 | OXYGEN SATURATION: 97 % | HEART RATE: 65 BPM | SYSTOLIC BLOOD PRESSURE: 180 MMHG | DIASTOLIC BLOOD PRESSURE: 70 MMHG

## 2025-01-14 DIAGNOSIS — R06.02 SOB (SHORTNESS OF BREATH): ICD-10-CM

## 2025-01-14 DIAGNOSIS — I25.10 ASCVD (ARTERIOSCLEROTIC CARDIOVASCULAR DISEASE): ICD-10-CM

## 2025-01-14 DIAGNOSIS — R93.1 ELEVATED CORONARY ARTERY CALCIUM SCORE: ICD-10-CM

## 2025-01-14 DIAGNOSIS — I10 PRIMARY HYPERTENSION: ICD-10-CM

## 2025-01-14 DIAGNOSIS — Z82.49 FAMILY HISTORY OF EARLY CAD: ICD-10-CM

## 2025-01-14 DIAGNOSIS — E78.5 HYPERLIPIDEMIA, UNSPECIFIED HYPERLIPIDEMIA TYPE: Primary | ICD-10-CM

## 2025-01-14 PROCEDURE — 93923 UPR/LXTR ART STDY 3+ LVLS: CPT

## 2025-01-14 PROCEDURE — 99204 OFFICE O/P NEW MOD 45 MIN: CPT | Performed by: INTERNAL MEDICINE

## 2025-01-14 PROCEDURE — 93880 EXTRACRANIAL BILAT STUDY: CPT | Performed by: SURGERY

## 2025-01-14 PROCEDURE — 93880 EXTRACRANIAL BILAT STUDY: CPT

## 2025-01-14 PROCEDURE — 93923 UPR/LXTR ART STDY 3+ LVLS: CPT | Performed by: SURGERY

## 2025-01-16 PROBLEM — Z82.49 FAMILY HISTORY OF EARLY CAD: Status: ACTIVE | Noted: 2025-01-16

## 2025-01-16 PROBLEM — R93.1 ELEVATED CORONARY ARTERY CALCIUM SCORE: Status: ACTIVE | Noted: 2025-01-16

## 2025-01-16 PROCEDURE — 93000 ELECTROCARDIOGRAM COMPLETE: CPT | Performed by: INTERNAL MEDICINE

## 2025-01-16 NOTE — PROGRESS NOTES
Consultation - Cardiology   Arnulfo Alexandra 74 y.o. male MRN: [id number]  Unit/Bed#:  Encounter: [id number]  Physician Requesting Consult: No att. providers found  Reason for Consult / Principal Problem: Elevated coronary calcium score    Assessment:  1. Hyperlipidemia, unspecified hyperlipidemia type  POCT ECG      2. SOB (shortness of breath)  Stress test only, exercise      3. Elevated coronary artery calcium score  Ambulatory Referral to Cardiology      4. Family history of early CAD        5. Primary hypertension          Plan:  We discussed his increased cardiac risk based on his HTN, hypercholesterolemia, FH, and elevated calcium score. He quit smoking in 1984.  He is very active and has no cardiac symptoms. ECG is normal.    I will order a regular EST as a baseline. I would be very surprised if it were abnormal.  I agree with all of the changes in his medications by his PCP. LDL goal is at least below 70.    If his stress test is normal, I will see him back in the office as needed.    History of Present Illness     HPI: Arnulfo Alexandra is a 74 y.o. year old male who denies any past cardiac history. He is very active and denies CP, SOB, palpitations, LE edema.  He does 4 - 6 miles on the treadmill regularly.    He checks his BP regularly at home and it is well controlled on medications.   A1C 5.5  LDL 90  Creatinine 1.11    CT coronary calcium score 12/4/2024 - Total 924.  Left main 233, , L Cx 227    ECG - NSR, normal          Review of Systems:    Alert awake oriented, comfortable, denies any complaints  No fevers chills nausea vomiting  No weakness, dizziness, seizures  no cough, shortness of breath, or wheezing  Denies any palpitations, chest pain, diaphoresis  Denies leg edema, pain or paresthesias  Denies any skin rashes  Denies abdominal pain, bloody stools, masses  Denies any depression or suicidal ideations      Historical Information   Past Medical History:   Diagnosis Date    Arthritis Unknown     Colon polyp     Ear problems     GERD (gastroesophageal reflux disease) 1995    Hyperlipidemia     Hypertension     Shingles 1967    Sinusitis     Tonsillitis      Past Surgical History:   Procedure Laterality Date    BACK SURGERY      HAND SURGERY      Incision Tendon Sheath Of a Finger    OR COLONOSCOPY FLX DX W/COLLJ SPEC WHEN PFRMD N/A 01/15/2019    Procedure: COLONOSCOPY;  Surgeon: Lucas Ngo MD;  Location: [facility];  Service: Gastroenterology    SINUS SURGERY      SPINE SURGERY  ,     TONSILLECTOMY AND ADENOIDECTOMY       Social History     Substance and Sexual Activity   Alcohol Use Yes    Alcohol/week: 4.0 standard drinks of alcohol    Types: 2 Cans of beer, 2 Shots of liquor per week    Comment: social      Social History     Substance and Sexual Activity   Drug Use No     Social History     Tobacco Use   Smoking Status Former    Current packs/day: 0.00    Average packs/day: 0.5 packs/day for 10.0 years (5.0 ttl pk-yrs)    Types: Cigarettes    Start date: 1974    Quit date: 1984    Years since quittin.0   Smokeless Tobacco Never     Family History: Family history non-contributory    Meds/Allergies   all current active meds have been reviewed  No Known Allergies    Objective   Vitals: Blood pressure (!) 180/70, pulse 65, weight 76.2 kg (168 lb), SpO2 97%., Body mass index is 25.54 kg/m².,   Weight (last 2 days)       Date/Time Weight    25 1340 76.2 (168)                      Physical Exam:  GEN: Arnulfo Aleaxndra appears well, alert and oriented x 3, pleasant and cooperative   HEENT: pupils equal, round, and reactive to light; extraocular muscles intact  NECK: supple, no carotid bruits   HEART: regular rhythm, normal S1 and S2, no murmurs, clicks, gallops or rubs   LUNGS: clear to auscultation bilaterally; no wheezes, rales, or rhonchi   ABDOMEN: normal bowel sounds, soft, no tenderness, no distention  EXTREMITIES: peripheral pulses normal; no clubbing, cyanosis, or  edema  NEURO: no focal findings   SKIN: normal without suspicious lesions on exposed skin    Lab Results:   No visits with results within 1 Day(s) from this visit.   Latest known visit with results is:   Orders Only on 12/13/2024   Component Date Value Ref Range Status    Hemoglobin A1C 12/13/2024 5.5  <5.7 % of total Hgb Final    Comment: For the purpose of screening for the presence of  diabetes:     <5.7%       Consistent with the absence of diabetes  5.7-6.4%    Consistent with increased risk for diabetes              (prediabetes)  > or =6.5%  Consistent with diabetes     This assay result is consistent with a decreased risk  of diabetes.     Currently, no consensus exists regarding use of  hemoglobin A1c for diagnosis of diabetes in children.     According to American Diabetes Association (ADA)  guidelines, hemoglobin A1c <7.0% represents optimal  control in non-pregnant diabetic patients. Different  metrics may apply to specific patient populations.   Standards of Medical Care in Diabetes(ADA).            This test was performed on the Roche ronn c503 platform.  Effective 4/16/24, a change in test platforms from the  [employer]  to the Roche ronn c503 may have shifted  HbA1c results compared to historical results.  Based on laboratory validation testing conducted at  [facility], the Roche platform relative to [facility]                           e [employer]  platform had an average increase in HbA1c value of  < or = 0.3%. This difference is within accepted   variability established by the National Glycohemoglobin  Standardization Program. Note that not all individuals  will have had a shift in their results and direct  comparisons between historical and current results for  testing conducted on different platforms is not  recommended.                       Imaging: Results Review Statement: No pertinent imaging studies reviewed.

## 2025-02-04 ENCOUNTER — OFFICE VISIT (OUTPATIENT)
Dept: UROLOGY | Facility: AMBULATORY SURGERY CENTER | Age: 75
End: 2025-02-04
Payer: COMMERCIAL

## 2025-02-04 VITALS
HEIGHT: 68 IN | DIASTOLIC BLOOD PRESSURE: 88 MMHG | SYSTOLIC BLOOD PRESSURE: 158 MMHG | OXYGEN SATURATION: 94 % | BODY MASS INDEX: 25.13 KG/M2 | WEIGHT: 165.8 LBS | HEART RATE: 58 BPM

## 2025-02-04 DIAGNOSIS — R97.20 INCREASED PROSTATE SPECIFIC ANTIGEN (PSA) VELOCITY: ICD-10-CM

## 2025-02-04 PROCEDURE — 99203 OFFICE O/P NEW LOW 30 MIN: CPT

## 2025-02-04 NOTE — PROGRESS NOTES
Name: Arnulfo Alexandra      : 1950      MRN: [id number]  Encounter Provider: LYDIA Grayson  Encounter Date: 2025   Encounter department: [facility] [place]  :  Assessment & Plan  Increased prostate specific antigen (PSA) velocity  I provided the patient with reassurance that he does not have any significant increased PSA velocity.  His PSA has increased 0.3 over the last year which is overall not concerning.  We did have a discussion that the PSA can fluctuate due to several reasons including BPH, sexual intercourse, and systemic infections/illnesses.  I do not recommend any additional workup with his PSA being 1.58.  We did discuss that workup would entail MRI or prostate biopsies which I am not recommending.  We did discuss per the AUA guidelines that he can discontinue prostate cancer screening but he wishes to continue with yearly blood work through his PCP.  We can follow on an as-needed basis and he can certainly call our office with any issues or concerns in the future.    Lab Results   Component Value Date    PSA 1.58 2024    PSA 1.21 2023    PSA 1.22 11/10/2022      Orders:    Ambulatory Referral to Urology        History of Present Illness   Arnulfo Alexandra is a 74 y.o. male who presents today to the office for further evaluation of increased PSA velocity.  He was referred by his PCP due to having an increased PSA of 1.58 from his baseline of 1.2.  He states he has never had a significantly elevated PSA in the past.  He denies any family history of prostate cancer.  He denies any urinary/urological complaints.    AUA SYMPTOM SCORE      Flowsheet Row Most Recent Value   AUA SYMPTOM SCORE    How often have you had a sensation of not emptying your bladder completely after you finished urinating? 0 (P)     How often have you had to urinate again less than two hours after you finished urinating? 1 (P)     How often have you found you stopped and started again  "several times when you urinate? 0 (P)     How often have you found it difficult to postpone urination? 0 (P)     How often have you had a weak urinary stream? 1 (P)     How often have you had to push or strain to begin urination? 0 (P)     How many times did you most typically get up to urinate from the time you went to bed at night until the time you got up in the morning? 1 (P)     Quality of Life: If you were to spend the rest of your life with your urinary condition just the way it is now, how would you feel about that? 1 (P)     AUA SYMPTOM SCORE 3 (P)            Review of Systems   Constitutional:  Negative for chills and fever.   Respiratory: Negative.  Negative for cough and shortness of breath.    Cardiovascular: Negative.  Negative for chest pain.   Gastrointestinal: Negative.  Negative for abdominal distention, abdominal pain, nausea and vomiting.   Genitourinary:  Negative for decreased urine volume, difficulty urinating, dysuria, flank pain, frequency, hematuria, penile discharge, penile pain, penile swelling, scrotal swelling, testicular pain and urgency.   Skin: Negative.  Negative for rash.   Neurological: Negative.    Hematological:  Negative for adenopathy. Does not bruise/bleed easily.          Objective   /88 (Patient Position: Sitting, Cuff Size: Adult)   Pulse 58   Ht 5' 8\" (1.727 m)   Wt 75.2 kg (165 lb 12.8 oz)   SpO2 94%   BMI 25.21 kg/m²     Physical Exam  Vitals reviewed.   Constitutional:       Appearance: Normal appearance.   HENT:      Head: Normocephalic and atraumatic.   Eyes:      Pupils: Pupils are equal, round, and reactive to light.   Cardiovascular:      Rate and Rhythm: Normal rate.   Pulmonary:      Effort: Pulmonary effort is normal.   Musculoskeletal:      Cervical back: Normal range of motion.   Skin:     General: Skin is warm and dry.   Neurological:      General: No focal deficit present.      Mental Status: He is alert and oriented to person, place, and time. "   Psychiatric:         Mood and Affect: Mood normal.         Behavior: Behavior normal.         Thought Content: Thought content normal.         Judgment: Judgment normal.          Results  Lab Results   Component Value Date    PSA 1.58 11/26/2024    PSA 1.21 11/18/2023    PSA 1.22 11/10/2022     Lab Results   Component Value Date    CALCIUM 9.4 11/26/2024     09/21/2017    K 4.1 11/26/2024    CO2 30 11/26/2024     11/26/2024    BUN 23 11/26/2024    CREATININE 1.11 11/26/2024     Lab Results   Component Value Date    WBC 5.9 11/10/2022    HGB 14.8 11/10/2022    HCT 44.1 11/10/2022    MCV 95.7 11/10/2022     11/10/2022       Office Urine Dip  No results found for this or any previous visit (from the past hour).]

## 2025-02-05 ENCOUNTER — HOSPITAL ENCOUNTER (OUTPATIENT)
Dept: MRI IMAGING | Facility: HOSPITAL | Age: 75
Discharge: HOME | End: 2025-02-05

## 2025-02-05 DIAGNOSIS — K76.9 HEPATIC LESION: ICD-10-CM

## 2025-02-11 ENCOUNTER — TELEPHONE (OUTPATIENT)
Dept: OTHER | Facility: HOSPITAL | Age: 75
End: 2025-02-11

## 2025-02-11 NOTE — TELEPHONE ENCOUNTER
Attempted to call the patient however no one picked up.  Left a voicemail.  Wanted to discuss carotid ultrasound and lower extremity arterial perfusion results with the patient.

## 2025-03-06 ENCOUNTER — HOSPITAL ENCOUNTER (OUTPATIENT)
Dept: NON INVASIVE DIAGNOSTICS | Facility: HOSPITAL | Age: 75
Discharge: HOME | End: 2025-03-06
Attending: INTERNAL MEDICINE
Payer: COMMERCIAL

## 2025-03-06 DIAGNOSIS — R06.02 SOB (SHORTNESS OF BREATH): ICD-10-CM

## 2025-03-06 LAB
CHEST PAIN STATEMENT: NORMAL
MAX DIASTOLIC BP: 62 MMHG
MAX HR PERCENT: 100 %
MAX HR: 144 BPM
MAX PREDICTED HEART RATE: 143 BPM
PROTOCOL NAME: NORMAL
RATE PRESSURE PRODUCT: NORMAL
REASON FOR TERMINATION: NORMAL
SL CV STRESS RECOVERY BP: NORMAL MMHG
SL CV STRESS RECOVERY HR: 81 BPM
SL CV STRESS RECOVERY O2 SAT: 98 %
SL CV STRESS STAGE REACHED: 4
STRESS ANGINA INDEX: 0
STRESS BASELINE BP: NORMAL MMHG
STRESS BASELINE HR: 61 BPM
STRESS DUKE TREADMILL SCORE: 3
STRESS O2 SAT REST: 99 %
STRESS PEAK HR: 144 BPM
STRESS POST ESTIMATED WORKLOAD: 12.9 METS
STRESS POST EXERCISE DUR MIN: 10 MIN
STRESS POST EXERCISE DUR MIN: 10 MIN
STRESS POST EXERCISE DUR SEC: 45 SEC
STRESS POST EXERCISE DUR SEC: 45 SEC
STRESS POST O2 SAT PEAK: 98 %
STRESS POST PEAK BP: 176 MMHG
STRESS POST PEAK HR: 144 BPM
STRESS POST PEAK SYSTOLIC BP: 192 MMHG
STRESS ST DEPRESSION: 1.5 MM
TARGET HR FORMULA: NORMAL
TEST INDICATION: NORMAL

## 2025-03-06 PROCEDURE — 93017 CV STRESS TEST TRACING ONLY: CPT

## 2025-03-06 PROCEDURE — 93018 CV STRESS TEST I&R ONLY: CPT | Performed by: INTERNAL MEDICINE

## 2025-03-06 PROCEDURE — 93016 CV STRESS TEST SUPVJ ONLY: CPT | Performed by: INTERNAL MEDICINE

## 2025-03-22 DIAGNOSIS — I10 ESSENTIAL HYPERTENSION: ICD-10-CM

## 2025-03-23 RX ORDER — AMLODIPINE BESYLATE 5 MG/1
5 TABLET ORAL DAILY
Qty: 90 TABLET | Refills: 1 | Status: SHIPPED | OUTPATIENT
Start: 2025-03-23

## 2025-04-08 DIAGNOSIS — I10 ESSENTIAL HYPERTENSION: ICD-10-CM

## 2025-04-08 DIAGNOSIS — K21.9 GASTROESOPHAGEAL REFLUX DISEASE WITHOUT ESOPHAGITIS: ICD-10-CM

## 2025-04-09 RX ORDER — METOPROLOL SUCCINATE 50 MG/1
50 TABLET, EXTENDED RELEASE ORAL DAILY
Qty: 90 TABLET | Refills: 1 | Status: SHIPPED | OUTPATIENT
Start: 2025-04-09

## 2025-04-09 RX ORDER — FAMOTIDINE 20 MG/1
20 TABLET, FILM COATED ORAL DAILY
Qty: 90 TABLET | Refills: 1 | Status: SHIPPED | OUTPATIENT
Start: 2025-04-09

## 2025-04-24 ENCOUNTER — PATIENT MESSAGE (OUTPATIENT)
Dept: INTERNAL MEDICINE CLINIC | Facility: CLINIC | Age: 75
End: 2025-04-24

## 2025-04-24 DIAGNOSIS — E78.49 OTHER HYPERLIPIDEMIA: Primary | ICD-10-CM

## 2025-04-30 ENCOUNTER — RESULTS FOLLOW-UP (OUTPATIENT)
Dept: INTERNAL MEDICINE CLINIC | Facility: CLINIC | Age: 75
End: 2025-04-30

## 2025-04-30 LAB
ALBUMIN SERPL-MCNC: 4.5 G/DL (ref 3.6–5.1)
ALBUMIN/GLOB SERPL: 1.9 (CALC) (ref 1–2.5)
ALP SERPL-CCNC: 83 U/L (ref 35–144)
ALT SERPL-CCNC: 22 U/L (ref 9–46)
AST SERPL-CCNC: 27 U/L (ref 10–35)
BILIRUB DIRECT SERPL-MCNC: 0.1 MG/DL
BILIRUB INDIRECT SERPL-MCNC: 0.3 MG/DL (CALC) (ref 0.2–1.2)
BILIRUB SERPL-MCNC: 0.4 MG/DL (ref 0.2–1.2)
GLOBULIN SER CALC-MCNC: 2.4 G/DL (CALC) (ref 1.9–3.7)
PROT SERPL-MCNC: 6.9 G/DL (ref 6.1–8.1)

## 2025-08-27 ENCOUNTER — ESTABLISHED COMPREHENSIVE EXAM (OUTPATIENT)
Dept: URBAN - METROPOLITAN AREA CLINIC 6 | Facility: CLINIC | Age: 75
End: 2025-08-27

## 2025-08-27 DIAGNOSIS — D31.32: ICD-10-CM

## 2025-08-27 DIAGNOSIS — H43.812: ICD-10-CM

## 2025-08-27 DIAGNOSIS — H25.813: ICD-10-CM

## 2025-08-27 PROCEDURE — 92014 COMPRE OPH EXAM EST PT 1/>: CPT

## 2025-08-27 PROCEDURE — 92250 FUNDUS PHOTOGRAPHY W/I&R: CPT

## 2025-08-27 ASSESSMENT — TONOMETRY
OD_IOP_MMHG: 17
OS_IOP_MMHG: 17

## 2025-08-27 ASSESSMENT — VISUAL ACUITY
OD_CC: 20/25
OU_CC: 20/25
OS_CC: 20/30

## (undated) DEVICE — ENDOCUFF VISION LRG GREEN ID 11.2